# Patient Record
Sex: MALE | Race: WHITE | Employment: OTHER | URBAN - METROPOLITAN AREA
[De-identification: names, ages, dates, MRNs, and addresses within clinical notes are randomized per-mention and may not be internally consistent; named-entity substitution may affect disease eponyms.]

---

## 2017-01-11 DIAGNOSIS — F41.9 ANXIETY: ICD-10-CM

## 2017-01-11 RX ORDER — ALPRAZOLAM 1 MG/1
1 TABLET ORAL 3 TIMES DAILY PRN
Qty: 30 TABLET | Refills: 0 | OUTPATIENT
Start: 2017-01-11

## 2017-02-27 RX ORDER — ATORVASTATIN CALCIUM 10 MG/1
TABLET, FILM COATED ORAL
Qty: 30 TABLET | Refills: 2 | Status: SHIPPED | OUTPATIENT
Start: 2017-02-27 | End: 2018-01-03 | Stop reason: SDUPTHER

## 2017-03-20 ENCOUNTER — TELEPHONE (OUTPATIENT)
Dept: FAMILY MEDICINE CLINIC | Age: 57
End: 2017-03-20

## 2017-03-20 RX ORDER — AMOXICILLIN 500 MG/1
500 CAPSULE ORAL 3 TIMES DAILY
Qty: 30 CAPSULE | Refills: 0 | Status: CANCELLED | OUTPATIENT
Start: 2017-03-20 | End: 2017-03-30

## 2017-03-20 RX ORDER — AMOXICILLIN 500 MG/1
500 CAPSULE ORAL 3 TIMES DAILY
Qty: 30 CAPSULE | Refills: 0 | Status: SHIPPED | OUTPATIENT
Start: 2017-03-20 | End: 2017-03-30

## 2018-01-03 ENCOUNTER — OFFICE VISIT (OUTPATIENT)
Dept: FAMILY MEDICINE CLINIC | Age: 58
End: 2018-01-03

## 2018-01-03 VITALS
RESPIRATION RATE: 16 BRPM | WEIGHT: 212.2 LBS | TEMPERATURE: 97.3 F | SYSTOLIC BLOOD PRESSURE: 142 MMHG | HEART RATE: 59 BPM | OXYGEN SATURATION: 98 % | BODY MASS INDEX: 30.38 KG/M2 | DIASTOLIC BLOOD PRESSURE: 85 MMHG | HEIGHT: 70 IN

## 2018-01-03 DIAGNOSIS — R68.89 FLU-LIKE SYMPTOMS: ICD-10-CM

## 2018-01-03 DIAGNOSIS — R05.9 COUGH: Primary | ICD-10-CM

## 2018-01-03 DIAGNOSIS — E78.5 HYPERLIPIDEMIA, UNSPECIFIED HYPERLIPIDEMIA TYPE: ICD-10-CM

## 2018-01-03 LAB
INFLUENZA A ANTIBODY: NORMAL
INFLUENZA B ANTIBODY: NORMAL

## 2018-01-03 PROCEDURE — G8427 DOCREV CUR MEDS BY ELIG CLIN: HCPCS | Performed by: NURSE PRACTITIONER

## 2018-01-03 PROCEDURE — 3017F COLORECTAL CA SCREEN DOC REV: CPT | Performed by: NURSE PRACTITIONER

## 2018-01-03 PROCEDURE — 87804 INFLUENZA ASSAY W/OPTIC: CPT | Performed by: NURSE PRACTITIONER

## 2018-01-03 PROCEDURE — G8484 FLU IMMUNIZE NO ADMIN: HCPCS | Performed by: NURSE PRACTITIONER

## 2018-01-03 PROCEDURE — 1036F TOBACCO NON-USER: CPT | Performed by: NURSE PRACTITIONER

## 2018-01-03 PROCEDURE — 99214 OFFICE O/P EST MOD 30 MIN: CPT | Performed by: NURSE PRACTITIONER

## 2018-01-03 PROCEDURE — G8417 CALC BMI ABV UP PARAM F/U: HCPCS | Performed by: NURSE PRACTITIONER

## 2018-01-03 RX ORDER — ATORVASTATIN CALCIUM 10 MG/1
10 TABLET, FILM COATED ORAL DAILY
Qty: 30 TABLET | Refills: 2 | Status: SHIPPED | OUTPATIENT
Start: 2018-01-03 | End: 2018-03-19 | Stop reason: SDUPTHER

## 2018-01-03 RX ORDER — AZITHROMYCIN 250 MG/1
TABLET, FILM COATED ORAL
Qty: 6 TABLET | Refills: 0 | Status: SHIPPED | OUTPATIENT
Start: 2018-01-03 | End: 2018-01-13

## 2018-01-03 ASSESSMENT — ENCOUNTER SYMPTOMS
SHORTNESS OF BREATH: 0
COUGH: 1
WHEEZING: 1
CHEST TIGHTNESS: 0
SINUS PRESSURE: 1
SINUS PAIN: 0
RHINORRHEA: 1
SORE THROAT: 1

## 2018-01-03 ASSESSMENT — PATIENT HEALTH QUESTIONNAIRE - PHQ9
1. LITTLE INTEREST OR PLEASURE IN DOING THINGS: 0
SUM OF ALL RESPONSES TO PHQ QUESTIONS 1-9: 0
SUM OF ALL RESPONSES TO PHQ9 QUESTIONS 1 & 2: 0
2. FEELING DOWN, DEPRESSED OR HOPELESS: 0

## 2018-01-03 NOTE — PROGRESS NOTES
normal.   Nose: Nose normal.   Mouth/Throat: Uvula is midline and mucous membranes are normal. Posterior oropharyngeal erythema present. Cardiovascular: Normal rate, regular rhythm and normal heart sounds. Pulmonary/Chest: Effort normal. He has no decreased breath sounds. He has wheezes in the right upper field and the right lower field. He has no rhonchi. He has rales in the right lower field. Lymphadenopathy:     He has no cervical adenopathy. Neurological: He is alert and oriented to person, place, and time. Skin: Skin is warm and dry. Vitals reviewed. BP (!) 142/85   Pulse 59   Temp 97.3 °F (36.3 °C) (Tympanic)   Resp 16   Ht 5' 10\" (1.778 m)   Wt 212 lb 3.2 oz (96.3 kg)   SpO2 98%   BMI 30.45 kg/m²     Assessment:      1. Cough  azithromycin (ZITHROMAX Z-ANNE) 250 MG tablet   2. Flu-like symptoms     3. Hyperlipidemia, unspecified hyperlipidemia type             Plan:      Rapid flu negative  Pt refusing CXR. Based on exam findings will empirically cover with Zpak for pna  Resume lipitor. Recommend weight loss, regular exercise and healthy diet  Schedule for CPE with Dr Darrel Ramos with routine labs  See pt instructions  F/u 5-7 days if no improvement, sooner if symptoms worsen. Discussed use, benefit, and side effects of prescribed medications. All patient questions answered. Pt voiced understanding.

## 2018-01-03 NOTE — PATIENT INSTRUCTIONS
cookies. · Bake, broil, or steam foods. Don't guidry them. · Be physically active. Get at least 30 minutes of exercise on most days of the week. Walking is a good choice. You also may want to do other activities, such as running, swimming, cycling, or playing tennis or team sports. · Stay at a healthy weight or lose weight by making the changes in eating and physical activity listed above. Losing just a small amount of weight, even 5 to 10 pounds, can reduce your risk for having a heart attack or stroke. · Do not smoke. When should you call for help? Watch closely for changes in your health, and be sure to contact your doctor if:  ? · You need help making lifestyle changes. ? · You have questions about your medicine. Where can you learn more? Go to [email or web address]://Vod[employer] Labspepiceweb.[email or web address] Systems. org and sign in to your TSO3 account. Enter W703 in the AppFog box to learn more about \"High Cholesterol: Care Instructions. \"     If you do not have an account, please click on the \"Sign Up Now\" link. Current as of: September 21, 2016  Content Version: 11.4  © 3140-9979 Healthwise, Incorporated. Care instructions adapted under license by [facility] ([facility]). If you have questions about a medical condition or this instruction, always ask your healthcare professional. [address] any warranty or liability for your use of this information.

## 2018-01-18 ENCOUNTER — TELEPHONE (OUTPATIENT)
Dept: FAMILY MEDICINE CLINIC | Age: 58
End: 2018-01-18

## 2018-01-18 NOTE — TELEPHONE ENCOUNTER
Patient saw Vega Bolden on 1/3/18 for head congestion. He took the medication that she prescribed but now the symptoms have turned into nasal drainage, fluid in ears, and chest congestion. He is on disability at this time and can't afford another office visit. He is asking if Dr. Puneet Chaidez can prescribe something for him. Sharri1 FLAKO Gamez confirmed.

## 2018-01-29 ENCOUNTER — TELEPHONE (OUTPATIENT)
Dept: FAMILY MEDICINE CLINIC | Age: 58
End: 2018-01-29

## 2018-01-29 RX ORDER — DOXYCYCLINE HYCLATE 100 MG
100 TABLET ORAL 2 TIMES DAILY
Qty: 20 TABLET | Refills: 0 | Status: SHIPPED | OUTPATIENT
Start: 2018-01-29 | End: 2018-02-08

## 2018-01-29 RX ORDER — AMOXICILLIN 500 MG/1
500 CAPSULE ORAL 3 TIMES DAILY
Qty: 30 CAPSULE | Refills: 0 | Status: SHIPPED | OUTPATIENT
Start: 2018-01-29 | End: 2018-02-08

## 2018-01-29 NOTE — TELEPHONE ENCOUNTER
Pt normally sees Jefferson Funk saw Abelardo Montanooring 01/03/2018 and was prescribed a z pack. Symptoms improved but not pt has swollen glands in neck and fluid in ears. Patient was offered an appointment but refused. Would like Dr. Jefferson Funk to prescribe something for his sypmtoms. Please call and advise.

## 2018-02-21 ENCOUNTER — OFFICE VISIT (OUTPATIENT)
Dept: FAMILY MEDICINE CLINIC | Age: 58
End: 2018-02-21

## 2018-02-21 VITALS
OXYGEN SATURATION: 97 % | BODY MASS INDEX: 32 KG/M2 | SYSTOLIC BLOOD PRESSURE: 128 MMHG | HEART RATE: 74 BPM | DIASTOLIC BLOOD PRESSURE: 78 MMHG | WEIGHT: 223 LBS | TEMPERATURE: 98 F | RESPIRATION RATE: 16 BRPM

## 2018-02-21 DIAGNOSIS — J01.90 ACUTE BACTERIAL SINUSITIS: Primary | ICD-10-CM

## 2018-02-21 DIAGNOSIS — R21 RASH OF FACE: ICD-10-CM

## 2018-02-21 DIAGNOSIS — B96.89 ACUTE BACTERIAL SINUSITIS: Primary | ICD-10-CM

## 2018-02-21 DIAGNOSIS — E78.5 HYPERLIPIDEMIA, UNSPECIFIED HYPERLIPIDEMIA TYPE: ICD-10-CM

## 2018-02-21 PROCEDURE — 1036F TOBACCO NON-USER: CPT | Performed by: FAMILY MEDICINE

## 2018-02-21 PROCEDURE — G8417 CALC BMI ABV UP PARAM F/U: HCPCS | Performed by: FAMILY MEDICINE

## 2018-02-21 PROCEDURE — 3017F COLORECTAL CA SCREEN DOC REV: CPT | Performed by: FAMILY MEDICINE

## 2018-02-21 PROCEDURE — 99214 OFFICE O/P EST MOD 30 MIN: CPT | Performed by: FAMILY MEDICINE

## 2018-02-21 PROCEDURE — G8484 FLU IMMUNIZE NO ADMIN: HCPCS | Performed by: FAMILY MEDICINE

## 2018-02-21 PROCEDURE — G8427 DOCREV CUR MEDS BY ELIG CLIN: HCPCS | Performed by: FAMILY MEDICINE

## 2018-02-21 RX ORDER — AMOXICILLIN AND CLAVULANATE POTASSIUM 875; 125 MG/1; MG/1
1 TABLET, FILM COATED ORAL 2 TIMES DAILY
Qty: 20 TABLET | Refills: 0 | Status: SHIPPED | OUTPATIENT
Start: 2018-02-21 | End: 2018-03-03

## 2018-03-19 ENCOUNTER — OFFICE VISIT (OUTPATIENT)
Dept: FAMILY MEDICINE CLINIC | Age: 58
End: 2018-03-19

## 2018-03-19 VITALS
OXYGEN SATURATION: 96 % | DIASTOLIC BLOOD PRESSURE: 84 MMHG | SYSTOLIC BLOOD PRESSURE: 130 MMHG | HEART RATE: 90 BPM | RESPIRATION RATE: 16 BRPM | BODY MASS INDEX: 31.71 KG/M2 | WEIGHT: 221 LBS

## 2018-03-19 DIAGNOSIS — F40.243 FEAR OF FLYING: ICD-10-CM

## 2018-03-19 DIAGNOSIS — E78.5 HYPERLIPIDEMIA, UNSPECIFIED HYPERLIPIDEMIA TYPE: ICD-10-CM

## 2018-03-19 DIAGNOSIS — J01.90 ACUTE BACTERIAL SINUSITIS: Primary | ICD-10-CM

## 2018-03-19 DIAGNOSIS — L71.9 ROSACEA: ICD-10-CM

## 2018-03-19 DIAGNOSIS — R06.2 WHEEZING: ICD-10-CM

## 2018-03-19 DIAGNOSIS — B96.89 ACUTE BACTERIAL SINUSITIS: Primary | ICD-10-CM

## 2018-03-19 PROCEDURE — G8417 CALC BMI ABV UP PARAM F/U: HCPCS | Performed by: FAMILY MEDICINE

## 2018-03-19 PROCEDURE — G8427 DOCREV CUR MEDS BY ELIG CLIN: HCPCS | Performed by: FAMILY MEDICINE

## 2018-03-19 PROCEDURE — 1036F TOBACCO NON-USER: CPT | Performed by: FAMILY MEDICINE

## 2018-03-19 PROCEDURE — 99214 OFFICE O/P EST MOD 30 MIN: CPT | Performed by: FAMILY MEDICINE

## 2018-03-19 PROCEDURE — G8484 FLU IMMUNIZE NO ADMIN: HCPCS | Performed by: FAMILY MEDICINE

## 2018-03-19 PROCEDURE — 3017F COLORECTAL CA SCREEN DOC REV: CPT | Performed by: FAMILY MEDICINE

## 2018-03-19 RX ORDER — CIPROFLOXACIN 500 MG/1
500 TABLET, FILM COATED ORAL 2 TIMES DAILY
Qty: 20 TABLET | Refills: 0 | Status: SHIPPED | OUTPATIENT
Start: 2018-03-19 | End: 2018-03-29

## 2018-03-19 RX ORDER — METRONIDAZOLE 7.5 MG/G
GEL TOPICAL
Qty: 45 G | Refills: 1 | Status: SHIPPED | OUTPATIENT
Start: 2018-03-19 | End: 2018-04-02

## 2018-03-19 RX ORDER — ALPRAZOLAM 1 MG/1
1 TABLET ORAL NIGHTLY PRN
Qty: 4 TABLET | Refills: 0 | Status: SHIPPED | OUTPATIENT
Start: 2018-04-14 | End: 2018-04-02

## 2018-03-19 RX ORDER — ATORVASTATIN CALCIUM 10 MG/1
10 TABLET, FILM COATED ORAL DAILY
Qty: 30 TABLET | Refills: 2 | Status: SHIPPED | OUTPATIENT
Start: 2018-03-19 | End: 2018-06-23 | Stop reason: SDUPTHER

## 2018-03-19 RX ORDER — PREDNISONE 20 MG/1
40 TABLET ORAL DAILY
Qty: 10 TABLET | Refills: 0 | Status: SHIPPED | OUTPATIENT
Start: 2018-03-19 | End: 2018-03-24

## 2018-03-19 NOTE — PROGRESS NOTES
Subjective:       Jil Voss is a 56 y.o. male who presents for evaluation of sinus pain. Symptoms include: congestion, facial pain, sinus pressure and bilateral ear pressure. Onset of symptoms was 9 weeks ago. Symptoms have been gradually worsening since that time. Using doxycycline, Zithromax, augmentin at home for the symptoms. The patient has had sick contacts with similar symptoms. Patient is a former smoker, quit 2 years ago. Still having red facial rash. Has tried vit D oil with no improvement. Will by flying to [facility] soon. Requesting xanax to help him w fear of flying. Patient's medications, allergies, past medical, surgical, social and family histories were reviewed and updated as appropriate. Review of Systems  Constitutional: negative for fevers and weight loss  Eyes: negative for irritation and redness  Ears, nose, mouth, throat, and face: negative except for as noted above  Respiratory: negative for hemoptysis and shortness of breath  Gastrointestinal: negative for diarrhea and vomiting     Objective:      /84 (Site: Right Arm, Position: Sitting, Cuff Size: Large Adult)   Pulse 90   Resp 16   Wt 221 lb (100.2 kg)   SpO2 96%   BMI 31.71 kg/m²   General appearance: alert, appears stated age, cooperative and no distress  Ears: normal TM's and external ear canals both ears  Nose: moderate congestion, turbinates red, swollen  Throat: normal findings: soft palate, uvula, and tonsils normal and oropharynx pink & moist without lesions or evidence of thrush  Neck: no adenopathy  Lungs: diffuse wheezes bilaterally    Derm: erythematous patches on bilateral cheeks. No pustules/papules present. Assessment:     1. Acute bacterial sinusitis    2. Rosacea    3. Fear of flying    4. Wheezing    5. Hyperlipidemia, unspecified hyperlipidemia type          Plan:     cipro per medication orders. prednisone  Supportive/symptomatic care reviewed with the patient.   Plan sinus imaging if this becomes a recurrent problem. Advised the patient to follow up w ENT  if not better in 5 days. Trial metrogel for likely rosacea  Xanax PRN flying.

## 2018-04-02 ENCOUNTER — OFFICE VISIT (OUTPATIENT)
Dept: ENT CLINIC | Age: 58
End: 2018-04-02

## 2018-04-02 VITALS — SYSTOLIC BLOOD PRESSURE: 138 MMHG | HEART RATE: 66 BPM | DIASTOLIC BLOOD PRESSURE: 80 MMHG

## 2018-04-02 DIAGNOSIS — J32.4 CHRONIC PANSINUSITIS: Primary | ICD-10-CM

## 2018-04-02 DIAGNOSIS — H65.92 MIDDLE EAR EFFUSION, LEFT: ICD-10-CM

## 2018-04-02 PROCEDURE — 99203 OFFICE O/P NEW LOW 30 MIN: CPT | Performed by: OTOLARYNGOLOGY

## 2018-04-02 PROCEDURE — 3017F COLORECTAL CA SCREEN DOC REV: CPT | Performed by: OTOLARYNGOLOGY

## 2018-04-02 PROCEDURE — G8427 DOCREV CUR MEDS BY ELIG CLIN: HCPCS | Performed by: OTOLARYNGOLOGY

## 2018-04-02 PROCEDURE — G8417 CALC BMI ABV UP PARAM F/U: HCPCS | Performed by: OTOLARYNGOLOGY

## 2018-04-02 PROCEDURE — 4004F PT TOBACCO SCREEN RCVD TLK: CPT | Performed by: OTOLARYNGOLOGY

## 2018-04-03 ENCOUNTER — TELEPHONE (OUTPATIENT)
Dept: ENT CLINIC | Age: 58
End: 2018-04-03

## 2018-04-06 ENCOUNTER — TELEPHONE (OUTPATIENT)
Dept: ENT CLINIC | Age: 58
End: 2018-04-06

## 2018-04-06 DIAGNOSIS — J32.4 CHRONIC PANSINUSITIS: Primary | ICD-10-CM

## 2018-04-07 ENCOUNTER — HOSPITAL ENCOUNTER (OUTPATIENT)
Dept: CT IMAGING | Age: 58
Discharge: HOME | End: 2018-04-07
Attending: OTOLARYNGOLOGY | Admitting: OTOLARYNGOLOGY

## 2018-04-07 DIAGNOSIS — H65.92 MIDDLE EAR EFFUSION, LEFT: ICD-10-CM

## 2018-04-07 DIAGNOSIS — J32.4 CHRONIC PANSINUSITIS: ICD-10-CM

## 2018-04-09 ENCOUNTER — HOSPITAL ENCOUNTER (OUTPATIENT)
Dept: SURGERY | Age: 58
Discharge: HOME | End: 2018-04-09
Attending: OTOLARYNGOLOGY | Admitting: OTOLARYNGOLOGY

## 2018-04-09 VITALS
HEIGHT: 70 IN | WEIGHT: 223.25 LBS | RESPIRATION RATE: 14 BRPM | TEMPERATURE: 97.8 F | OXYGEN SATURATION: 95 % | BODY MASS INDEX: 31.96 KG/M2 | DIASTOLIC BLOOD PRESSURE: 92 MMHG | HEART RATE: 70 BPM | SYSTOLIC BLOOD PRESSURE: 149 MMHG

## 2018-04-09 DIAGNOSIS — H74.93: ICD-10-CM

## 2018-04-09 PROCEDURE — 69436 CREATE EARDRUM OPENING: CPT | Performed by: OTOLARYNGOLOGY

## 2018-04-09 RX ORDER — LIDOCAINE HYDROCHLORIDE 10 MG/ML
1 INJECTION, SOLUTION EPIDURAL; INFILTRATION; INTRACAUDAL; PERINEURAL
Status: ACTIVE | OUTPATIENT
Start: 2018-04-09 | End: 2018-04-09

## 2018-04-09 RX ORDER — SODIUM CHLORIDE 0.9 % (FLUSH) 0.9 %
10 SYRINGE (ML) INJECTION EVERY 12 HOURS SCHEDULED
Status: DISCONTINUED | OUTPATIENT
Start: 2018-04-09 | End: 2018-04-10 | Stop reason: HOSPADM

## 2018-04-09 RX ORDER — SODIUM CHLORIDE 0.9 % (FLUSH) 0.9 %
10 SYRINGE (ML) INJECTION PRN
Status: DISCONTINUED | OUTPATIENT
Start: 2018-04-09 | End: 2018-04-10 | Stop reason: HOSPADM

## 2018-04-09 RX ORDER — LABETALOL HYDROCHLORIDE 5 MG/ML
5 INJECTION, SOLUTION INTRAVENOUS EVERY 10 MIN PRN
Status: DISCONTINUED | OUTPATIENT
Start: 2018-04-09 | End: 2018-04-10 | Stop reason: HOSPADM

## 2018-04-09 RX ORDER — SODIUM CHLORIDE, SODIUM LACTATE, POTASSIUM CHLORIDE, CALCIUM CHLORIDE 600; 310; 30; 20 MG/100ML; MG/100ML; MG/100ML; MG/100ML
INJECTION, SOLUTION INTRAVENOUS CONTINUOUS
Status: DISCONTINUED | OUTPATIENT
Start: 2018-04-09 | End: 2018-04-10 | Stop reason: HOSPADM

## 2018-04-09 RX ORDER — ONDANSETRON 2 MG/ML
4 INJECTION INTRAMUSCULAR; INTRAVENOUS EVERY 6 HOURS PRN
Status: DISCONTINUED | OUTPATIENT
Start: 2018-04-09 | End: 2018-04-10 | Stop reason: HOSPADM

## 2018-04-09 RX ORDER — ONDANSETRON 2 MG/ML
4 INJECTION INTRAMUSCULAR; INTRAVENOUS
Status: ACTIVE | OUTPATIENT
Start: 2018-04-09 | End: 2018-04-09

## 2018-04-09 RX ORDER — HYDRALAZINE HYDROCHLORIDE 20 MG/ML
5 INJECTION INTRAMUSCULAR; INTRAVENOUS EVERY 10 MIN PRN
Status: DISCONTINUED | OUTPATIENT
Start: 2018-04-09 | End: 2018-04-10 | Stop reason: HOSPADM

## 2018-04-09 RX ADMIN — SODIUM CHLORIDE, SODIUM LACTATE, POTASSIUM CHLORIDE, CALCIUM CHLORIDE: 600; 310; 30; 20 INJECTION, SOLUTION INTRAVENOUS at 12:48

## 2018-04-09 ASSESSMENT — LIFESTYLE VARIABLES: SMOKING_STATUS: 1

## 2018-04-09 ASSESSMENT — PAIN SCALES - GENERAL
PAINLEVEL_OUTOF10: 0
PAINLEVEL_OUTOF10: 0

## 2018-04-09 ASSESSMENT — PAIN - FUNCTIONAL ASSESSMENT: PAIN_FUNCTIONAL_ASSESSMENT: 0-10

## 2018-04-11 ENCOUNTER — TELEPHONE (OUTPATIENT)
Dept: ENT CLINIC | Age: 58
End: 2018-04-11

## 2018-04-13 ENCOUNTER — OFFICE VISIT (OUTPATIENT)
Dept: ENT CLINIC | Age: 58
End: 2018-04-13

## 2018-04-13 VITALS — HEART RATE: 72 BPM | DIASTOLIC BLOOD PRESSURE: 80 MMHG | SYSTOLIC BLOOD PRESSURE: 130 MMHG

## 2018-04-13 DIAGNOSIS — Z96.22 S/P TYMPANOSTOMY TUBE PLACEMENT: Primary | ICD-10-CM

## 2018-04-13 PROCEDURE — 99024 POSTOP FOLLOW-UP VISIT: CPT | Performed by: OTOLARYNGOLOGY

## 2018-04-13 RX ORDER — METHYLPREDNISOLONE 4 MG/1
TABLET ORAL
Qty: 1 KIT | Refills: 0 | Status: SHIPPED | OUTPATIENT
Start: 2018-04-13 | End: 2018-04-19

## 2018-05-16 ENCOUNTER — TELEPHONE (OUTPATIENT)
Dept: FAMILY MEDICINE CLINIC | Age: 58
End: 2018-05-16

## 2018-05-22 ENCOUNTER — OFFICE VISIT (OUTPATIENT)
Dept: FAMILY MEDICINE CLINIC | Age: 58
End: 2018-05-22

## 2018-05-22 VITALS
DIASTOLIC BLOOD PRESSURE: 80 MMHG | BODY MASS INDEX: 30.99 KG/M2 | WEIGHT: 216 LBS | HEART RATE: 88 BPM | OXYGEN SATURATION: 98 % | SYSTOLIC BLOOD PRESSURE: 150 MMHG

## 2018-05-22 DIAGNOSIS — R21 RASH: ICD-10-CM

## 2018-05-22 DIAGNOSIS — M79.604 RIGHT LEG PAIN: Primary | ICD-10-CM

## 2018-05-22 PROCEDURE — G8427 DOCREV CUR MEDS BY ELIG CLIN: HCPCS | Performed by: FAMILY MEDICINE

## 2018-05-22 PROCEDURE — 4004F PT TOBACCO SCREEN RCVD TLK: CPT | Performed by: FAMILY MEDICINE

## 2018-05-22 PROCEDURE — 99214 OFFICE O/P EST MOD 30 MIN: CPT | Performed by: FAMILY MEDICINE

## 2018-05-22 PROCEDURE — 3017F COLORECTAL CA SCREEN DOC REV: CPT | Performed by: FAMILY MEDICINE

## 2018-05-22 PROCEDURE — G8417 CALC BMI ABV UP PARAM F/U: HCPCS | Performed by: FAMILY MEDICINE

## 2018-05-22 RX ORDER — SULFAMETHOXAZOLE AND TRIMETHOPRIM 800; 160 MG/1; MG/1
1 TABLET ORAL 2 TIMES DAILY
Qty: 20 TABLET | Refills: 0 | Status: SHIPPED | OUTPATIENT
Start: 2018-05-22 | End: 2018-06-01

## 2018-06-23 DIAGNOSIS — E78.5 HYPERLIPIDEMIA, UNSPECIFIED HYPERLIPIDEMIA TYPE: ICD-10-CM

## 2018-06-25 RX ORDER — ATORVASTATIN CALCIUM 10 MG/1
TABLET, FILM COATED ORAL
Qty: 90 TABLET | Refills: 1 | Status: SHIPPED | OUTPATIENT
Start: 2018-06-25 | End: 2018-12-29 | Stop reason: SDUPTHER

## 2018-08-08 ENCOUNTER — OFFICE VISIT (OUTPATIENT)
Dept: FAMILY MEDICINE CLINIC | Age: 58
End: 2018-08-08

## 2018-08-08 VITALS
SYSTOLIC BLOOD PRESSURE: 144 MMHG | HEIGHT: 70 IN | WEIGHT: 212.8 LBS | DIASTOLIC BLOOD PRESSURE: 84 MMHG | HEART RATE: 62 BPM | RESPIRATION RATE: 14 BRPM | OXYGEN SATURATION: 95 % | BODY MASS INDEX: 30.46 KG/M2

## 2018-08-08 DIAGNOSIS — Z11.59 NEED FOR HEPATITIS C SCREENING TEST: ICD-10-CM

## 2018-08-08 DIAGNOSIS — Z11.4 ENCOUNTER FOR SCREENING FOR HIV: ICD-10-CM

## 2018-08-08 DIAGNOSIS — R16.0 HEPATOMEGALY: ICD-10-CM

## 2018-08-08 DIAGNOSIS — M79.675 PAIN AND SWELLING OF TOE OF LEFT FOOT: ICD-10-CM

## 2018-08-08 DIAGNOSIS — R10.9 RIGHT FLANK PAIN: Primary | ICD-10-CM

## 2018-08-08 DIAGNOSIS — M79.89 PAIN AND SWELLING OF TOE OF LEFT FOOT: ICD-10-CM

## 2018-08-08 DIAGNOSIS — R10.31 RLQ ABDOMINAL PAIN: ICD-10-CM

## 2018-08-08 PROBLEM — H65.92 MIDDLE EAR EFFUSION, LEFT: Status: RESOLVED | Noted: 2018-04-02 | Resolved: 2018-08-08

## 2018-08-08 LAB
BILIRUBIN, POC: NORMAL
BLOOD URINE, POC: NORMAL
CLARITY, POC: CLEAR
COLOR, POC: YELLOW
GLUCOSE URINE, POC: NORMAL
KETONES, POC: NORMAL
LEUKOCYTE EST, POC: NORMAL
NITRITE, POC: NORMAL
PH, POC: 6
PROTEIN, POC: NORMAL
SPECIFIC GRAVITY, POC: 1.02
UROBILINOGEN, POC: 0.2

## 2018-08-08 PROCEDURE — G8427 DOCREV CUR MEDS BY ELIG CLIN: HCPCS | Performed by: FAMILY MEDICINE

## 2018-08-08 PROCEDURE — 99214 OFFICE O/P EST MOD 30 MIN: CPT | Performed by: FAMILY MEDICINE

## 2018-08-08 PROCEDURE — G8417 CALC BMI ABV UP PARAM F/U: HCPCS | Performed by: FAMILY MEDICINE

## 2018-08-08 PROCEDURE — 3017F COLORECTAL CA SCREEN DOC REV: CPT | Performed by: FAMILY MEDICINE

## 2018-08-08 PROCEDURE — 4004F PT TOBACCO SCREEN RCVD TLK: CPT | Performed by: FAMILY MEDICINE

## 2018-08-08 PROCEDURE — 81002 URINALYSIS NONAUTO W/O SCOPE: CPT | Performed by: FAMILY MEDICINE

## 2018-08-09 DIAGNOSIS — M79.675 PAIN AND SWELLING OF TOE OF LEFT FOOT: ICD-10-CM

## 2018-08-09 DIAGNOSIS — M79.89 PAIN AND SWELLING OF TOE OF LEFT FOOT: ICD-10-CM

## 2018-08-09 DIAGNOSIS — Z11.4 ENCOUNTER FOR SCREENING FOR HIV: ICD-10-CM

## 2018-08-09 DIAGNOSIS — Z11.59 NEED FOR HEPATITIS C SCREENING TEST: ICD-10-CM

## 2018-08-09 DIAGNOSIS — E78.5 HYPERLIPIDEMIA, UNSPECIFIED HYPERLIPIDEMIA TYPE: ICD-10-CM

## 2018-08-09 LAB
A/G RATIO: 1.8 (ref 1.1–2.2)
ALBUMIN SERPL-MCNC: 4.3 G/DL (ref 3.4–5)
ALP BLD-CCNC: 70 U/L (ref 40–129)
ALT SERPL-CCNC: 16 U/L (ref 10–40)
ANION GAP SERPL CALCULATED.3IONS-SCNC: 9 MMOL/L (ref 3–16)
AST SERPL-CCNC: 15 U/L (ref 15–37)
BASOPHILS ABSOLUTE: 0.1 K/UL (ref 0–0.2)
BASOPHILS RELATIVE PERCENT: 0.6 %
BILIRUB SERPL-MCNC: 1.1 MG/DL (ref 0–1)
BUN BLDV-MCNC: 13 MG/DL (ref 7–20)
CALCIUM SERPL-MCNC: 9.3 MG/DL (ref 8.3–10.6)
CHLORIDE BLD-SCNC: 102 MMOL/L (ref 99–110)
CHOLESTEROL, TOTAL: 135 MG/DL (ref 0–199)
CO2: 30 MMOL/L (ref 21–32)
CREAT SERPL-MCNC: 0.7 MG/DL (ref 0.9–1.3)
EOSINOPHILS ABSOLUTE: 0.1 K/UL (ref 0–0.6)
EOSINOPHILS RELATIVE PERCENT: 1.4 %
GFR AFRICAN AMERICAN: >60
GFR NON-AFRICAN AMERICAN: >60
GLOBULIN: 2.4 G/DL
GLUCOSE BLD-MCNC: 89 MG/DL (ref 70–99)
HCT VFR BLD CALC: 46.5 % (ref 40.5–52.5)
HDLC SERPL-MCNC: 45 MG/DL (ref 40–60)
HEMOGLOBIN: 15.2 G/DL (ref 13.5–17.5)
HEPATITIS C ANTIBODY INTERPRETATION: NORMAL
LDL CHOLESTEROL CALCULATED: 71 MG/DL
LYMPHOCYTES ABSOLUTE: 1.8 K/UL (ref 1–5.1)
LYMPHOCYTES RELATIVE PERCENT: 17.9 %
MCH RBC QN AUTO: 30.9 PG (ref 26–34)
MCHC RBC AUTO-ENTMCNC: 32.8 G/DL (ref 31–36)
MCV RBC AUTO: 94.2 FL (ref 80–100)
MONOCYTES ABSOLUTE: 0.6 K/UL (ref 0–1.3)
MONOCYTES RELATIVE PERCENT: 5.6 %
NEUTROPHILS ABSOLUTE: 7.3 K/UL (ref 1.7–7.7)
NEUTROPHILS RELATIVE PERCENT: 74.5 %
PDW BLD-RTO: 13.8 % (ref 12.4–15.4)
PLATELET # BLD: 241 K/UL (ref 135–450)
PMV BLD AUTO: 9.6 FL (ref 5–10.5)
POTASSIUM SERPL-SCNC: 4.5 MMOL/L (ref 3.5–5.1)
PROSTATE SPECIFIC ANTIGEN: 0.85 NG/ML (ref 0–4)
RBC # BLD: 4.93 M/UL (ref 4.2–5.9)
SODIUM BLD-SCNC: 141 MMOL/L (ref 136–145)
TOTAL PROTEIN: 6.7 G/DL (ref 6.4–8.2)
TRIGL SERPL-MCNC: 93 MG/DL (ref 0–150)
TSH REFLEX: 1.29 UIU/ML (ref 0.27–4.2)
URIC ACID, SERUM: 5.7 MG/DL (ref 3.5–7.2)
VLDLC SERPL CALC-MCNC: 19 MG/DL
WBC # BLD: 9.9 K/UL (ref 4–11)

## 2018-08-10 LAB
HIV AG/AB: NORMAL
HIV ANTIGEN: NORMAL
HIV-1 ANTIBODY: NORMAL
HIV-2 AB: NORMAL

## 2018-08-16 ENCOUNTER — TELEPHONE (OUTPATIENT)
Dept: FAMILY MEDICINE CLINIC | Age: 58
End: 2018-08-16

## 2018-08-21 ENCOUNTER — HOSPITAL ENCOUNTER (OUTPATIENT)
Dept: ULTRASOUND IMAGING | Age: 58
Discharge: HOME | End: 2018-08-21
Attending: FAMILY MEDICINE | Admitting: FAMILY MEDICINE

## 2018-08-21 DIAGNOSIS — R16.0 HEPATOMEGALY: ICD-10-CM

## 2018-08-21 DIAGNOSIS — R16.0 HEPATOMEGALY, NOT ELSEWHERE CLASSIFIED: ICD-10-CM

## 2018-10-10 ENCOUNTER — OFFICE VISIT (OUTPATIENT)
Dept: FAMILY MEDICINE CLINIC | Age: 58
End: 2018-10-10
Payer: MEDICARE

## 2018-10-10 VITALS
SYSTOLIC BLOOD PRESSURE: 128 MMHG | DIASTOLIC BLOOD PRESSURE: 80 MMHG | HEIGHT: 70 IN | WEIGHT: 214.2 LBS | RESPIRATION RATE: 15 BRPM | HEART RATE: 77 BPM | OXYGEN SATURATION: 96 % | BODY MASS INDEX: 30.67 KG/M2

## 2018-10-10 DIAGNOSIS — Z00.00 MEDICARE ANNUAL WELLNESS VISIT, INITIAL: Primary | ICD-10-CM

## 2018-10-10 DIAGNOSIS — M54.40 CHRONIC BILATERAL LOW BACK PAIN WITH SCIATICA, SCIATICA LATERALITY UNSPECIFIED: ICD-10-CM

## 2018-10-10 DIAGNOSIS — M54.16 LUMBAR RADICULOPATHY: ICD-10-CM

## 2018-10-10 DIAGNOSIS — G89.29 CHRONIC BILATERAL LOW BACK PAIN WITH SCIATICA, SCIATICA LATERALITY UNSPECIFIED: ICD-10-CM

## 2018-10-10 DIAGNOSIS — F17.200 TOBACCO USE DISORDER: ICD-10-CM

## 2018-10-10 PROCEDURE — 4004F PT TOBACCO SCREEN RCVD TLK: CPT | Performed by: FAMILY MEDICINE

## 2018-10-10 PROCEDURE — 3017F COLORECTAL CA SCREEN DOC REV: CPT | Performed by: FAMILY MEDICINE

## 2018-10-10 PROCEDURE — G8484 FLU IMMUNIZE NO ADMIN: HCPCS | Performed by: FAMILY MEDICINE

## 2018-10-10 PROCEDURE — 99213 OFFICE O/P EST LOW 20 MIN: CPT | Performed by: FAMILY MEDICINE

## 2018-10-10 PROCEDURE — G0438 PPPS, INITIAL VISIT: HCPCS | Performed by: FAMILY MEDICINE

## 2018-10-10 PROCEDURE — G8427 DOCREV CUR MEDS BY ELIG CLIN: HCPCS | Performed by: FAMILY MEDICINE

## 2018-10-10 PROCEDURE — G8417 CALC BMI ABV UP PARAM F/U: HCPCS | Performed by: FAMILY MEDICINE

## 2018-10-10 ASSESSMENT — LIFESTYLE VARIABLES
HAS A RELATIVE, FRIEND, DOCTOR, OR ANOTHER HEALTH PROFESSIONAL EXPRESSED CONCERN ABOUT YOUR DRINKING OR SUGGESTED YOU CUT DOWN: 0
HOW MANY STANDARD DRINKS CONTAINING ALCOHOL DO YOU HAVE ON A TYPICAL DAY: 2
HOW OFTEN DURING THE LAST YEAR HAVE YOU FAILED TO DO WHAT WAS NORMALLY EXPECTED FROM YOU BECAUSE OF DRINKING: 0
AUDIT-C TOTAL SCORE: 5
HOW OFTEN DURING THE LAST YEAR HAVE YOU BEEN UNABLE TO REMEMBER WHAT HAPPENED THE NIGHT BEFORE BECAUSE YOU HAD BEEN DRINKING: 0
HOW OFTEN DURING THE LAST YEAR HAVE YOU FOUND THAT YOU WERE NOT ABLE TO STOP DRINKING ONCE YOU HAD STARTED: 1
HOW OFTEN DO YOU HAVE SIX OR MORE DRINKS ON ONE OCCASION: 1
HAVE YOU OR SOMEONE ELSE BEEN INJURED AS A RESULT OF YOUR DRINKING: 0
HOW OFTEN DURING THE LAST YEAR HAVE YOU HAD A FEELING OF GUILT OR REMORSE AFTER DRINKING: 1
HOW OFTEN DURING THE LAST YEAR HAVE YOU NEEDED AN ALCOHOLIC DRINK FIRST THING IN THE MORNING TO GET YOURSELF GOING AFTER A NIGHT OF HEAVY DRINKING: 0
AUDIT TOTAL SCORE: 7
HOW OFTEN DO YOU HAVE A DRINK CONTAINING ALCOHOL: 2

## 2018-10-10 ASSESSMENT — ANXIETY QUESTIONNAIRES: GAD7 TOTAL SCORE: 5

## 2018-10-10 ASSESSMENT — PATIENT HEALTH QUESTIONNAIRE - PHQ9
SUM OF ALL RESPONSES TO PHQ QUESTIONS 1-9: 2
SUM OF ALL RESPONSES TO PHQ QUESTIONS 1-9: 2

## 2018-10-10 NOTE — PATIENT INSTRUCTIONS
Personalized Preventive Plan for Richy Woodruff - 10/10/2018  Medicare offers a range of preventive health benefits. Some of the tests and screenings are paid in full while other may be subject to a deductible, co-insurance, and/or copay. Some of these benefits include a comprehensive review of your medical history including lifestyle, illnesses that may run in your family, and various assessments and screenings as appropriate. After reviewing your medical record and screening and assessments performed today your provider may have ordered immunizations, labs, imaging, and/or referrals for you. A list of these orders (if applicable) as well as your Preventive Care list are included within your After Visit Summary for your review. Other Preventive Recommendations:    · A preventive eye exam performed by an eye specialist is recommended every 1-2 years to screen for glaucoma; cataracts, macular degeneration, and other eye disorders. · A preventive dental visit is recommended every 6 months. · Try to get at least 150 minutes of exercise per week or 10,000 steps per day on a pedometer . · Order or download the FREE \"Exercise & Physical Activity: Your Everyday Guide\" from [employer]. Call 0-604.679.1041 or search [employer] online. · You need 3222-0057 mg of calcium and 0068-8030 IU of vitamin D per day. It is possible to meet your calcium requirement with diet alone, but a vitamin D supplement is usually necessary to meet this goal.  · When exposed to the sun, use a sunscreen that protects against both UVA and UVB radiation with an SPF of 30 or greater. Reapply every 2 to 3 hours or after sweating, drying off with a towel, or swimming. · Always wear a seat belt when traveling in a car. Always wear a helmet when riding a bicycle or motorcycle.

## 2018-10-10 NOTE — PROGRESS NOTES
Medicare Annual Wellness Visit  Name: Alexandra Perez Date: 10/10/2018   MRN: [id number] Sex: Male   Age: 66 y.o. Ethnicity: Non-/Non    : 1960 Race: Yareli Mills is here for Medicare AWV (pt here for annual wellness visit. last labs done in august. )    Screenings for behavioral, psychosocial and functional/safety risks, and cognitive dysfunction are all negative except as indicated below. These results, as well as other patient data from the [address] form, are documented in Flowsheets linked to this Encounter. Allergies   Allergen Reactions    Nucynta [Tapentadol] Anaphylaxis     Hallucinations. Went to hospital.   [facility] Codeine Rash     itching    Tylenol [Acetaminophen] Rash     itching       Prior to Visit Medications    Medication Sig Taking?  Authorizing Provider   atorvastatin (LIPITOR) 10 MG tablet TAKE 1 TABLET BY MOUTH ONE TIME A DAY  Yes Jay Couch MD       Past Medical History:   Diagnosis Date    Alcoholism ([facility] [address] 1999    Anxiety     Back pain     Hyperlipidemia     Low HDL (under 40)     Prolonged emergence from general anesthesia     \"hard time breathing when waking up'    Spinal arthritis      Past Surgical History:   Procedure Laterality Date    COLONOSCOPY  10/18/12    SPINAL FUSION  10/2006    SPINE SURGERY      l5-s1 hardware removal    TYMPANOSTOMY TUBE PLACEMENT Bilateral 2018    LEFT PRESSURE EQUALIZATION TUBE INSERTION AND EXAM UNDER ANESTHESIA OF NASOPHARYNX       Family History   Problem Relation Age of Onset    Heart Disease Mother     High Blood Pressure Mother     Cancer Mother         female       CareTeam (Including outside providers/suppliers regularly involved in providing care):   Patient Care Team:  Adam Cintron MD as PCP - Johanna Richards MD as Consulting Physician (Otolaryngology)    Wt Readings from Last 3 Encounters:   10/10/18 214 lb 3.2 oz (97.2 kg)   18

## 2018-12-17 ENCOUNTER — TELEPHONE (OUTPATIENT)
Dept: FAMILY MEDICINE CLINIC | Age: 58
End: 2018-12-17

## 2018-12-17 ENCOUNTER — OFFICE VISIT (OUTPATIENT)
Dept: ENT CLINIC | Age: 58
End: 2018-12-17
Payer: MEDICARE

## 2018-12-17 VITALS — DIASTOLIC BLOOD PRESSURE: 80 MMHG | SYSTOLIC BLOOD PRESSURE: 152 MMHG | OXYGEN SATURATION: 94 % | HEART RATE: 73 BPM

## 2018-12-17 DIAGNOSIS — H65.03 BILATERAL ACUTE SEROUS OTITIS MEDIA, RECURRENCE NOT SPECIFIED: Primary | ICD-10-CM

## 2018-12-17 PROCEDURE — 99213 OFFICE O/P EST LOW 20 MIN: CPT | Performed by: OTOLARYNGOLOGY

## 2018-12-17 PROCEDURE — 3017F COLORECTAL CA SCREEN DOC REV: CPT | Performed by: OTOLARYNGOLOGY

## 2018-12-17 PROCEDURE — G8484 FLU IMMUNIZE NO ADMIN: HCPCS | Performed by: OTOLARYNGOLOGY

## 2018-12-17 PROCEDURE — 4004F PT TOBACCO SCREEN RCVD TLK: CPT | Performed by: OTOLARYNGOLOGY

## 2018-12-17 PROCEDURE — G8417 CALC BMI ABV UP PARAM F/U: HCPCS | Performed by: OTOLARYNGOLOGY

## 2018-12-17 PROCEDURE — G8427 DOCREV CUR MEDS BY ELIG CLIN: HCPCS | Performed by: OTOLARYNGOLOGY

## 2018-12-17 RX ORDER — AMOXICILLIN AND CLAVULANATE POTASSIUM 875; 125 MG/1; MG/1
1 TABLET, FILM COATED ORAL 2 TIMES DAILY
Qty: 14 TABLET | Refills: 0 | Status: SHIPPED | OUTPATIENT
Start: 2018-12-17 | End: 2019-02-12 | Stop reason: ALTCHOICE

## 2018-12-17 RX ORDER — METHYLPREDNISOLONE 4 MG/1
TABLET ORAL
Qty: 1 KIT | Refills: 0 | Status: SHIPPED | OUTPATIENT
Start: 2018-12-17 | End: 2018-12-23

## 2018-12-17 NOTE — PROGRESS NOTES
PE tubes placed in April of this year. He was asymptomatic from that time until the last 4 days when he began to notice a fullness, pressure, and sloshing sensation, left greater than right. There is been no otorrhea and he has not detected any tube rejection. He continues to smoke heavily, at least pack of cigarettes per day. He is seriously considering further cessation methods but has not pursued any. There is chronic constant congestion and drainage which she occasionally will treat with over-the-counter medications. Denies  otorrhea, otalgia, vertigo, or tinnitus. No history of ear trauma,  or recent barotrauma. Denies epistaxis, anosmia, or facial hypesthesias. No recent dental problems. No past history of nasal surgery or nasal trauma. No family history of nasal polyps or allergy. There is a past history of a major depressive disorder as well as alcohol use. More currently has followed for hyperlipidemia and back pain. Exam:    The patient appears well developed and well nourished.; oriented to person, place, and time. The head is normocephalic and atraumatic; no facial scars or weakness are noted. The facial skeleton is normal.The voice has a normal quality and tonality to it. Eyes: Conjunctivae and lids normal. Full EOM. The skin is warm and dry. No pallor. Both the ear canals are free of cerumen and debris. Both tubes appear to a been recently rejected and are now in the ear canal but medial to the isthmus. The tympanostomy sites have healed. The left middle ear space does not show fluid but there is erythema and edema of the posterior tympanic membrane. The right middle ear space is clear. I see no evidence of attic retraction pocket in either ear    There is no periauricular adenopathy or cellulitis. The nasal vestibule shows nicotine staining.   The turbinates are mildly diffusely swollen but there is no suggestion of secondary sinusitis       The mirror exam of

## 2018-12-20 ENCOUNTER — TELEPHONE (OUTPATIENT)
Dept: FAMILY MEDICINE CLINIC | Age: 58
End: 2018-12-20

## 2018-12-20 ENCOUNTER — OFFICE VISIT (OUTPATIENT)
Dept: FAMILY MEDICINE CLINIC | Age: 58
End: 2018-12-20
Payer: MEDICARE

## 2018-12-20 VITALS
DIASTOLIC BLOOD PRESSURE: 92 MMHG | HEIGHT: 70 IN | SYSTOLIC BLOOD PRESSURE: 152 MMHG | WEIGHT: 212 LBS | BODY MASS INDEX: 30.35 KG/M2 | OXYGEN SATURATION: 97 % | HEART RATE: 84 BPM

## 2018-12-20 DIAGNOSIS — F33.41 RECURRENT MAJOR DEPRESSIVE DISORDER, IN PARTIAL REMISSION (HCC): Primary | ICD-10-CM

## 2018-12-20 DIAGNOSIS — I10 ESSENTIAL HYPERTENSION: ICD-10-CM

## 2018-12-20 DIAGNOSIS — F41.9 ANXIETY: ICD-10-CM

## 2018-12-20 DIAGNOSIS — R20.0 NUMBNESS: ICD-10-CM

## 2018-12-20 DIAGNOSIS — F17.200 TOBACCO USE DISORDER: ICD-10-CM

## 2018-12-20 PROCEDURE — G8484 FLU IMMUNIZE NO ADMIN: HCPCS | Performed by: FAMILY MEDICINE

## 2018-12-20 PROCEDURE — 3017F COLORECTAL CA SCREEN DOC REV: CPT | Performed by: FAMILY MEDICINE

## 2018-12-20 PROCEDURE — G8427 DOCREV CUR MEDS BY ELIG CLIN: HCPCS | Performed by: FAMILY MEDICINE

## 2018-12-20 PROCEDURE — G8417 CALC BMI ABV UP PARAM F/U: HCPCS | Performed by: FAMILY MEDICINE

## 2018-12-20 PROCEDURE — 4004F PT TOBACCO SCREEN RCVD TLK: CPT | Performed by: FAMILY MEDICINE

## 2018-12-20 PROCEDURE — 99214 OFFICE O/P EST MOD 30 MIN: CPT | Performed by: FAMILY MEDICINE

## 2018-12-20 RX ORDER — VENLAFAXINE HYDROCHLORIDE 75 MG/1
75 CAPSULE, EXTENDED RELEASE ORAL DAILY
Qty: 30 CAPSULE | Refills: 2 | Status: SHIPPED | OUTPATIENT
Start: 2018-12-20 | End: 2019-02-15

## 2018-12-20 RX ORDER — AMLODIPINE BESYLATE 5 MG/1
5 TABLET ORAL DAILY
Qty: 30 TABLET | Refills: 3 | Status: SHIPPED | OUTPATIENT
Start: 2018-12-20 | End: 2019-04-11 | Stop reason: SDUPTHER

## 2018-12-20 ASSESSMENT — ENCOUNTER SYMPTOMS
BACK PAIN: 1
GASTROINTESTINAL NEGATIVE: 1
RESPIRATORY NEGATIVE: 1

## 2018-12-20 NOTE — TELEPHONE ENCOUNTER
Office has been notified that pt is requiring Prior Authorization for the following medication:venlafaxine (EFFEXOR XR) 75 MG extended release capsule        Please initiate this request through CoverMyMeds, contacting the following Payor/Insurance:Medicare    Please see below, or the documentation attached to this encounter for any additional information that may assist in processing PA:Recurrent major depressive disorder, in partial remission ([facility] [address] (F33.41); Anxiety (F41.9)    Thank you!

## 2018-12-20 NOTE — PROGRESS NOTES
5 MG tablet; Take 1 tablet by mouth daily  Return 1 month     Tobacco use disorder   Smoking cessation discussed with the patient       Numbness:  Discussed gabapentin and he has tried in the past. Also tried Cymbalta. States he has an EMG.        Marv Barr MD

## 2018-12-20 NOTE — TELEPHONE ENCOUNTER
Please contact the Pharmacy to have them run this medication under patient's prescription insurance.  They are running it under workers comp  [employer]

## 2018-12-21 NOTE — TELEPHONE ENCOUNTER
I went ahead and submitted it to [employer] (Key: [id number]) with chart note attached. We will see what happens.

## 2018-12-29 DIAGNOSIS — E78.5 HYPERLIPIDEMIA, UNSPECIFIED HYPERLIPIDEMIA TYPE: ICD-10-CM

## 2018-12-31 RX ORDER — ATORVASTATIN CALCIUM 10 MG/1
TABLET, FILM COATED ORAL
Qty: 90 TABLET | Refills: 0 | Status: SHIPPED | OUTPATIENT
Start: 2018-12-31 | End: 2019-03-30 | Stop reason: SDUPTHER

## 2019-02-12 ENCOUNTER — OFFICE VISIT (OUTPATIENT)
Dept: ENT CLINIC | Age: 59
End: 2019-02-12
Payer: MEDICARE

## 2019-02-12 VITALS
RESPIRATION RATE: 14 BRPM | BODY MASS INDEX: 30.35 KG/M2 | HEART RATE: 74 BPM | OXYGEN SATURATION: 95 % | DIASTOLIC BLOOD PRESSURE: 70 MMHG | SYSTOLIC BLOOD PRESSURE: 122 MMHG | HEIGHT: 70 IN | WEIGHT: 212 LBS

## 2019-02-12 DIAGNOSIS — J04.10 TRACHEITIS: Primary | ICD-10-CM

## 2019-02-12 PROCEDURE — 3017F COLORECTAL CA SCREEN DOC REV: CPT | Performed by: OTOLARYNGOLOGY

## 2019-02-12 PROCEDURE — G8417 CALC BMI ABV UP PARAM F/U: HCPCS | Performed by: OTOLARYNGOLOGY

## 2019-02-12 PROCEDURE — 99213 OFFICE O/P EST LOW 20 MIN: CPT | Performed by: OTOLARYNGOLOGY

## 2019-02-12 PROCEDURE — G8427 DOCREV CUR MEDS BY ELIG CLIN: HCPCS | Performed by: OTOLARYNGOLOGY

## 2019-02-12 PROCEDURE — 4004F PT TOBACCO SCREEN RCVD TLK: CPT | Performed by: OTOLARYNGOLOGY

## 2019-02-12 PROCEDURE — G8484 FLU IMMUNIZE NO ADMIN: HCPCS | Performed by: OTOLARYNGOLOGY

## 2019-02-12 RX ORDER — CEFDINIR 300 MG/1
300 CAPSULE ORAL 2 TIMES DAILY
Qty: 14 CAPSULE | Refills: 0 | Status: SHIPPED | OUTPATIENT
Start: 2019-02-12 | End: 2019-11-14

## 2019-02-15 ENCOUNTER — OFFICE VISIT (OUTPATIENT)
Dept: FAMILY MEDICINE CLINIC | Age: 59
End: 2019-02-15
Payer: MEDICARE

## 2019-02-15 VITALS
OXYGEN SATURATION: 98 % | HEART RATE: 79 BPM | BODY MASS INDEX: 32.43 KG/M2 | WEIGHT: 226 LBS | SYSTOLIC BLOOD PRESSURE: 130 MMHG | DIASTOLIC BLOOD PRESSURE: 74 MMHG

## 2019-02-15 DIAGNOSIS — G89.29 CHRONIC BILATERAL LOW BACK PAIN WITH SCIATICA, SCIATICA LATERALITY UNSPECIFIED: ICD-10-CM

## 2019-02-15 DIAGNOSIS — F33.2 SEVERE EPISODE OF RECURRENT MAJOR DEPRESSIVE DISORDER, WITHOUT PSYCHOTIC FEATURES (HCC): ICD-10-CM

## 2019-02-15 DIAGNOSIS — F33.41 RECURRENT MAJOR DEPRESSIVE DISORDER, IN PARTIAL REMISSION (HCC): ICD-10-CM

## 2019-02-15 DIAGNOSIS — M54.40 CHRONIC BILATERAL LOW BACK PAIN WITH SCIATICA, SCIATICA LATERALITY UNSPECIFIED: ICD-10-CM

## 2019-02-15 DIAGNOSIS — F17.200 TOBACCO USE DISORDER: ICD-10-CM

## 2019-02-15 DIAGNOSIS — F41.9 ANXIETY: Primary | ICD-10-CM

## 2019-02-15 DIAGNOSIS — I10 ESSENTIAL HYPERTENSION: ICD-10-CM

## 2019-02-15 PROCEDURE — 99214 OFFICE O/P EST MOD 30 MIN: CPT | Performed by: FAMILY MEDICINE

## 2019-02-15 RX ORDER — VENLAFAXINE HYDROCHLORIDE 150 MG/1
150 CAPSULE, EXTENDED RELEASE ORAL DAILY
Qty: 30 CAPSULE | Refills: 2 | Status: SHIPPED | OUTPATIENT
Start: 2019-02-15 | End: 2019-05-09 | Stop reason: SDUPTHER

## 2019-02-15 RX ORDER — ALPRAZOLAM 0.5 MG/1
0.5 TABLET ORAL 3 TIMES DAILY PRN
Qty: 90 TABLET | Refills: 0 | Status: SHIPPED | OUTPATIENT
Start: 2019-02-15 | End: 2019-03-14 | Stop reason: SDUPTHER

## 2019-02-15 ASSESSMENT — ENCOUNTER SYMPTOMS: COUGH: 1

## 2019-02-19 ENCOUNTER — TELEPHONE (OUTPATIENT)
Dept: ENT CLINIC | Age: 59
End: 2019-02-19

## 2019-02-19 ENCOUNTER — TELEPHONE (OUTPATIENT)
Dept: FAMILY MEDICINE CLINIC | Age: 59
End: 2019-02-19

## 2019-02-19 RX ORDER — LEVOFLOXACIN 500 MG/1
500 TABLET, FILM COATED ORAL DAILY
Qty: 7 TABLET | Refills: 0 | Status: SHIPPED | OUTPATIENT
Start: 2019-02-19 | End: 2019-02-26

## 2019-02-25 ENCOUNTER — TELEPHONE (OUTPATIENT)
Dept: FAMILY MEDICINE CLINIC | Age: 59
End: 2019-02-25

## 2019-02-25 DIAGNOSIS — R05.9 COUGH: Primary | ICD-10-CM

## 2019-02-28 ENCOUNTER — HOSPITAL ENCOUNTER (OUTPATIENT)
Dept: GENERAL RADIOLOGY | Age: 59
Discharge: HOME | End: 2019-02-28
Payer: MEDICARE

## 2019-02-28 ENCOUNTER — HOSPITAL ENCOUNTER (OUTPATIENT)
Age: 59
Discharge: HOME | End: 2019-02-28
Payer: MEDICARE

## 2019-02-28 DIAGNOSIS — R05.9 COUGH: ICD-10-CM

## 2019-02-28 PROCEDURE — 71046 X-RAY EXAM CHEST 2 VIEWS: CPT

## 2019-03-01 ENCOUNTER — TELEPHONE (OUTPATIENT)
Dept: FAMILY MEDICINE CLINIC | Age: 59
End: 2019-03-01

## 2019-03-01 RX ORDER — AZITHROMYCIN 250 MG/1
TABLET, FILM COATED ORAL
Qty: 6 TABLET | Refills: 0 | Status: SHIPPED | OUTPATIENT
Start: 2019-03-01 | End: 2019-03-11

## 2019-03-09 ENCOUNTER — TELEPHONE (OUTPATIENT)
Dept: FAMILY MEDICINE CLINIC | Age: 59
End: 2019-03-09

## 2019-03-14 ENCOUNTER — OFFICE VISIT (OUTPATIENT)
Dept: FAMILY MEDICINE CLINIC | Age: 59
End: 2019-03-14
Payer: MEDICARE

## 2019-03-14 VITALS
WEIGHT: 227.8 LBS | BODY MASS INDEX: 32.61 KG/M2 | OXYGEN SATURATION: 96 % | HEART RATE: 80 BPM | SYSTOLIC BLOOD PRESSURE: 122 MMHG | HEIGHT: 70 IN | DIASTOLIC BLOOD PRESSURE: 80 MMHG

## 2019-03-14 DIAGNOSIS — F41.9 ANXIETY: ICD-10-CM

## 2019-03-14 DIAGNOSIS — R05.9 COUGH: Primary | ICD-10-CM

## 2019-03-14 PROCEDURE — G8484 FLU IMMUNIZE NO ADMIN: HCPCS | Performed by: FAMILY MEDICINE

## 2019-03-14 PROCEDURE — 3017F COLORECTAL CA SCREEN DOC REV: CPT | Performed by: FAMILY MEDICINE

## 2019-03-14 PROCEDURE — 99213 OFFICE O/P EST LOW 20 MIN: CPT | Performed by: FAMILY MEDICINE

## 2019-03-14 PROCEDURE — 4004F PT TOBACCO SCREEN RCVD TLK: CPT | Performed by: FAMILY MEDICINE

## 2019-03-14 PROCEDURE — G8417 CALC BMI ABV UP PARAM F/U: HCPCS | Performed by: FAMILY MEDICINE

## 2019-03-14 PROCEDURE — G8427 DOCREV CUR MEDS BY ELIG CLIN: HCPCS | Performed by: FAMILY MEDICINE

## 2019-03-14 RX ORDER — ALPRAZOLAM 0.5 MG/1
TABLET ORAL
Qty: 90 TABLET | Refills: 0 | Status: SHIPPED | OUTPATIENT
Start: 2019-03-14 | End: 2019-04-11 | Stop reason: SDUPTHER

## 2019-03-14 RX ORDER — ALBUTEROL SULFATE 90 UG/1
2 AEROSOL, METERED RESPIRATORY (INHALATION) EVERY 6 HOURS PRN
Qty: 1 INHALER | Refills: 3 | Status: SHIPPED | OUTPATIENT
Start: 2019-03-14 | End: 2019-05-13

## 2019-03-14 RX ORDER — PREDNISONE 10 MG/1
TABLET ORAL
Qty: 30 TABLET | Refills: 0 | Status: SHIPPED | OUTPATIENT
Start: 2019-03-14 | End: 2019-03-24

## 2019-03-14 ASSESSMENT — ENCOUNTER SYMPTOMS
WHEEZING: 1
BACK PAIN: 1
GASTROINTESTINAL NEGATIVE: 1
SHORTNESS OF BREATH: 1
COUGH: 1

## 2019-03-27 ENCOUNTER — HOSPITAL ENCOUNTER (OUTPATIENT)
Dept: PULMONOLOGY | Age: 59
Discharge: HOME | End: 2019-03-27
Payer: MEDICARE

## 2019-03-27 VITALS — HEART RATE: 75 BPM | RESPIRATION RATE: 18 BRPM | OXYGEN SATURATION: 95 %

## 2019-03-27 DIAGNOSIS — R05.9 COUGH: ICD-10-CM

## 2019-03-27 LAB
DLCO %PRED: 88 %
DLCO PRED: NORMAL ML/MIN/MMHG
DLCO/VA %PRED: NORMAL %
DLCO/VA PRED: NORMAL ML/MIN/MMHG
DLCO/VA: NORMAL ML/MIN/MMHG
DLCO: NORMAL ML/MIN/MMHG
EXPIRATORY TIME-POST: NORMAL SEC
EXPIRATORY TIME: NORMAL SEC
FEF 25-75% %CHNG: NORMAL
FEF 25-75% %PRED-POST: NORMAL %
FEF 25-75% %PRED-PRE: NORMAL L/SEC
FEF 25-75% PRED: NORMAL L/SEC
FEF 25-75%-POST: NORMAL L/SEC
FEF 25-75%-PRE: NORMAL L/SEC
FEV1 %PRED-POST: 61 %
FEV1 %PRED-PRE: 57 %
FEV1 PRED: NORMAL L
FEV1-POST: NORMAL L
FEV1-PRE: NORMAL L
FEV1/FVC %PRED-POST: NORMAL %
FEV1/FVC %PRED-PRE: NORMAL %
FEV1/FVC PRED: NORMAL %
FEV1/FVC-POST: 57 %
FEV1/FVC-PRE: 56 %
FVC %PRED-POST: NORMAL L
FVC %PRED-PRE: NORMAL %
FVC PRED: NORMAL L
FVC-POST: NORMAL L
FVC-PRE: NORMAL L
GAW %PRED: NORMAL %
GAW PRED: NORMAL L/S/CMH2O
GAW: NORMAL L/S/CMH2O
IC %PRED: NORMAL %
IC PRED: NORMAL L
IC: NORMAL L
MEP: NORMAL
MIP: NORMAL
MVV %PRED-PRE: NORMAL %
MVV PRED: NORMAL L/MIN
MVV-PRE: NORMAL L/MIN
PEF %PRED-POST: NORMAL %
PEF %PRED-PRE: NORMAL L/SEC
PEF PRED: NORMAL L/SEC
PEF%CHNG: NORMAL
PEF-POST: NORMAL L/SEC
PEF-PRE: NORMAL L/SEC
RAW %PRED: NORMAL %
RAW PRED: NORMAL CMH2O/L/S
RAW: NORMAL CMH2O/L/S
RV %PRED: NORMAL %
RV PRED: NORMAL L
RV: NORMAL L
SVC %PRED: NORMAL %
SVC PRED: NORMAL L
SVC: NORMAL L
TLC %PRED: 91 %
TLC PRED: NORMAL L
TLC: NORMAL L
VA %PRED: NORMAL %
VA PRED: NORMAL L
VA: NORMAL L
VTG %PRED: NORMAL %
VTG PRED: NORMAL L
VTG: NORMAL L

## 2019-03-27 PROCEDURE — 6370000000 HC RX 637 (ALT 250 FOR IP): Performed by: FAMILY MEDICINE

## 2019-03-27 PROCEDURE — 94200 LUNG FUNCTION TEST (MBC/MVV): CPT

## 2019-03-27 PROCEDURE — 94760 N-INVAS EAR/PLS OXIMETRY 1: CPT

## 2019-03-27 PROCEDURE — 94060 EVALUATION OF WHEEZING: CPT

## 2019-03-27 PROCEDURE — 94726 PLETHYSMOGRAPHY LUNG VOLUMES: CPT

## 2019-03-27 PROCEDURE — 94729 DIFFUSING CAPACITY: CPT

## 2019-03-27 RX ORDER — ALBUTEROL SULFATE 90 UG/1
4 AEROSOL, METERED RESPIRATORY (INHALATION) ONCE
Status: COMPLETED | OUTPATIENT
Start: 2019-03-27 | End: 2019-03-27

## 2019-03-27 RX ADMIN — Medication 4 PUFF: at 14:30

## 2019-03-27 ASSESSMENT — PULMONARY FUNCTION TESTS
FEV1_PERCENT_PREDICTED_PRE: 57
FEV1/FVC_POST: 57
FEV1/FVC_PRE: 56
FEV1_PERCENT_PREDICTED_POST: 61

## 2019-03-30 DIAGNOSIS — E78.5 HYPERLIPIDEMIA, UNSPECIFIED HYPERLIPIDEMIA TYPE: ICD-10-CM

## 2019-04-01 ENCOUNTER — TELEPHONE (OUTPATIENT)
Dept: FAMILY MEDICINE CLINIC | Age: 59
End: 2019-04-01

## 2019-04-01 RX ORDER — ATORVASTATIN CALCIUM 10 MG/1
TABLET, FILM COATED ORAL
Qty: 90 TABLET | Refills: 1 | Status: SHIPPED | OUTPATIENT
Start: 2019-04-01 | End: 2019-10-10 | Stop reason: SDUPTHER

## 2019-04-04 ENCOUNTER — OFFICE VISIT (OUTPATIENT)
Dept: FAMILY MEDICINE CLINIC | Age: 59
End: 2019-04-04
Payer: MEDICARE

## 2019-04-04 VITALS
DIASTOLIC BLOOD PRESSURE: 78 MMHG | HEART RATE: 101 BPM | OXYGEN SATURATION: 96 % | BODY MASS INDEX: 32.92 KG/M2 | SYSTOLIC BLOOD PRESSURE: 122 MMHG | WEIGHT: 229.4 LBS

## 2019-04-04 DIAGNOSIS — Z72.0 TOBACCO ABUSE: ICD-10-CM

## 2019-04-04 DIAGNOSIS — J43.9 PULMONARY EMPHYSEMA, UNSPECIFIED EMPHYSEMA TYPE (HCC): Primary | ICD-10-CM

## 2019-04-04 DIAGNOSIS — F41.9 ANXIETY: ICD-10-CM

## 2019-04-04 DIAGNOSIS — Z13.31 POSITIVE DEPRESSION SCREENING: ICD-10-CM

## 2019-04-04 DIAGNOSIS — R06.83 SNORING: ICD-10-CM

## 2019-04-04 DIAGNOSIS — F33.1 MODERATE EPISODE OF RECURRENT MAJOR DEPRESSIVE DISORDER (HCC): ICD-10-CM

## 2019-04-04 PROCEDURE — G8431 POS CLIN DEPRES SCRN F/U DOC: HCPCS | Performed by: FAMILY MEDICINE

## 2019-04-04 PROCEDURE — G0444 DEPRESSION SCREEN ANNUAL: HCPCS | Performed by: FAMILY MEDICINE

## 2019-04-04 PROCEDURE — G8926 SPIRO NO PERF OR DOC: HCPCS | Performed by: FAMILY MEDICINE

## 2019-04-04 PROCEDURE — G8427 DOCREV CUR MEDS BY ELIG CLIN: HCPCS | Performed by: FAMILY MEDICINE

## 2019-04-04 PROCEDURE — 99214 OFFICE O/P EST MOD 30 MIN: CPT | Performed by: FAMILY MEDICINE

## 2019-04-04 PROCEDURE — 3023F SPIROM DOC REV: CPT | Performed by: FAMILY MEDICINE

## 2019-04-04 PROCEDURE — 3017F COLORECTAL CA SCREEN DOC REV: CPT | Performed by: FAMILY MEDICINE

## 2019-04-04 PROCEDURE — 4004F PT TOBACCO SCREEN RCVD TLK: CPT | Performed by: FAMILY MEDICINE

## 2019-04-04 PROCEDURE — G8417 CALC BMI ABV UP PARAM F/U: HCPCS | Performed by: FAMILY MEDICINE

## 2019-04-04 ASSESSMENT — PATIENT HEALTH QUESTIONNAIRE - PHQ9
SUM OF ALL RESPONSES TO PHQ QUESTIONS 1-9: 18
5. POOR APPETITE OR OVEREATING: 1
8. MOVING OR SPEAKING SO SLOWLY THAT OTHER PEOPLE COULD HAVE NOTICED. OR THE OPPOSITE, BEING SO FIGETY OR RESTLESS THAT YOU HAVE BEEN MOVING AROUND A LOT MORE THAN USUAL: 3
3. TROUBLE FALLING OR STAYING ASLEEP: 3
7. TROUBLE CONCENTRATING ON THINGS, SUCH AS READING THE NEWSPAPER OR WATCHING TELEVISION: 3
6. FEELING BAD ABOUT YOURSELF - OR THAT YOU ARE A FAILURE OR HAVE LET YOURSELF OR YOUR FAMILY DOWN: 2
2. FEELING DOWN, DEPRESSED OR HOPELESS: 1
4. FEELING TIRED OR HAVING LITTLE ENERGY: 3
SUM OF ALL RESPONSES TO PHQ9 QUESTIONS 1 & 2: 3
1. LITTLE INTEREST OR PLEASURE IN DOING THINGS: 2
SUM OF ALL RESPONSES TO PHQ QUESTIONS 1-9: 18
9. THOUGHTS THAT YOU WOULD BE BETTER OFF DEAD, OR OF HURTING YOURSELF: 0
10. IF YOU CHECKED OFF ANY PROBLEMS, HOW DIFFICULT HAVE THESE PROBLEMS MADE IT FOR YOU TO DO YOUR WORK, TAKE CARE OF THINGS AT HOME, OR GET ALONG WITH OTHER PEOPLE: 3

## 2019-04-04 ASSESSMENT — ENCOUNTER SYMPTOMS
SHORTNESS OF BREATH: 1
BACK PAIN: 1
GASTROINTESTINAL NEGATIVE: 1

## 2019-04-04 NOTE — PROGRESS NOTES
inconsistencies noted   The patient understands the risks of dependency/addiction with alprazolam and will take as little as possible and discontinue as soon as possible     Moderate episode of recurrent major depressive disorder (HCC)  On the basis of positive PHQ-9 screening (PHQ-9 Total Score: 18), the following plan was implemented: He is already on venlafaxine. Patient will follow-up in 1 month(s) with PCP for COPD follow-up. Tobacco abuse  Smoking cessation discussed with the patient and his partner and he is once again strongly encouraged to stop smoking. He realizes his COPD will definitely worsen with continued smoking.   Snoring  -     Danii Glover MD, Sleep Medicine, [facility]    Positive depression screening  -     Positive Screen for Clinical Depression with a Documented Follow-up Plan                Rachel Macias MD

## 2019-04-04 NOTE — PATIENT INSTRUCTIONS
Patient Education        Learning About Relief for Back Pain  What is back tension and strain? Back strain happens when you overstretch, or pull, a muscle in your back. You may hurt your back in an accident or when you exercise or lift something. Most back pain will get better with rest and time. You can take care of yourself at home to help your back heal.  What can you do first to relieve back pain? When you first feel back pain, try these steps:  · Walk. Take a short walk (10 to 20 minutes) on a level surface (no slopes, hills, or stairs) every 2 to 3 hours. Walk only distances you can manage without pain, especially leg pain. · Relax. Find a comfortable position for rest. Some people are comfortable on the floor or a medium-firm bed with a small pillow under their head and another under their knees. Some people prefer to lie on their side with a pillow between their knees. Don't stay in one position for too long. · Try heat or ice. Try using a heating pad on a low or medium setting, or take a warm shower, for 15 to 20 minutes every 2 to 3 hours. Or you can buy single-use heat wraps that last up to 8 hours. You can also try an ice pack for 10 to 15 minutes every 2 to 3 hours. You can use an ice pack or a bag of frozen vegetables wrapped in a thin towel. There is not strong evidence that either heat or ice will help, but you can try them to see if they help. You may also want to try switching between heat and cold. · Take pain medicine exactly as directed. ? If the doctor gave you a prescription medicine for pain, take it as prescribed. ? If you are not taking a prescription pain medicine, ask your doctor if you can take an over-the-counter medicine. What else can you do? · Stretch and exercise. Exercises that increase flexibility may relieve your pain and make it easier for your muscles to keep your spine in a good, neutral position. And don't forget to keep walking. · Do self-massage.  You can use self-massage to unwind after work or school or to energize yourself in the morning. You can easily massage your feet, hands, or neck. Self-massage works best if you are in comfortable clothes and are sitting or lying in a comfortable position. Use oil or lotion to massage bare skin. · Reduce stress. Back pain can lead to a vicious [place]: Distress about the pain tenses the muscles in your back, which in turn causes more pain. Learn how to relax your mind and your muscles to lower your stress. Where can you learn more? Go to https://[employer] Limitedeb.[employer]. org and sign in to your Fuzz account. Enter Y232 in the "[employer] The Simple, Fast and Elegant Tablet Sales App" box to learn more about \"Learning About Relief for Back Pain. \"     If you do not have an account, please click on the \"Sign Up Now\" link. Current as of: September 20, 2018  Content Version: 11.9  © 8848-0611 [employer], Incorporated. Care instructions adapted under license by [facility] ([facility]). If you have questions about a medical condition or this instruction, always ask your healthcare professional. [place] [address] any warranty or liability for your use of this information.

## 2019-04-08 ENCOUNTER — TELEPHONE (OUTPATIENT)
Dept: FAMILY MEDICINE CLINIC | Age: 59
End: 2019-04-08

## 2019-04-08 DIAGNOSIS — J43.9 PULMONARY EMPHYSEMA, UNSPECIFIED EMPHYSEMA TYPE (HCC): ICD-10-CM

## 2019-04-11 DIAGNOSIS — F41.9 ANXIETY: ICD-10-CM

## 2019-04-11 DIAGNOSIS — I10 ESSENTIAL HYPERTENSION: ICD-10-CM

## 2019-04-11 RX ORDER — AMLODIPINE BESYLATE 5 MG/1
TABLET ORAL
Qty: 30 TABLET | Refills: 3 | Status: SHIPPED | OUTPATIENT
Start: 2019-04-11 | End: 2019-07-31 | Stop reason: SDUPTHER

## 2019-04-11 RX ORDER — ALPRAZOLAM 0.5 MG/1
TABLET ORAL
Qty: 90 TABLET | Refills: 0 | Status: SHIPPED | OUTPATIENT
Start: 2019-04-11 | End: 2019-05-09 | Stop reason: SDUPTHER

## 2019-04-15 DIAGNOSIS — J43.9 PULMONARY EMPHYSEMA, UNSPECIFIED EMPHYSEMA TYPE (HCC): ICD-10-CM

## 2019-05-09 DIAGNOSIS — F41.9 ANXIETY: ICD-10-CM

## 2019-05-09 DIAGNOSIS — F33.41 RECURRENT MAJOR DEPRESSIVE DISORDER, IN PARTIAL REMISSION (HCC): ICD-10-CM

## 2019-05-09 RX ORDER — ALPRAZOLAM 0.5 MG/1
TABLET ORAL
Qty: 90 TABLET | Refills: 0 | Status: SHIPPED | OUTPATIENT
Start: 2019-05-09 | End: 2019-06-04 | Stop reason: SDUPTHER

## 2019-05-09 RX ORDER — VENLAFAXINE HYDROCHLORIDE 150 MG/1
CAPSULE, EXTENDED RELEASE ORAL
Qty: 30 CAPSULE | Refills: 2 | Status: SHIPPED | OUTPATIENT
Start: 2019-05-09 | End: 2019-06-04

## 2019-05-10 DIAGNOSIS — J43.9 PULMONARY EMPHYSEMA, UNSPECIFIED EMPHYSEMA TYPE (HCC): ICD-10-CM

## 2019-05-10 NOTE — TELEPHONE ENCOUNTER
Pt wants to come in for a sooner appt, states he couldn't wait for the time that was offered. Pt also wanted to know if he could get any samples of ANORO ELLIPTA 62.5-25 MCG to hold him over until he's able to be seen. Please give pt a call back.

## 2019-05-13 ENCOUNTER — OFFICE VISIT (OUTPATIENT)
Dept: FAMILY MEDICINE CLINIC | Age: 59
End: 2019-05-13
Payer: MEDICARE

## 2019-05-13 VITALS
BODY MASS INDEX: 30.96 KG/M2 | WEIGHT: 215.8 LBS | OXYGEN SATURATION: 96 % | SYSTOLIC BLOOD PRESSURE: 130 MMHG | HEART RATE: 78 BPM | DIASTOLIC BLOOD PRESSURE: 88 MMHG

## 2019-05-13 DIAGNOSIS — F17.200 TOBACCO USE DISORDER: ICD-10-CM

## 2019-05-13 DIAGNOSIS — J43.9 PULMONARY EMPHYSEMA, UNSPECIFIED EMPHYSEMA TYPE (HCC): ICD-10-CM

## 2019-05-13 DIAGNOSIS — F33.41 RECURRENT MAJOR DEPRESSIVE DISORDER, IN PARTIAL REMISSION (HCC): Primary | ICD-10-CM

## 2019-05-13 DIAGNOSIS — Z11.3 SCREEN FOR STD (SEXUALLY TRANSMITTED DISEASE): ICD-10-CM

## 2019-05-13 DIAGNOSIS — I10 ESSENTIAL HYPERTENSION: ICD-10-CM

## 2019-05-13 DIAGNOSIS — F41.9 ANXIETY: ICD-10-CM

## 2019-05-13 PROCEDURE — 99214 OFFICE O/P EST MOD 30 MIN: CPT | Performed by: FAMILY MEDICINE

## 2019-05-13 ASSESSMENT — ENCOUNTER SYMPTOMS
SHORTNESS OF BREATH: 1
COUGH: 1
GASTROINTESTINAL NEGATIVE: 1
BACK PAIN: 1
WHEEZING: 1

## 2019-05-13 NOTE — PROGRESS NOTES
Subjective:      Patient ID: Mark Puri is a 66 y.o. male. HPI  Anxiety/OARRS visit and med check. He continues to need med for anxiety and sleep. He states he would like to try 1 mg alprazolam and states he is jittery and anxious  Depression: He is on 150 mg venlafaxine and thinks he felt better on 75 mg   COPD: After stopping smoking, his breathing is better. Also on samples of Anoro  Hypertension: -140 range when he checks. On amlodipine  Tobacco abuse: He quit smoking 2 ppd on May 5 and states he notes improvement in his breathing already. Quit cold turkey. Ears: had tubes in past and starting to feel clogged again. He would like his ears checked. Review of Systems   Constitutional: Negative. Respiratory: Positive for cough, shortness of breath and wheezing. Cardiovascular: Negative. Gastrointestinal: Negative. Genitourinary: Negative. Musculoskeletal: Positive for back pain. Objective:   Physical Exam   Constitutional: He is oriented to person, place, and time. No distress. HENT:   Bilateral PE tubes appear to be in canals   Neck: Neck supple. Carotid bruit is not present. No thyromegaly present. Cardiovascular: Normal rate and regular rhythm. Pulmonary/Chest: Effort normal. He has wheezes (bilateral). He has no rales. Musculoskeletal: He exhibits no edema. Lymphadenopathy:     He has no cervical adenopathy. Neurological: He is alert and oriented to person, place, and time. Skin: Skin is warm and dry. Psychiatric: He has a normal mood and affect. His behavior is normal. Judgment and thought content normal.     Assessment/Plan:    Guillermo Garza was seen today for depression and anxiety. Diagnoses and all orders for this visit:    Recurrent major depressive disorder, in partial remission [facility])  Patient states he would like to reduce his venlafaxine to 75 mg. He was reminded to let us know when his next refill is due.   Anxiety  Patient requested on the next refill that we increase this to 1 mg. He was reminded to let us know his wishes at the time we refilled this. OARRS report reviewed and no inconsistencies noted   The patient understands the risks of dependency/addiction with alprazolam and will take as little as possible and discontinue as soon as possible     Pulmonary emphysema, unspecified emphysema type ([address]  Improved with discontinuation of smoking. He was given samples of Anoro which he states he cannot afford. He feels this does help him. We also suggested seeing a pulmonary specialist.  He will check and see what inhalers are covered on his insurance in place of Anoro  Essential hypertension  Borderline/reasonable control. Continue current treatment. Home blood pressure checks. Return 3 months  Tobacco use disorder  Patient has quit smoking approximately 8 days ago. And states he already notes a difference. Screen for STD (sexually transmitted disease)  Patient requested below lab tests  -     C.trachomatis N.gonorrhoeae DNA, Urine;  Future  -     C.trachomatis N.gonorrhoeae DNA, Urine           Lele Epps MD

## 2019-05-14 ENCOUNTER — CARE COORDINATION (OUTPATIENT)
Dept: CARE COORDINATION | Age: 59
End: 2019-05-14

## 2019-05-14 ASSESSMENT — PATIENT HEALTH QUESTIONNAIRE - PHQ9
SUM OF ALL RESPONSES TO PHQ QUESTIONS 1-9: 2
SUM OF ALL RESPONSES TO PHQ QUESTIONS 1-9: 2

## 2019-05-14 ASSESSMENT — ENCOUNTER SYMPTOMS: DYSPNEA ASSOCIATED WITH: EXERTION

## 2019-05-14 NOTE — PATIENT INSTRUCTIONS

## 2019-05-14 NOTE — CARE COORDINATION
Ambulatory Care Coordination Note  5/14/2019  CM Risk Score: 2  [place] Mortality Risk Score:      ACC: Giselle Jensen RN    Summary Note: Outreach call to pt per PCP referral regarding issues related to medication cost. Pt voiced he has Medicare A/B/D and uses [employer] for his medication cost. Pt is on SSDI. Pt voiced he hasn't smoked since 5/5 and feels the improvement from not smoking. Pt encouraged to call [employer] to see what medication alternatives he has and may be more cost effective for him being on a fixed income. Pt also provided with number to call [address] [place] [address] to see if he is able to utilize them related to medication cost and limited income. ACC to mail pt info about Mina Lockhart and contact information and f/u in  Next week. Pt voiced appreciation.      COPD Assessment    Does the patient understand envrionmental exposure?:  Yes  Is the patient able to verbalize Rescue vs. Long Acting medications?:  No  Does the patient have a nebulizer?:  No  Does the patient use a space with inhaled medications?:  No     No patient-reported symptoms         Symptoms:   None:  Yes      Symptom course:  stable  Breathlessness:  exertion  Increase use of rapid acting/rescue inhaled medications?:  No  Change in chronic cough?:  No/At Baseline  Change in sputum?:  No/At Baseline  Sputum characteristics:  Clear, Thin  Self Monitoring - SaO2:  No  Have you had a recent diagnosis of pneumonia either by PCP or at a hospital?:  No       Ambulatory Care Coordination Assessment    Care Coordination Protocol  Program Enrollment:  Rising Risk  Referral from Primary Care Provider:  Yes  Week 1 - Initial Assessment     Do you have all of your prescriptions and are they filled?:  Yes  Barriers to medication adherence:  Other  Other barriers to medication adherence:  cost  Are you able to afford your medications?:  With Assistance  Medication Assistance Program:  Low cost pharmacy, Other  Other Assistance Program: samples  How often do you have trouble taking your medications the way you have been told to take them?:  Sometimes I take them as prescribed. Do you have Home O2 Therapy?:  No      Ability to seek help/take action for Emergent Urgent situations i.e. fire, crime, inclement weather or health crisis. :  Independent  Ability to ambulate to restroom:  Independent  Ability handle personal hygeine needs (bathing/dressing/grooming): Independent  Ability to manage Medications: Independent  Ability to prepare Food Preparation:  Independent  Ability to maintain home (clean home, laundry): Independent  Ability to drive and/or has transportation:  Independent  Ability to do shopping:  Independent  Ability to manage finances: Independent  Is patient able to live independently?:  Yes     Current Housing:  Apartment        Per the Fall Risk Screening, did the patient have 2 or more falls or 1 fall with injury in the past year?:  No     Frequent urination at night?:  No  Do you use rails/bars?:  No  Do you have a non-slip tub mat?:  Yes     Are you experiencing loss of meaning?:  No  Are you experiencing loss of hope and peace?:  No     Thinking about your patient's physical health needs, are there any symptoms or problems (risk indicators) you are unsure about that require further investigation?:  Mild vague physical symptoms or problems; but do not impact on daily life or are not of concern to patient   Are the patients physical health problems impacting on their mental well-being?:  Mild impact on mental well-being e.g. \"\"feeling fed-up\"\", \"\"reduced enjoyment\"\"   Are there any problems with your patients lifestyle behaviors (alcohol, drugs, diet, exercise) that are impacting on physical or mental well-being?:  Some mild concern of potential negative impact on well-being   Do you have any other concerns about your patients mental well-being?  How would you rate their severity and impact on the patient?:  Mild problems - don't interfere with function   How would you rate their home environment in terms of safety and stability (including domestic violence, insecure housing, neighbor harassment)?:  Safe, stable, but with some inconsistency   How do daily activities impact on the patient's well-being? (include current or anticipated unemployment, work, caregiving, access to transportation or other):  Some general dissatisfaction but no concern   How would you rate their social network (family, work, friends)?:  Adequate participation with social networks   How would you rate their financial resources (including ability to afford all required medical care)?:  [address] insecure, some resource challenges   How wells does the patient now understand their health and well-being (symptoms, signs or risk factors) and what they need to do to manage their health?:  Reasonable to good understanding but do not feel able to engage with advice at this time   How well do you think your patient can engage in healthcare discussions? (Barriers include language, deafness, aphasia, alcohol or drug problems, learning difficulties, concentration): Adequate communication, with or without minor barriers   Do other services need to be involved to help this patient?:  Other care/services in place and adequate   Are current services involved with this patient well-coordinated? (Include coordination with other services you are now recommendation):  Required care/services in place and adequately coordinated   Suggested Interventions and Community Resources   Disease Specific Clinic:  Completed (Comment: See Care Team)   Medication Assistance Program:  In Process   Zone Management Tools:  Completed         Set up/Review an Education Plan, Set up/Review Goals              Prior to Admission medications    Medication Sig Start Date End Date Taking?  Authorizing Provider   umeclidinium-vilanterol (ANORO ELLIPTA) 62.5-25 MCG/INH AEPB inhaler INHALE 1 PUFF BY MOUTH

## 2019-05-15 ENCOUNTER — TELEPHONE (OUTPATIENT)
Dept: FAMILY MEDICINE CLINIC | Age: 59
End: 2019-05-15

## 2019-05-15 DIAGNOSIS — J43.9 PULMONARY EMPHYSEMA, UNSPECIFIED EMPHYSEMA TYPE (HCC): Primary | ICD-10-CM

## 2019-05-15 NOTE — CARE COORDINATION
Pt called office asking to speak with [facility]. Outreach call to pt per request. Per pt he spoke with [facility] who advised him Jenny is a Tier 3. Pt is responsible for out of pocket deductible $350, once paid his inhaler will be $33 per month. Pt voiced his RX's need to go to a preferred pharmacy ([facility]). Pt talked to [facility] regarding his income which is \"roughly\" $3993, pt also receives an annuity of $990. Pt voiced he pays child support and his annuity afterwards is approximately $760.00 totaling $2379 (not including his wife's income). With provided income pt is above eligibility for Medicare Extra Help or to get medications at [facility]. Pt is also  and his yearly combined income is roughly $60,000 with his wife's income. Pt requesting RX to be sent to [facility]. Pt aware it may take until tomorrow for RX to be filled. Pt advised he is able to call with any further questions or concerns.     Mendel Every BSN, RN Care Coordinator  [address] [place] [address],  [address] [place] [address] Primary Care   [phone]

## 2019-05-15 NOTE — TELEPHONE ENCOUNTER
Last OV with PCP: Dr. Mia Chin 5/13/2019. Pt requesting RX be sent to [facility] ([facility] pharmacy) for monthly RX with refills for a year. RX t'd. Please has MA notify pt once RX has been sent in.

## 2019-05-16 LAB
C. TRACHOMATIS DNA ,URINE: NEGATIVE
N. GONORRHOEAE DNA, URINE: NEGATIVE

## 2019-06-04 ENCOUNTER — TELEPHONE (OUTPATIENT)
Dept: FAMILY MEDICINE CLINIC | Age: 59
End: 2019-06-04

## 2019-06-04 DIAGNOSIS — F41.9 ANXIETY: ICD-10-CM

## 2019-06-04 RX ORDER — VENLAFAXINE HYDROCHLORIDE 75 MG/1
75 CAPSULE, EXTENDED RELEASE ORAL DAILY
Qty: 90 CAPSULE | Refills: 1 | Status: SHIPPED | OUTPATIENT
Start: 2019-06-04 | End: 2019-11-14 | Stop reason: SDUPTHER

## 2019-06-04 RX ORDER — ALPRAZOLAM 0.5 MG/1
TABLET ORAL
Qty: 90 TABLET | Refills: 0 | Status: SHIPPED | OUTPATIENT
Start: 2019-06-06 | End: 2019-06-04

## 2019-06-04 RX ORDER — ALPRAZOLAM 1 MG/1
1 TABLET ORAL 3 TIMES DAILY
Qty: 90 TABLET | Refills: 0 | Status: SHIPPED | OUTPATIENT
Start: 2019-06-04 | End: 2019-07-02 | Stop reason: SDUPTHER

## 2019-06-04 NOTE — TELEPHONE ENCOUNTER
Pt requested Xanax be increased from 0.5MG to 1MG . Pt also requested  Effexor XR and for dose to be lowered to 75MG. Both changes are noted in OV notes from 5/13/19 Please advise if changes are still appropriate.  If so, please send update Rx for each to pharmacy

## 2019-06-04 NOTE — TELEPHONE ENCOUNTER
Patient notified and he states the wife brought a heating thing home and he wants to try that and see how it goes before picking that from the pharmacy

## 2019-06-04 NOTE — TELEPHONE ENCOUNTER
Proctofoam HC was sent to [facility]. If this is not covered sometimes these preparations are expensive. If the pharmacist has something that would be less expensive that can be substituted for this he can let me know and I am willing to change.

## 2019-06-04 NOTE — TELEPHONE ENCOUNTER
Pt is requesting Rx for hemorrhoid flare up. Pt states pain/inflamation began 6/2/19 and pt has been using preparation H cream with heating pad. Per pt hemorrhoids are not bleeding. Per pt he had diarrhea for 2-3 days which he thinks caused hemorrhoids flared up. Pt also states he is nauseous.    Pharmacy verified

## 2019-06-04 NOTE — TELEPHONE ENCOUNTER
Medication and Quantity requested: ALPRAZolam (XANAX) 1 MG tablet     venlafaxine (EFFEXOR XR) 75 MG extended release capsule    Last Visit  5/13/19    Last filled  5/9/19    Pharmacy and phone number updated in [facility]:  yes

## 2019-06-27 ENCOUNTER — TELEPHONE (OUTPATIENT)
Dept: FAMILY MEDICINE CLINIC | Age: 59
End: 2019-06-27

## 2019-06-27 NOTE — TELEPHONE ENCOUNTER
Patient called re: Alprazolam RX  This should be covered by his Worker's comp - ID # [id number]  Patient ha been paying out of pocket because his insurance does not cover   PA is required for [employer] states that a PA has been sent but the office has not received  There is not an active script so a PA can not be generated by [facility]  DX- F41.9  [facility] states this should be listed on Covermymeds  Please start PA

## 2019-07-02 ENCOUNTER — TELEPHONE (OUTPATIENT)
Dept: FAMILY MEDICINE CLINIC | Age: 59
End: 2019-07-02

## 2019-07-02 DIAGNOSIS — F41.9 ANXIETY: ICD-10-CM

## 2019-07-02 RX ORDER — ALPRAZOLAM 1 MG/1
TABLET ORAL
Qty: 90 TABLET | Refills: 0 | Status: SHIPPED | OUTPATIENT
Start: 2019-07-02 | End: 2019-08-01 | Stop reason: SDUPTHER

## 2019-07-02 RX ORDER — ALPRAZOLAM 1 MG/1
1 TABLET ORAL 3 TIMES DAILY
Qty: 90 TABLET | Refills: 0 | Status: CANCELLED | OUTPATIENT
Start: 2019-07-02 | End: 2019-08-01

## 2019-07-15 ENCOUNTER — TELEPHONE (OUTPATIENT)
Dept: FAMILY MEDICINE CLINIC | Age: 59
End: 2019-07-15

## 2019-07-19 ENCOUNTER — NURSE TRIAGE (OUTPATIENT)
Dept: OTHER | Facility: CLINIC | Age: 59
End: 2019-07-19

## 2019-07-19 ENCOUNTER — TELEPHONE (OUTPATIENT)
Dept: FAMILY MEDICINE CLINIC | Age: 59
End: 2019-07-19

## 2019-07-19 NOTE — TELEPHONE ENCOUNTER
The best thing we can do is attempt to call him and encourage him to be seen, whether it is at the ED tonight or tomorrow in the Saturday clinic.

## 2019-07-19 NOTE — TELEPHONE ENCOUNTER
Called and spoke with pt, pt states he is going to wait till  Monday and see Dr. Norris Vaz. States he was on the other line when I called to schedule his appt.

## 2019-07-31 DIAGNOSIS — F41.9 ANXIETY: ICD-10-CM

## 2019-08-01 DIAGNOSIS — F41.9 ANXIETY: ICD-10-CM

## 2019-08-01 RX ORDER — ALPRAZOLAM 1 MG/1
TABLET ORAL
Qty: 90 TABLET | Refills: 0 | OUTPATIENT
Start: 2019-08-01 | End: 2019-09-01

## 2019-08-01 RX ORDER — ALPRAZOLAM 1 MG/1
TABLET ORAL
Qty: 90 TABLET | Refills: 0 | Status: SHIPPED | OUTPATIENT
Start: 2019-08-01 | End: 2019-09-01

## 2019-08-05 ENCOUNTER — TELEPHONE (OUTPATIENT)
Dept: FAMILY MEDICINE CLINIC | Age: 59
End: 2019-08-05

## 2019-08-05 NOTE — TELEPHONE ENCOUNTER
Submitted PA for Venlafaxine HCl ER 75MG er capsules, [employer] - Rx #: [id number]  Via CMM STATUS: PENDING

## 2019-08-06 NOTE — TELEPHONE ENCOUNTER
Per [place] [facility], Venlafaxine HCl ER 75MG er capsules are already payable for this claim. Letter attached. Please advise patient. Thank you.

## 2019-08-12 ENCOUNTER — OFFICE VISIT (OUTPATIENT)
Dept: FAMILY MEDICINE CLINIC | Age: 59
End: 2019-08-12
Payer: COMMERCIAL

## 2019-08-12 VITALS
BODY MASS INDEX: 31.28 KG/M2 | WEIGHT: 218 LBS | SYSTOLIC BLOOD PRESSURE: 132 MMHG | HEART RATE: 74 BPM | OXYGEN SATURATION: 95 % | DIASTOLIC BLOOD PRESSURE: 80 MMHG

## 2019-08-12 DIAGNOSIS — I10 ESSENTIAL HYPERTENSION: ICD-10-CM

## 2019-08-12 DIAGNOSIS — F33.41 RECURRENT MAJOR DEPRESSIVE DISORDER, IN PARTIAL REMISSION (HCC): ICD-10-CM

## 2019-08-12 DIAGNOSIS — Z12.5 PROSTATE CANCER SCREENING: ICD-10-CM

## 2019-08-12 DIAGNOSIS — F41.9 ANXIETY: Primary | ICD-10-CM

## 2019-08-12 DIAGNOSIS — R60.0 PEDAL EDEMA: ICD-10-CM

## 2019-08-12 DIAGNOSIS — J43.9 PULMONARY EMPHYSEMA, UNSPECIFIED EMPHYSEMA TYPE (HCC): ICD-10-CM

## 2019-08-12 DIAGNOSIS — M54.16 LUMBAR RADICULOPATHY: ICD-10-CM

## 2019-08-12 DIAGNOSIS — Z20.2 POSSIBLE EXPOSURE TO STD: ICD-10-CM

## 2019-08-12 DIAGNOSIS — Z72.0 TOBACCO ABUSE: ICD-10-CM

## 2019-08-12 DIAGNOSIS — E78.5 HYPERLIPIDEMIA, UNSPECIFIED HYPERLIPIDEMIA TYPE: ICD-10-CM

## 2019-08-12 PROCEDURE — 99214 OFFICE O/P EST MOD 30 MIN: CPT | Performed by: FAMILY MEDICINE

## 2019-08-12 ASSESSMENT — ENCOUNTER SYMPTOMS
SHORTNESS OF BREATH: 1
BACK PAIN: 1

## 2019-08-12 NOTE — PATIENT INSTRUCTIONS
Patient Education        Anxiety Disorder: Care Instructions  Your Care Instructions    Anxiety is a normal reaction to stress. Difficult situations can cause you to have symptoms such as sweaty palms and a nervous feeling. In an anxiety disorder, the symptoms are far more severe. Constant worry, muscle tension, trouble sleeping, nausea and diarrhea, and other symptoms can make normal daily activities difficult or impossible. These symptoms may occur for no reason, and they can affect your work, school, or social life. Medicines, counseling, and self-care can all help. Follow-up care is a key part of your treatment and safety. Be sure to make and go to all appointments, and call your doctor if you are having problems. It's also a good idea to know your test results and keep a list of the medicines you take. How can you care for yourself at home? · Take medicines exactly as directed. Call your doctor if you think you are having a problem with your medicine. · Go to your counseling sessions and follow-up appointments. · Recognize and accept your anxiety. Then, when you are in a situation that makes you anxious, say to yourself, \"This is not an emergency. I feel uncomfortable, but I am not in danger. I can keep going even if I feel anxious. \"  · Be kind to your body:  ? Relieve tension with exercise or a massage. ? Get enough rest.  ? Avoid alcohol, caffeine, nicotine, and illegal drugs. They can increase your anxiety level and cause sleep problems. ? Learn and do relaxation techniques. See below for more about these techniques. · Engage your mind. Get out and do something you enjoy. Go to a funny movie, or take a walk or hike. Plan your day. Having too much or too little to do can make you anxious. · Keep a record of your symptoms. Discuss your fears with a good friend or family member, or join a support group for people with similar problems. Talking to others sometimes relieves stress.   · Get involved in play a relaxation tape while you drive a car. When should you call for help? Call 911 anytime you think you may need emergency care. For example, call if:    · You feel you cannot stop from hurting yourself or someone else.   Jett Snowball the numbers for these national suicide hotlines: 3-842-455-TALK (5-747.495.4920) and 9-418-GQZQHVO (9-735.279.4648). If you or someone you know talks about suicide or feeling hopeless, get help right away.   Watch closely for changes in your health, and be sure to contact your doctor if:    · You have anxiety or fear that affects your life.     · You have symptoms of anxiety that are new or different from those you had before. Where can you learn more? Go to https://Sensus Experience[employer]Transinfo [employer].Nveloped. org and sign in to your Donews account. Enter P754 in the Tidy Books box to learn more about \"Anxiety Disorder: Care Instructions. \"     If you do not have an account, please click on the \"Sign Up Now\" link. Current as of: September 11, 2018  Content Version: 12.1  © 2325-4301 Healthwise, Incorporated. Care instructions adapted under license by [facility] ([facility]). If you have questions about a medical condition or this instruction, always ask your healthcare professional. [address] any warranty or liability for your use of this information.

## 2019-08-13 ENCOUNTER — TELEPHONE (OUTPATIENT)
Dept: FAMILY MEDICINE CLINIC | Age: 59
End: 2019-08-13

## 2019-08-13 DIAGNOSIS — I10 ESSENTIAL HYPERTENSION: ICD-10-CM

## 2019-08-13 DIAGNOSIS — Z12.5 PROSTATE CANCER SCREENING: ICD-10-CM

## 2019-08-13 DIAGNOSIS — Z20.2 POSSIBLE EXPOSURE TO STD: ICD-10-CM

## 2019-08-13 DIAGNOSIS — E78.5 HYPERLIPIDEMIA, UNSPECIFIED HYPERLIPIDEMIA TYPE: ICD-10-CM

## 2019-08-13 LAB
A/G RATIO: 2.1 (ref 1.1–2.2)
ALBUMIN SERPL-MCNC: 4.8 G/DL (ref 3.4–5)
ALP BLD-CCNC: 78 U/L (ref 40–129)
ALT SERPL-CCNC: 25 U/L (ref 10–40)
ANION GAP SERPL CALCULATED.3IONS-SCNC: 18 MMOL/L (ref 3–16)
AST SERPL-CCNC: 25 U/L (ref 15–37)
BASOPHILS ABSOLUTE: 0.1 K/UL (ref 0–0.2)
BASOPHILS RELATIVE PERCENT: 0.6 %
BILIRUB SERPL-MCNC: 0.7 MG/DL (ref 0–1)
BUN BLDV-MCNC: 13 MG/DL (ref 7–20)
CALCIUM SERPL-MCNC: 9.7 MG/DL (ref 8.3–10.6)
CHLORIDE BLD-SCNC: 104 MMOL/L (ref 99–110)
CHOLESTEROL, TOTAL: 146 MG/DL (ref 0–199)
CO2: 23 MMOL/L (ref 21–32)
CREAT SERPL-MCNC: 0.7 MG/DL (ref 0.9–1.3)
EOSINOPHILS ABSOLUTE: 0.2 K/UL (ref 0–0.6)
EOSINOPHILS RELATIVE PERCENT: 1.7 %
GFR AFRICAN AMERICAN: >60
GFR NON-AFRICAN AMERICAN: >60
GLOBULIN: 2.3 G/DL
GLUCOSE BLD-MCNC: 103 MG/DL (ref 70–99)
HCT VFR BLD CALC: 45.8 % (ref 40.5–52.5)
HDLC SERPL-MCNC: 61 MG/DL (ref 40–60)
HEMOGLOBIN: 15.5 G/DL (ref 13.5–17.5)
HEPATITIS C ANTIBODY INTERPRETATION: NORMAL
LDL CHOLESTEROL CALCULATED: 71 MG/DL
LYMPHOCYTES ABSOLUTE: 1.5 K/UL (ref 1–5.1)
LYMPHOCYTES RELATIVE PERCENT: 15.2 %
MCH RBC QN AUTO: 31.4 PG (ref 26–34)
MCHC RBC AUTO-ENTMCNC: 33.7 G/DL (ref 31–36)
MCV RBC AUTO: 93.2 FL (ref 80–100)
MONOCYTES ABSOLUTE: 0.6 K/UL (ref 0–1.3)
MONOCYTES RELATIVE PERCENT: 6.1 %
NEUTROPHILS ABSOLUTE: 7.7 K/UL (ref 1.7–7.7)
NEUTROPHILS RELATIVE PERCENT: 76.4 %
PDW BLD-RTO: 15.1 % (ref 12.4–15.4)
PLATELET # BLD: 235 K/UL (ref 135–450)
PMV BLD AUTO: 9 FL (ref 5–10.5)
POTASSIUM SERPL-SCNC: 4.7 MMOL/L (ref 3.5–5.1)
PROSTATE SPECIFIC ANTIGEN: 1.01 NG/ML (ref 0–4)
RBC # BLD: 4.92 M/UL (ref 4.2–5.9)
SODIUM BLD-SCNC: 145 MMOL/L (ref 136–145)
TOTAL PROTEIN: 7.1 G/DL (ref 6.4–8.2)
TRIGL SERPL-MCNC: 72 MG/DL (ref 0–150)
TSH REFLEX: 1.47 UIU/ML (ref 0.27–4.2)
VLDLC SERPL CALC-MCNC: 14 MG/DL
WBC # BLD: 10.1 K/UL (ref 4–11)

## 2019-08-13 NOTE — TELEPHONE ENCOUNTER
Patient is requesting a blood work order for C. Trachomatis N. Gonorrhoeae DNA. Patient feels he couldve been exposed to this since then. Patient is having blood work done this morning at . Please call patient once ordered and call lab to add order on to labs that have been drawn. Thank you!

## 2019-08-14 LAB
ESTIMATED AVERAGE GLUCOSE: 111.2 MG/DL
HBA1C MFR BLD: 5.5 %
HIV AG/AB: NORMAL
HIV ANTIGEN: NORMAL
HIV-1 ANTIBODY: NORMAL
HIV-2 AB: NORMAL

## 2019-09-07 DIAGNOSIS — F41.9 ANXIETY: Primary | ICD-10-CM

## 2019-09-09 RX ORDER — ALPRAZOLAM 1 MG/1
TABLET ORAL
Qty: 90 TABLET | Refills: 0 | Status: SHIPPED | OUTPATIENT
Start: 2019-09-09 | End: 2019-10-10 | Stop reason: SDUPTHER

## 2019-10-10 DIAGNOSIS — F41.9 ANXIETY: ICD-10-CM

## 2019-10-10 DIAGNOSIS — E78.5 HYPERLIPIDEMIA, UNSPECIFIED HYPERLIPIDEMIA TYPE: ICD-10-CM

## 2019-10-10 RX ORDER — ALPRAZOLAM 1 MG/1
TABLET ORAL
Qty: 90 TABLET | Refills: 0 | Status: SHIPPED | OUTPATIENT
Start: 2019-10-10 | End: 2019-11-08 | Stop reason: SDUPTHER

## 2019-10-10 RX ORDER — ATORVASTATIN CALCIUM 10 MG/1
TABLET, FILM COATED ORAL
Qty: 90 TABLET | Refills: 2 | Status: SHIPPED | OUTPATIENT
Start: 2019-10-10 | End: 2020-07-08

## 2019-10-29 ENCOUNTER — TELEPHONE (OUTPATIENT)
Dept: FAMILY MEDICINE CLINIC | Age: 59
End: 2019-10-29

## 2019-11-06 ENCOUNTER — TELEPHONE (OUTPATIENT)
Dept: FAMILY MEDICINE CLINIC | Age: 59
End: 2019-11-06

## 2019-11-06 ENCOUNTER — NURSE TRIAGE (OUTPATIENT)
Dept: OTHER | Facility: CLINIC | Age: 59
End: 2019-11-06

## 2019-11-08 DIAGNOSIS — F41.9 ANXIETY: ICD-10-CM

## 2019-11-08 RX ORDER — ALPRAZOLAM 1 MG/1
TABLET ORAL
Qty: 90 TABLET | Refills: 0 | Status: SHIPPED | OUTPATIENT
Start: 2019-11-08 | End: 2019-11-14 | Stop reason: SDUPTHER

## 2019-11-14 ENCOUNTER — OFFICE VISIT (OUTPATIENT)
Dept: FAMILY MEDICINE CLINIC | Age: 59
End: 2019-11-14
Payer: COMMERCIAL

## 2019-11-14 VITALS
OXYGEN SATURATION: 98 % | BODY MASS INDEX: 32.26 KG/M2 | WEIGHT: 224.8 LBS | HEART RATE: 81 BPM | DIASTOLIC BLOOD PRESSURE: 84 MMHG | SYSTOLIC BLOOD PRESSURE: 142 MMHG

## 2019-11-14 DIAGNOSIS — M54.42 CHRONIC LEFT-SIDED LOW BACK PAIN WITH LEFT-SIDED SCIATICA: ICD-10-CM

## 2019-11-14 DIAGNOSIS — Z72.0 TOBACCO ABUSE: ICD-10-CM

## 2019-11-14 DIAGNOSIS — I10 ESSENTIAL HYPERTENSION: ICD-10-CM

## 2019-11-14 DIAGNOSIS — F41.9 ANXIETY: ICD-10-CM

## 2019-11-14 DIAGNOSIS — F33.2 SEVERE EPISODE OF RECURRENT MAJOR DEPRESSIVE DISORDER, WITHOUT PSYCHOTIC FEATURES (HCC): Primary | ICD-10-CM

## 2019-11-14 DIAGNOSIS — G89.29 CHRONIC LEFT-SIDED LOW BACK PAIN WITH LEFT-SIDED SCIATICA: ICD-10-CM

## 2019-11-14 DIAGNOSIS — J43.9 PULMONARY EMPHYSEMA, UNSPECIFIED EMPHYSEMA TYPE (HCC): ICD-10-CM

## 2019-11-14 PROCEDURE — 99214 OFFICE O/P EST MOD 30 MIN: CPT | Performed by: FAMILY MEDICINE

## 2019-11-14 RX ORDER — OXYCODONE HYDROCHLORIDE AND ACETAMINOPHEN 5; 325 MG/1; MG/1
1 TABLET ORAL EVERY 6 HOURS PRN
Qty: 28 TABLET | Refills: 0 | Status: SHIPPED | OUTPATIENT
Start: 2019-11-14 | End: 2020-01-16 | Stop reason: SDUPTHER

## 2019-11-14 RX ORDER — ALPRAZOLAM 1 MG/1
TABLET ORAL
Qty: 90 TABLET | Refills: 0 | Status: SHIPPED | OUTPATIENT
Start: 2019-11-14 | End: 2019-12-14

## 2019-11-14 RX ORDER — VENLAFAXINE HYDROCHLORIDE 75 MG/1
75 CAPSULE, EXTENDED RELEASE ORAL DAILY
Qty: 90 CAPSULE | Refills: 1 | Status: SHIPPED | OUTPATIENT
Start: 2019-11-14 | End: 2020-01-27

## 2019-11-14 ASSESSMENT — ENCOUNTER SYMPTOMS
GASTROINTESTINAL NEGATIVE: 1
BACK PAIN: 1
RESPIRATORY NEGATIVE: 1

## 2019-11-15 ENCOUNTER — TELEPHONE (OUTPATIENT)
Dept: FAMILY MEDICINE CLINIC | Age: 59
End: 2019-11-15

## 2020-01-10 ENCOUNTER — TELEPHONE (OUTPATIENT)
Dept: ADMINISTRATIVE | Age: 60
End: 2020-01-10

## 2020-01-10 NOTE — TELEPHONE ENCOUNTER
A woman identified herself as Lakshmi Pruitt called in to get info on pt's appts, she is not listed in his contacts or paperwork as someone to release info to, I called office and transferred call to alpesh for assistance

## 2020-01-15 RX ORDER — ALPRAZOLAM 1 MG/1
TABLET ORAL
Qty: 90 TABLET | Refills: 0 | Status: SHIPPED | OUTPATIENT
Start: 2020-01-15 | End: 2020-02-14

## 2020-01-15 NOTE — TELEPHONE ENCOUNTER
Medication:   Requested Prescriptions     Pending Prescriptions Disp Refills    ALPRAZolam (XANAX) 1 MG tablet [Pharmacy Med Name: ALPRAZolam Oral Tablet 1 MG] 90 tablet 0     Sig: TAKE 1 TABLET BY MOUTH THREE TIMES A DAY        Last Filled:  11/14/2019 #90 0rf     Patient Phone Number: [phone] (home)     Last appt: 11/14/2019   Next appt: 3/6/2020    Last OARRS:   RX Monitoring 11/14/2019   Attestation -   Periodic Controlled Substance Monitoring No signs of potential drug abuse or diversion identified.

## 2020-01-16 ENCOUNTER — OFFICE VISIT (OUTPATIENT)
Dept: FAMILY MEDICINE CLINIC | Age: 60
End: 2020-01-16
Payer: MEDICARE

## 2020-01-16 VITALS
HEART RATE: 83 BPM | BODY MASS INDEX: 30.99 KG/M2 | SYSTOLIC BLOOD PRESSURE: 134 MMHG | WEIGHT: 216 LBS | DIASTOLIC BLOOD PRESSURE: 84 MMHG | OXYGEN SATURATION: 97 %

## 2020-01-16 PROCEDURE — 4004F PT TOBACCO SCREEN RCVD TLK: CPT | Performed by: FAMILY MEDICINE

## 2020-01-16 PROCEDURE — 99214 OFFICE O/P EST MOD 30 MIN: CPT | Performed by: FAMILY MEDICINE

## 2020-01-16 PROCEDURE — G8417 CALC BMI ABV UP PARAM F/U: HCPCS | Performed by: FAMILY MEDICINE

## 2020-01-16 PROCEDURE — 3017F COLORECTAL CA SCREEN DOC REV: CPT | Performed by: FAMILY MEDICINE

## 2020-01-16 PROCEDURE — G8427 DOCREV CUR MEDS BY ELIG CLIN: HCPCS | Performed by: FAMILY MEDICINE

## 2020-01-16 PROCEDURE — 3023F SPIROM DOC REV: CPT | Performed by: FAMILY MEDICINE

## 2020-01-16 PROCEDURE — G8484 FLU IMMUNIZE NO ADMIN: HCPCS | Performed by: FAMILY MEDICINE

## 2020-01-16 PROCEDURE — G8926 SPIRO NO PERF OR DOC: HCPCS | Performed by: FAMILY MEDICINE

## 2020-01-16 RX ORDER — OXYCODONE HYDROCHLORIDE AND ACETAMINOPHEN 5; 325 MG/1; MG/1
1 TABLET ORAL EVERY 6 HOURS PRN
Qty: 28 TABLET | Refills: 0 | Status: SHIPPED | OUTPATIENT
Start: 2020-01-16 | End: 2020-04-17 | Stop reason: SDUPTHER

## 2020-01-16 ASSESSMENT — ENCOUNTER SYMPTOMS
SHORTNESS OF BREATH: 1
BACK PAIN: 1

## 2020-01-16 NOTE — PROGRESS NOTES
Exam  Constitutional:       General: He is not in acute distress. HENT:      Head:      Comments: Some erythema to both eardrums appears to be more barotrauma  Neck:      Musculoskeletal: Neck supple. Thyroid: No thyromegaly. Vascular: No carotid bruit. Cardiovascular:      Rate and Rhythm: Normal rate and regular rhythm. Pulmonary:      Effort: Pulmonary effort is normal.      Breath sounds: Normal breath sounds. No wheezing or rales. Abdominal:      General: There is no distension. Palpations: Abdomen is soft. Tenderness: There is no tenderness. There is no guarding or rebound. Lymphadenopathy:      Cervical: No cervical adenopathy. Skin:     General: Skin is warm and dry. Neurological:      Mental Status: He is alert and oriented to person, place, and time. Psychiatric:         Behavior: Behavior normal.         Thought Content: Thought content normal.         Judgment: Judgment normal.         Assessment/Plan:    Jani Bae was seen today for swelling, diarrhea, results and back pain. Diagnoses and all orders for this visit:    Essential hypertension  Reasonably well controlled  Continue current treatment   Return 3 months   Tobacco abuse  Smoking cessation discussed with the patient     Pulmonary emphysema, unspecified emphysema type ([address]  Generally stable. Continue current treatment with inhalers  Chronic left-sided low back pain with left-sided sciatica  -     oxyCODONE-acetaminophen (PERCOCET) 5-325 MG per tablet; Take 1 tablet by mouth every 6 hours as needed for Pain for up to 7 days. Intended supply: 7 days. Take lowest dose possible to manage pain  OARRS report reviewed and no inconsistencies noted   Recurrent major depressive disorder, in partial remission ([address]  He remains on Effexor. He states he is depressed about life circumstances. Leukocytosis, unspecified type  -     CBC Auto Differential; Future  Clogged ear, left  Symptomatic treatment. Nasal steroid.

## 2020-01-20 DIAGNOSIS — D72.829 LEUKOCYTOSIS, UNSPECIFIED TYPE: ICD-10-CM

## 2020-01-20 LAB
BASOPHILS ABSOLUTE: 0.1 K/UL (ref 0–0.2)
BASOPHILS RELATIVE PERCENT: 0.7 %
EOSINOPHILS ABSOLUTE: 0.2 K/UL (ref 0–0.6)
EOSINOPHILS RELATIVE PERCENT: 1.8 %
HCT VFR BLD CALC: 44.3 % (ref 40.5–52.5)
HEMOGLOBIN: 14.9 G/DL (ref 13.5–17.5)
LYMPHOCYTES ABSOLUTE: 1.8 K/UL (ref 1–5.1)
LYMPHOCYTES RELATIVE PERCENT: 20.9 %
MCH RBC QN AUTO: 30.9 PG (ref 26–34)
MCHC RBC AUTO-ENTMCNC: 33.6 G/DL (ref 31–36)
MCV RBC AUTO: 92 FL (ref 80–100)
MONOCYTES ABSOLUTE: 0.6 K/UL (ref 0–1.3)
MONOCYTES RELATIVE PERCENT: 7.5 %
NEUTROPHILS ABSOLUTE: 5.9 K/UL (ref 1.7–7.7)
NEUTROPHILS RELATIVE PERCENT: 69.1 %
PDW BLD-RTO: 14 % (ref 12.4–15.4)
PLATELET # BLD: 245 K/UL (ref 135–450)
PMV BLD AUTO: 8.9 FL (ref 5–10.5)
RBC # BLD: 4.82 M/UL (ref 4.2–5.9)
WBC # BLD: 8.5 K/UL (ref 4–11)

## 2020-01-28 ENCOUNTER — TELEPHONE (OUTPATIENT)
Dept: FAMILY MEDICINE CLINIC | Age: 60
End: 2020-01-28

## 2020-01-28 RX ORDER — BUPROPION HYDROCHLORIDE 150 MG/1
150 TABLET ORAL EVERY MORNING
Qty: 30 TABLET | Refills: 1 | Status: SHIPPED | OUTPATIENT
Start: 2020-01-28 | End: 2020-03-24

## 2020-01-28 NOTE — TELEPHONE ENCOUNTER
He could try. He probably should wean off the Effexor though and not stop it all at once. I will send Wellbutrin to the pharmacy.

## 2020-01-28 NOTE — TELEPHONE ENCOUNTER
Pt notified. Trip Dowd He said he will try the welbutrin again. He wants to get off the efferxor so he does not gain weight. He wants to know if he can just take the welbutrin.

## 2020-01-28 NOTE — TELEPHONE ENCOUNTER
Pt called, states you have him on Effexor, he did labs, all was fine . He forgot to ask if you can possibly switch his depression med to chantix instead of Effexor. He thinks it will benefit him to stop smoking along with his depression and copd. If you do prescribe chantix, please make sure the diagnosis needs to be prescribed for \"depression\" for his workers comp to pay for it. He doesn't want to take two different med's, asking if you will switch them.

## 2020-02-14 RX ORDER — ALPRAZOLAM 1 MG/1
TABLET ORAL
Qty: 90 TABLET | Refills: 0 | Status: SHIPPED | OUTPATIENT
Start: 2020-02-14 | End: 2020-03-13

## 2020-03-13 RX ORDER — ALPRAZOLAM 1 MG/1
TABLET ORAL
Qty: 90 TABLET | Refills: 0 | Status: SHIPPED | OUTPATIENT
Start: 2020-03-13 | End: 2020-04-10

## 2020-04-17 ENCOUNTER — VIRTUAL VISIT (OUTPATIENT)
Dept: FAMILY MEDICINE CLINIC | Age: 60
End: 2020-04-17
Payer: MEDICARE

## 2020-04-17 PROCEDURE — 3017F COLORECTAL CA SCREEN DOC REV: CPT | Performed by: FAMILY MEDICINE

## 2020-04-17 PROCEDURE — 99213 OFFICE O/P EST LOW 20 MIN: CPT | Performed by: FAMILY MEDICINE

## 2020-04-17 PROCEDURE — G8428 CUR MEDS NOT DOCUMENT: HCPCS | Performed by: FAMILY MEDICINE

## 2020-04-17 RX ORDER — OXYCODONE HYDROCHLORIDE AND ACETAMINOPHEN 5; 325 MG/1; MG/1
1 TABLET ORAL EVERY 6 HOURS PRN
Qty: 28 TABLET | Refills: 0 | Status: SHIPPED | OUTPATIENT
Start: 2020-04-17 | End: 2020-05-12

## 2020-04-17 ASSESSMENT — ENCOUNTER SYMPTOMS
RESPIRATORY NEGATIVE: 1
GASTROINTESTINAL NEGATIVE: 1
BACK PAIN: 1

## 2020-04-17 NOTE — PROGRESS NOTES
understands the risks of dependency/addiction with oxycodone and will take as little as possible and discontinue as soon as possible  - oxyCODONE-acetaminophen (PERCOCET) 5-325 MG per tablet; Take 1 tablet by mouth every 6 hours as needed for Pain for up to 7 days. Intended supply: 7 days. Take lowest dose possible to manage pain  Dispense: 28 tablet; Refill: 0    3. Tobacco abuse  Smoking cessation discussed with the patient again. Continue Wellbutrin    4. Essential hypertension  Home blood pressure checks. Continue current medication. Return 3 months      Return in about 3 months (around 7/17/2020) for OARRS, htn. Sparkle Tavares is a 64 y.o. male being evaluated by a Virtual Visit (video visit) encounter to address concerns as mentioned above. A caregiver was present when appropriate. Due to this being a TeleHealth encounter (During KSGP-44 public health emergency), evaluation of the following organ systems was limited: Vitals/Constitutional/EENT/Resp/CV/GI//MS/Neuro/Skin/Heme-Lymph-Imm. Pursuant to the emergency declaration under the [facility], [address] [place] [address] authority and the [employer] and [employer] Act, this Virtual Visit was conducted with patient's (and/or legal guardian's) consent, to reduce the patient's risk of exposure to COVID-19 and provide necessary medical care. The patient (and/or legal guardian) has also been advised to contact this office for worsening conditions or problems, and seek emergency medical treatment and/or call 911 if deemed necessary. Services were provided through a video synchronous discussion virtually to substitute for in-person clinic visit. Patient and provider were located at their individual homes. --Nadine Khan MD on 4/17/2020 at 5:48 PM    An electronic signature was used to authenticate this note.

## 2020-05-04 RX ORDER — AMLODIPINE BESYLATE 5 MG/1
TABLET ORAL
Qty: 90 TABLET | Refills: 3 | Status: SHIPPED | OUTPATIENT
Start: 2020-05-04 | End: 2020-05-05 | Stop reason: SDUPTHER

## 2020-05-04 NOTE — TELEPHONE ENCOUNTER
Medication:   Requested Prescriptions     Pending Prescriptions Disp Refills    amLODIPine (NORVASC) 5 MG tablet [Pharmacy Med Name: amLODIPine Besylate Oral Tablet 5 MG] 90 tablet 0     Sig: TAKE 1 TABLET BY MOUTH ONE TIME A DAY       Last Filled:  07/31/2019 #90 2rf    Patient Phone Number: [phone] (home)     Last appt: 04/17/2020   Next appt: Visit date not found    Lab Results   Component Value Date     08/13/2019    K 4.7 08/13/2019     08/13/2019    CO2 23 08/13/2019    BUN 13 08/13/2019    CREATININE 0.7 (L) 08/13/2019    GLUCOSE 103 (H) 08/13/2019    CALCIUM 9.7 08/13/2019    PROT 7.1 08/13/2019    LABALBU 4.8 08/13/2019    BILITOT 0.7 08/13/2019    ALKPHOS 78 08/13/2019    AST 25 08/13/2019    ALT 25 08/13/2019    LABGLOM >60 08/13/2019    GFRAA >60 08/13/2019    AGRATIO 2.1 08/13/2019    GLOB 2.3 08/13/2019

## 2020-05-05 RX ORDER — AMLODIPINE BESYLATE 5 MG/1
TABLET ORAL
Qty: 90 TABLET | Refills: 1 | Status: SHIPPED | OUTPATIENT
Start: 2020-05-05 | End: 2020-08-24 | Stop reason: SDUPTHER

## 2020-05-08 RX ORDER — ALPRAZOLAM 1 MG/1
TABLET ORAL
Qty: 90 TABLET | Refills: 0 | Status: SHIPPED | OUTPATIENT
Start: 2020-05-08 | End: 2020-06-05

## 2020-05-08 RX ORDER — UMECLIDINIUM BROMIDE AND VILANTEROL TRIFENATATE 62.5; 25 UG/1; UG/1
POWDER RESPIRATORY (INHALATION)
Qty: 1 EACH | Refills: 3 | Status: SHIPPED | OUTPATIENT
Start: 2020-05-08 | End: 2020-08-24 | Stop reason: SDUPTHER

## 2020-05-12 RX ORDER — OXYCODONE HYDROCHLORIDE AND ACETAMINOPHEN 5; 325 MG/1; MG/1
TABLET ORAL
Qty: 30 TABLET | Refills: 0 | Status: SHIPPED | OUTPATIENT
Start: 2020-05-12 | End: 2020-06-05

## 2020-06-01 ENCOUNTER — TELEPHONE (OUTPATIENT)
Dept: PRIMARY CARE CLINIC | Age: 60
End: 2020-06-01

## 2020-06-01 ENCOUNTER — OFFICE VISIT (OUTPATIENT)
Dept: PRIMARY CARE CLINIC | Age: 60
End: 2020-06-01
Payer: MEDICARE

## 2020-06-01 VITALS — HEART RATE: 91 BPM | OXYGEN SATURATION: 95 % | TEMPERATURE: 98.8 F

## 2020-06-01 PROCEDURE — 99213 OFFICE O/P EST LOW 20 MIN: CPT | Performed by: NURSE PRACTITIONER

## 2020-06-01 PROCEDURE — 3017F COLORECTAL CA SCREEN DOC REV: CPT | Performed by: NURSE PRACTITIONER

## 2020-06-01 PROCEDURE — G8428 CUR MEDS NOT DOCUMENT: HCPCS | Performed by: NURSE PRACTITIONER

## 2020-06-01 PROCEDURE — G8417 CALC BMI ABV UP PARAM F/U: HCPCS | Performed by: NURSE PRACTITIONER

## 2020-06-01 PROCEDURE — 4004F PT TOBACCO SCREEN RCVD TLK: CPT | Performed by: NURSE PRACTITIONER

## 2020-06-01 NOTE — PROGRESS NOTES
2020  Cordelia Martínez (:  1960)    PCP_______________________    EMPLOYER / Occupation_________________           DO YOU NEED A WORK NOTE: [] Yes  [] No   Pharmacy:_________________    FLU/COVID-19 CLINIC EVALUATION    [] ASYMPTOMATIC  [] RETEST  [] EXPOSURE TO KNOWN POSITIVE  HPI:  Bibiana Raygoza is a 59yr male that presents for 1 wkhx of mildly worsening SOB from baseline. Doesn't have baseline with regular activity but reports he felt SOB when moving around the garden hose last wk. Hx baseline SOB and cough d/t COPD-cough not worse. Also reports sore throat, itchy watery eyes, sneezing, headache. Recent travel to [place] via car. Does not have albuterol inhaler at home.      SYMPTOMS:  Primary Language: [] English   [] Bahraini  [] Bulgarian  [] Other___________________  Allergies:_________    SOCIAL HISTORY:  Number of people living in patient's home (counting the patient as 1):  [] 1   [] 2   [] 3   [] 4   [] 5   [] >6    Symptom duration, days:  [] 1   [] 2   [] 3   [x] 4 - 7   [] 8 - 10   [] 11 - 13   [] >14    [x] Fevers    [x] Symptom (not measured)  [] Measured (Result:  degrees)  [] Chills  [] Cough [] Dry [] Productive   []Loss of Taste  [] Loss of Smell  []Decreased Appetite  [] Coughing up blood  }  [] Chest Congestion  [] Nasal Congestion  [] Runny  Nose  [x] Sneezing  [x] Feeling short of breath, more than normal     [] Chest pain     [] Headaches  []Tolerable  [] Severe     [] Fatigue  [] Sore throat  [] Muscle aches  [] Nausea  [] Vomiting  []Unable to keep fluids down     [] Diarrhea  []Severe       [x] OTHER SYMPTOMS: bilateral eyes itching    Symptom course:   [] Worsening     [x] Stable     [] Improving    RISK FACTORS:1}  [] Pregnant or possibly pregnant  [] Age over 61  [] Diabetes  [x] HTN  [] Heart disease  [] Asthma  [x] COPD/  [] Active Cancer  [] On Chemotherapy  [] Taking oral steroids  [] History Lymphoma/Leukemia  [] Autoimmune Disorder  [] Close contact with a lab confirmed COVID-19

## 2020-06-01 NOTE — TELEPHONE ENCOUNTER
Your patient was seen at the [facility] today. A follow up virtual visit with the patient's PCP should be completed in 48 hours. Please schedule the patient for this follow-up. Thank you.

## 2020-06-02 ENCOUNTER — TELEPHONE (OUTPATIENT)
Dept: FAMILY MEDICINE CLINIC | Age: 60
End: 2020-06-02

## 2020-06-02 NOTE — TELEPHONE ENCOUNTER
This patient has a VV on Thursday, was seen at [facility] and had covid test, results not back yet. Will you do a VV on Thursday while you are in the office?

## 2020-06-03 ENCOUNTER — TELEPHONE (OUTPATIENT)
Dept: ADMINISTRATIVE | Age: 60
End: 2020-06-03

## 2020-06-03 NOTE — TELEPHONE ENCOUNTER
[facility] received a call from:    Name of Caller: Pt    Relationship to patient: Self    Organization name: NA    Best contact number: [phone]     Reason for call: Pt called in to obtain COVID test results.  Please advise

## 2020-06-04 LAB
SARS-COV-2: NOT DETECTED
SOURCE: NORMAL

## 2020-06-05 RX ORDER — ALPRAZOLAM 1 MG/1
TABLET ORAL
Qty: 90 TABLET | Refills: 0 | Status: SHIPPED | OUTPATIENT
Start: 2020-06-05 | End: 2020-07-14 | Stop reason: SDUPTHER

## 2020-06-05 RX ORDER — OXYCODONE HYDROCHLORIDE AND ACETAMINOPHEN 5; 325 MG/1; MG/1
TABLET ORAL
Qty: 30 TABLET | Refills: 0 | Status: SHIPPED | OUTPATIENT
Start: 2020-06-05 | End: 2020-06-22 | Stop reason: SDUPTHER

## 2020-06-05 NOTE — TELEPHONE ENCOUNTER
Patient requesting a medication refill. Medication: oxyCODONE-acetaminophen (PERCOCET) 5-325 MG per tablet      ALPRAZolam Jobie Shires) 1 MG tablet     Pharmacy: [address] [facility] [phone] - F [phone]    Last office visit: 4/17/20    Next office visit: Visit date not found    Back pain persistent due to sleeping on couch. Not getting much sleep.

## 2020-06-09 ENCOUNTER — CARE COORDINATION (OUTPATIENT)
Dept: CARE COORDINATION | Age: 60
End: 2020-06-09

## 2020-06-09 NOTE — CARE COORDINATION
This RN responded to a anxiety is concerning yellow alert on the loop. Carlotta Mace, RN, 1:14 PM   Thank you for checking in on the loop Anthony Herrera. Please let us know if you have any new or worsening symptoms. If you would like to speak to a nurse today we are available until 4 pm. I can be reached at [phone], Carlotta Mace RN       Anthony Steenis reports back that he didn't mean to send the alert. He states his wife has tested positive for covid 19 and he is going to get her signed up on the loop.

## 2020-06-11 ENCOUNTER — CARE COORDINATION (OUTPATIENT)
Dept: CARE COORDINATION | Age: 60
End: 2020-06-11

## 2020-06-15 ENCOUNTER — CARE COORDINATION (OUTPATIENT)
Dept: FAMILY MEDICINE CLINIC | Age: 60
End: 2020-06-15

## 2020-06-17 ENCOUNTER — CARE COORDINATION (OUTPATIENT)
Dept: CASE MANAGEMENT | Age: 60
End: 2020-06-17

## 2020-06-17 NOTE — CARE COORDINATION
Date/Time:  6/17/2020 9:40 AM  LPN attempted to reach patient by telephone regarding red alert in Loop remote symptom monitoring program. Left HIPPA compliant message requesting a return call. Will attempt to reach patient again. Comment left in Loop monitoring program with contact information. Rosalind Munoz LPN, 5:59 AM 0/09/3002  Sent to Patient  Hi, I'm Eleno Joiner LPN from the [address] [place], [place] [address] [facility] Care Team. I see you triggered an Alert earlier. Thank you for checking in with us. Please let us know if your symptoms are worse than yesterday or if you wish to speak to a nurse. You can also E-mail your message to us. We are available between 8am to 4 pm Mon-Fri. and 9am to 1pm weekends. Waiting for Patient Reply. Rosalind Munoz, 3:58 PM  Patient did not respond to Loop E-mail or Telephone Call.     Rosalind Munoz LPN     [address] [facility] / [facility]

## 2020-06-21 ENCOUNTER — NURSE TRIAGE (OUTPATIENT)
Dept: OTHER | Facility: CLINIC | Age: 60
End: 2020-06-21

## 2020-06-22 RX ORDER — OXYCODONE HYDROCHLORIDE AND ACETAMINOPHEN 5; 325 MG/1; MG/1
1 TABLET ORAL EVERY 6 HOURS PRN
Qty: 30 TABLET | Refills: 0 | Status: SHIPPED | OUTPATIENT
Start: 2020-06-22 | End: 2020-07-07 | Stop reason: SDUPTHER

## 2020-06-22 NOTE — TELEPHONE ENCOUNTER
Medication and Quantity requested: oxyCODONE-acetaminophen (PERCOCET) 5-325 MG per tablet         Last Visit  4/17/2020-VV    Pharmacy and phone number updated in [facility]:  Yes  [address] [facility]. Fax: [phone]

## 2020-06-26 ENCOUNTER — OFFICE VISIT (OUTPATIENT)
Dept: PRIMARY CARE CLINIC | Age: 60
End: 2020-06-26
Payer: MEDICARE

## 2020-06-26 VITALS — OXYGEN SATURATION: 97 % | HEART RATE: 94 BPM | TEMPERATURE: 98.9 F

## 2020-06-26 PROCEDURE — G8417 CALC BMI ABV UP PARAM F/U: HCPCS | Performed by: FAMILY MEDICINE

## 2020-06-26 PROCEDURE — G8428 CUR MEDS NOT DOCUMENT: HCPCS | Performed by: FAMILY MEDICINE

## 2020-06-26 PROCEDURE — 3017F COLORECTAL CA SCREEN DOC REV: CPT | Performed by: FAMILY MEDICINE

## 2020-06-26 PROCEDURE — 99213 OFFICE O/P EST LOW 20 MIN: CPT | Performed by: FAMILY MEDICINE

## 2020-06-26 PROCEDURE — 4004F PT TOBACCO SCREEN RCVD TLK: CPT | Performed by: FAMILY MEDICINE

## 2020-06-26 NOTE — PATIENT INSTRUCTIONS
Advance Care Planning  People with COVID-19 may have no symptoms, mild symptoms, such as fever, cough, and shortness of breath or they may have more severe illness, developing severe and fatal pneumonia. As a result, Advance Care Planning with attention to naming a health care decision maker (someone you trust to make healthcare decisions for you if you could not speak for yourself) and sharing other health care preferences is important BEFORE a possible health crisis. Please contact your Primary Care Provider to discuss Advance Care Planning. Preventing the Spread of Coronavirus Disease 2019 in Homes and Residential Communities  For the most recent information go to [email or web address]    Prevention steps for People with confirmed or suspected COVID-19 (including persons under investigation) who do not need to be hospitalized  and   People with confirmed COVID-19 who were hospitalized and determined to be medically stable to go home    Your healthcare provider and public health staff will evaluate whether you can be cared for at home. If it is determined that you do not need to be hospitalized and can be isolated at home, you will be monitored by staff from your local or state health department. You should follow the prevention steps below until a healthcare provider or local or state health department says you can return to your normal activities. Stay home except to get medical care  People who are mildly ill with COVID-19 are able to isolate at home during their illness. You should restrict activities outside your home, except for getting medical care. Do not go to work, school, or public areas. Avoid using public transportation, ride-sharing, or taxis. Separate yourself from other people and animals in your home  People: As much as possible, you should stay in a specific room and away from other people in your home.  Also, you should use a separate before eating or preparing food. If soap and water are not readily available, use an alcohol-based hand  with at least 60% alcohol, covering all surfaces of your hands and rubbing them together until they feel dry. Soap and water are the best option if hands are visibly dirty. Avoid touching your eyes, nose, and mouth with unwashed hands. Avoid sharing personal household items  You should not share dishes, drinking glasses, cups, eating utensils, towels, or bedding with other people or pets in your home. After using these items, they should be washed thoroughly with soap and water. Clean all high-touch surfaces everyday  High touch surfaces include counters, tabletops, doorknobs, bathroom fixtures, toilets, phones, keyboards, tablets, and bedside tables. Also, clean any surfaces that may have blood, stool, or body fluids on them. Use a household cleaning spray or wipe, according to the label instructions. Labels contain instructions for safe and effective use of the cleaning product including precautions you should take when applying the product, such as wearing gloves and making sure you have good ventilation during use of the product. Monitor your symptoms  Seek prompt medical attention if your illness is worsening (e.g., difficulty breathing). Before seeking care, call your healthcare provider and tell them that you have, or are being evaluated for, COVID-19. Put on a facemask before you enter the facility. These steps will help the healthcare providers office to keep other people in the office or waiting room from getting infected or exposed. Ask your healthcare provider to call the local or state health department. Persons who are placed under active monitoring or facilitated self-monitoring should follow instructions provided by their local health department or occupational health professionals, as appropriate. When working with your local health department check their available hours.   If you have a medical emergency and need to call 911, notify the dispatch personnel that you have, or are being evaluated for COVID-19. If possible, put on a facemask before emergency medical services arrive. Discontinuing home isolation  Patients with confirmed COVID-19 should remain under home isolation precautions until the risk of secondary transmission to others is thought to be low. The decision to discontinue home isolation precautions should be made on a case-by-case basis, in consultation with healthcare providers and state and local health departments. Thank you for enrolling in [address] Please follow the instructions below to securely access your online medical record. [employer] allows you to send messages to your doctor, view your test results, renew your prescriptions, schedule appointments, and more. How Do I Sign Up? 1. In your Internet browser, go to [email or web address]  2. Click on the Sign Up Now link in the Sign In box. You will see the New Member Sign Up page. 3. Enter your [employer] Access Code exactly as it appears below. You will not need to use this code after youve completed the sign-up process. If you do not sign up before the expiration date, you must request a new code. [employer] Access Code: Activation code not generated  Current [employer] Status: Active    4. Enter your Social Security Number ([id number]) and Date of Birth (mm/dd/yyyy) as indicated and click Submit. You will be taken to the next sign-up page. 5. Create a [employer] ID. This will be your [employer] login ID and cannot be changed, so think of one that is secure and easy to remember. 6. Create a [employer] password. You can change your password at any time. 7. Enter your Password Reset Question and Answer. This can be used at a later time if you forget your password. 8. Enter your e-mail address. You will receive e-mail notification when new information is available in [address] 9. Click Sign Up. You can now view your medical record. Additional Information  If you have questions, please contact your physician practice where you receive care. Remember, [facility] is NOT to be used for urgent needs. For medical emergencies, dial 911.

## 2020-06-29 LAB
SARS-COV-2: NOT DETECTED
SOURCE: NORMAL

## 2020-07-07 RX ORDER — OXYCODONE HYDROCHLORIDE AND ACETAMINOPHEN 5; 325 MG/1; MG/1
1 TABLET ORAL EVERY 6 HOURS PRN
Qty: 30 TABLET | Refills: 0 | Status: SHIPPED | OUTPATIENT
Start: 2020-07-07 | End: 2020-07-17 | Stop reason: SDUPTHER

## 2020-07-07 NOTE — TELEPHONE ENCOUNTER
Medication and Quantity requested:    oxyCODONE-acetaminophen (PERCOCET) 5-325 MG per tablet      Last Visit  04/17/20 - Dr Estrellita Nash and phone number updated in EPIC:  Yes    Ph[facility]:  [facility]

## 2020-07-08 RX ORDER — ATORVASTATIN CALCIUM 10 MG/1
TABLET, FILM COATED ORAL
Qty: 90 TABLET | Refills: 0 | Status: SHIPPED | OUTPATIENT
Start: 2020-07-08 | End: 2020-08-24 | Stop reason: SDUPTHER

## 2020-07-13 ENCOUNTER — VIRTUAL VISIT (OUTPATIENT)
Dept: FAMILY MEDICINE CLINIC | Age: 60
End: 2020-07-13

## 2020-07-13 ASSESSMENT — PATIENT HEALTH QUESTIONNAIRE - PHQ9
SUM OF ALL RESPONSES TO PHQ QUESTIONS 1-9: 1
SUM OF ALL RESPONSES TO PHQ QUESTIONS 1-9: 1
SUM OF ALL RESPONSES TO PHQ9 QUESTIONS 1 & 2: 1
1. LITTLE INTEREST OR PLEASURE IN DOING THINGS: 0
2. FEELING DOWN, DEPRESSED OR HOPELESS: 1

## 2020-07-14 ENCOUNTER — VIRTUAL VISIT (OUTPATIENT)
Dept: FAMILY MEDICINE CLINIC | Age: 60
End: 2020-07-14
Payer: COMMERCIAL

## 2020-07-14 PROCEDURE — 99214 OFFICE O/P EST MOD 30 MIN: CPT | Performed by: FAMILY MEDICINE

## 2020-07-14 RX ORDER — ALPRAZOLAM 1 MG/1
TABLET ORAL
Qty: 90 TABLET | Refills: 0 | Status: SHIPPED | OUTPATIENT
Start: 2020-07-14 | End: 2020-08-12

## 2020-07-14 ASSESSMENT — ENCOUNTER SYMPTOMS
RESPIRATORY NEGATIVE: 1
BACK PAIN: 1
GASTROINTESTINAL NEGATIVE: 1

## 2020-07-14 NOTE — PROGRESS NOTES
2020    TELEHEALTH EVALUATION -- Audio/Visual (During SRRUE-83 public health emergency)    HPI:    Martín Carlton (:  1960) has requested an audio/video evaluation for the following concern(s):    Patient is tele-visit is for routine medication management. He is on medication for anxiety which he states has actually increased since he received custody of his 77-year-old daughter. He states she continues to cause him significant worry and issues due to her attitude and behavior. He did state while his wife had COVID(see below) he did remove her from the house to stay with someone else He continues to take the alprazolam in the amount prescribed. He states his back and shoulder has been hurting worse since he slept on the sofa due to his wife's COVID(see below). He states he takes anywhere from 1 to 3 tablets/day. He states his wife had Covid and was just released to go back to work yesterday. Her positive test was  and negative test was . He states he has had no symptoms and has had 2 negative tests on  and . He states his depression has been generally okay and is no longer taking any medication except Wellbutrin for tobacco cessation  He is taking Wellbutrin is not really helping him to stop smoking and he would like to discontinue this. Renatoreed Cogan He is not checking his blood pressure but remains on the medication. Review of Systems   Constitutional: Negative. HENT: Negative. Respiratory: Negative. Uses inhaler   Cardiovascular: Negative. Gastrointestinal: Negative. Endocrine: Negative. Genitourinary: Negative. Musculoskeletal: Positive for arthralgias and back pain. Skin: Negative. Neurological: Negative. Psychiatric/Behavioral: Positive for dysphoric mood and sleep disturbance. The patient is nervous/anxious. Prior to Visit Medications    Medication Sig Taking?  Authorizing Provider   ALPRAZolam (XANAX) 1 MG tablet TAKE 1 TABLET BY MOUTH THREE TIMES A DAY Yes Jessica Mckeon MD   atorvastatin (LIPITOR) 10 MG tablet TAKE 1 TABLET BY MOUTH ONE TIME A DAY  Yes Manuelito Arnold MD   oxyCODONE-acetaminophen (PERCOCET) 5-325 MG per tablet Take 1 tablet by mouth every 6 hours as needed for Pain for up to 92 days. Yes Jessica Mckeon MD   ANORO ELLIPTA 62.5-25 MCG/INH AEPB inhaler INHALE 1 PUFF BY MOUTH ONE TIME A DAY  Yes Jessica Mckeon MD   amLODIPine (NORVASC) 5 MG tablet TAKE 1 TABLET BY MOUTH ONE TIME A DAY Yes Jessica Mckeon MD   buPROPion (WELLBUTRIN XL) 150 MG extended release tablet TAKE 1 TABLET BY MOUTH IN THE MORNING  Yes Jessica Mckeon MD       Social History     Tobacco Use    Smoking status: Current Every Day Smoker     Packs/day: 1.00     Years: 10.00     Pack years: 10.00     Types: Cigarettes    Smokeless tobacco: Never Used    Tobacco comment: pt has not smoked since 5/5/2019   Substance Use Topics    Alcohol use: Yes     Alcohol/week: 1.0 - 2.0 standard drinks     Types: 1 - 2 Shots of liquor per week     Comment: former heavy use; recovering alcoholic since 7073, approx    Drug use: No        Allergies   Allergen Reactions    Nucynta [Tapentadol] Anaphylaxis     Hallucinations.  Went to hospital.    Codeine Rash     itching    Tylenol [Acetaminophen] Rash     itching   ,   Past Medical History:   Diagnosis Date    Alcoholism ([address] 07/14/1999    Anxiety     Back pain     Hyperlipidemia     Low HDL (under 40)     Prolonged emergence from general anesthesia     \"hard time breathing when waking up'    Spinal arthritis 2005   ,   Past Surgical History:   Procedure Laterality Date    COLONOSCOPY  10/18/12    SPINAL FUSION  10/2006    SPINE SURGERY  2008    l5-s1 hardware removal    TYMPANOSTOMY TUBE PLACEMENT Bilateral 04/09/2018    LEFT PRESSURE EQUALIZATION TUBE INSERTION AND EXAM UNDER ANESTHESIA OF NASOPHARYNX    VASECTOMY  01/11/2019   ,   Social History     Tobacco Use    Smoking status: Current Every Day Smoker Packs/day: 1.00     Years: 10.00     Pack years: 10.00     Types: Cigarettes    Smokeless tobacco: Never Used    Tobacco comment: pt has not smoked since 5/5/2019   Substance Use Topics    Alcohol use: Yes     Alcohol/week: 1.0 - 2.0 standard drinks     Types: 1 - 2 Shots of liquor per week     Comment: former heavy use; recovering alcoholic since 2984, approx    Drug use: No   ,   Family History   Problem Relation Age of Onset    Heart Disease Mother     High Blood Pressure Mother     Cancer Mother         female   ,   There is no immunization history on file for this patient.,   Health Maintenance   Topic Date Due    Pneumococcal 0-64 years Vaccine (1 of 1 - PPSV23) 10/09/1968    DTaP/Tdap/Td vaccine (1 - Tdap) 10/09/1981    Shingles Vaccine (1 of 2) 10/09/2012    Lipid screen  08/13/2020    Potassium monitoring  08/13/2020    Creatinine monitoring  08/13/2020    Flu vaccine (1) 09/01/2020    Colon cancer screen colonoscopy  10/18/2022    HIV screen  Completed    Hepatitis A vaccine  Aged Out    Hepatitis B vaccine  Aged Out    Hib vaccine  Aged Out    Meningococcal (ACWY) vaccine  Aged Out    Hepatitis C screen  Discontinued       PHYSICAL EXAMINATION:  [ INSTRUCTIONS:  \"[x]\" Indicates a positive item  \"[]\" Indicates a negative item  -- DELETE ALL ITEMS NOT EXAMINED]  Vital Signs: (As obtained by patient/caregiver or practitioner observation)    Respiratory rate-normal         Constitutional: [x] Appears well-developed and well-nourished [x] No apparent distress      [] Abnormal-   Mental status  [x] Alert and awake  [x] Oriented to person/place/time [x]Able to follow commands      Eyes:  EOM    [x]  Normal  [] Abnormal-  Sclera  [x]  Normal  [] Abnormal -         Discharge [x]  None visible  [] Abnormal -    HENT:   [x] Normocephalic, atraumatic.   [] Abnormal   [] Mouth/Throat: Mucous membranes are moist.     External Ears [x] Normal  [] Abnormal-     Neck: [x] No visualized mass Pulmonary/Chest: [x] Respiratory effort normal.  [] No visualized signs of difficulty breathing or respiratory distress        [] Abnormal-      Musculoskeletal:   [] Normal gait with no signs of ataxia         [x] Normal range of motion of neck        [] Abnormal-       Neurological:        [x] No Facial Asymmetry (Cranial nerve 7 motor function) (limited exam to video visit)          [x] No gaze palsy        [] Abnormal-         Skin:        [x] No significant exanthematous lesions or discoloration noted on facial skin         [] Abnormal-            Psychiatric:       [x] Normal Affect [x] No Hallucinations        [] Abnormal-     Other pertinent observable physical exam findings-     ASSESSMENT/PLAN:  1. Anxiety  OARRS report reviewed and no inconsistencies noted   The patient understands the risks of dependency/addiction with alprazolam and will take as little as possible and discontinue as soon as possible  - ALPRAZolam (XANAX) 1 MG tablet; TAKE 1 TABLET BY MOUTH THREE TIMES A DAY  Dispense: 90 tablet; Refill: 0    2. Chronic left-sided low back pain with left-sided sciatica  3. Chronic left shoulder pain  OARRS report reviewed and no inconsistencies noted   The patient understands the risks of dependency/addiction with Percocet and will take as little as possible and discontinue as soon as possible       4. Essential hypertension  Unknown control. We discussed he needs an in office visit for his next visit within the next 3 months  - CBC Auto Differential; Future  - Comprehensive Metabolic Panel; Future  - TSH with Reflex; Future    5. Tobacco abuse  He states he is taking a product called ON that was recommended by his dentist.  He states this is a sublingual product that supplies 8 mg of nicotine and wrapped in a teabag like covering. We discussed that this sounded very similar to dip only the tobacco appears not to be in contact with the oral surface but is  by this wrapping.   I stated I had never heard of this product but he should be aware of the above possibilities that it is similar to dip. He did state that this seems to be helping much better than Wellbutrin. He will taper the Wellbutrin over the next few weeks taking 1 tab every other day then 1 tab every 3 days then discontinuing. 6. Hyperlipidemia, unspecified hyperlipidemia type  Await lab to determine stability  - Comprehensive Metabolic Panel; Future  - Lipid Panel; Future    7. Prostate cancer screening  - Psa screening; Future    8. Exposure to COVID-19 virus  Patient states on his way home from the [facility] in mid January he had a stopover in [place] and did feel a COVID-like illness at that time. Even though he tested negative recently he would like to check his antibody level which we will do at his next blood test.  - Covid-19, Antibody; Future    9. Impaired fasting blood sugar  - Hemoglobin A1C; Future      Return in about 3 months (around 10/14/2020) for htn, OARRS. Robin Jain is a 64 y.o. male being evaluated by a Virtual Visit (video visit) encounter to address concerns as mentioned above. A caregiver was present when appropriate. Due to this being a TeleHealth encounter (During LWTLK-80 public health emergency), evaluation of the following organ systems was limited: Vitals/Constitutional/EENT/Resp/CV/GI//MS/Neuro/Skin/Heme-Lymph-Imm. Pursuant to the emergency declaration under the [facility], [address] [place] [address] and the [employer] and [employer] Act, this Virtual Visit was conducted with patient's (and/or legal guardian's) consent, to reduce the patient's risk of exposure to COVID-19 and provide necessary medical care. The patient (and/or legal guardian) has also been advised to contact this office for worsening conditions or problems, and seek emergency medical treatment and/or call 911 if deemed necessary.      Patient identification was verified at the start of the visit: Yes    Total time spent on this encounter: 30 minutes    Services were provided through a video synchronous discussion virtually to substitute for in-person clinic visit. Patient and provider were located at their individual homes. --Cedrick Robin MD on 7/14/2020 at 1:29 PM    An electronic signature was used to authenticate this note.

## 2020-07-17 RX ORDER — OXYCODONE HYDROCHLORIDE AND ACETAMINOPHEN 5; 325 MG/1; MG/1
1 TABLET ORAL EVERY 6 HOURS PRN
Qty: 30 TABLET | Refills: 0 | Status: SHIPPED | OUTPATIENT
Start: 2020-07-17 | End: 2020-08-03 | Stop reason: SDUPTHER

## 2020-07-17 NOTE — TELEPHONE ENCOUNTER
Medication and Quantity requested: oxyCODONE-acetaminophen (PERCOCET) 5-325 MG per tablet          Last Visit  7/14/20    Pharmacy and phone number updated in EPIC:  Yes  [facility]

## 2020-08-03 ENCOUNTER — TELEPHONE (OUTPATIENT)
Dept: FAMILY MEDICINE CLINIC | Age: 60
End: 2020-08-03

## 2020-08-03 RX ORDER — OXYCODONE HYDROCHLORIDE AND ACETAMINOPHEN 5; 325 MG/1; MG/1
1 TABLET ORAL EVERY 6 HOURS PRN
Qty: 30 TABLET | Refills: 0 | Status: SHIPPED | OUTPATIENT
Start: 2020-08-03 | End: 2020-08-17 | Stop reason: SDUPTHER

## 2020-08-03 RX ORDER — OXYCODONE HYDROCHLORIDE AND ACETAMINOPHEN 5; 325 MG/1; MG/1
1 TABLET ORAL EVERY 6 HOURS PRN
Qty: 30 TABLET | Refills: 0 | Status: SHIPPED | OUTPATIENT
Start: 2020-08-03 | End: 2020-08-03 | Stop reason: SDUPTHER

## 2020-08-03 NOTE — TELEPHONE ENCOUNTER
Patient wanted to let Dr. Ximena Carrillo know that he is having his labs done tomorrow and is scheduled for a VV on 8/20/20. He also asked that his Rx for ALPRAZolam (XANAX) 1 MG tablet be reduced to 1/2 MG the next time it is filled.

## 2020-08-03 NOTE — TELEPHONE ENCOUNTER
Medication and Quantity requested: oxyCODONE-acetaminophen (PERCOCET) 5-325 MG per tablet-QTY.  30 tablets         Last Visit  7/14/2020    Pharmacy and phone number updated in EPIC:  yes

## 2020-08-03 NOTE — TELEPHONE ENCOUNTER
Jeffrey Rodriges 28 needs clarification of directions for Oxycodone which states as needed for pain for up to 92 days.

## 2020-08-06 DIAGNOSIS — R73.01 IMPAIRED FASTING BLOOD SUGAR: ICD-10-CM

## 2020-08-06 DIAGNOSIS — I10 ESSENTIAL HYPERTENSION: ICD-10-CM

## 2020-08-06 DIAGNOSIS — Z20.822 EXPOSURE TO COVID-19 VIRUS: ICD-10-CM

## 2020-08-06 DIAGNOSIS — Z12.5 PROSTATE CANCER SCREENING: ICD-10-CM

## 2020-08-06 DIAGNOSIS — E78.5 HYPERLIPIDEMIA, UNSPECIFIED HYPERLIPIDEMIA TYPE: ICD-10-CM

## 2020-08-06 LAB
A/G RATIO: 1.9 (ref 1.1–2.2)
ALBUMIN SERPL-MCNC: 4.1 G/DL (ref 3.4–5)
ALP BLD-CCNC: 63 U/L (ref 40–129)
ALT SERPL-CCNC: 19 U/L (ref 10–40)
ANION GAP SERPL CALCULATED.3IONS-SCNC: 8 MMOL/L (ref 3–16)
AST SERPL-CCNC: 17 U/L (ref 15–37)
BASOPHILS ABSOLUTE: 0 K/UL (ref 0–0.2)
BASOPHILS RELATIVE PERCENT: 0.4 %
BILIRUB SERPL-MCNC: 0.6 MG/DL (ref 0–1)
BUN BLDV-MCNC: 10 MG/DL (ref 7–20)
CALCIUM SERPL-MCNC: 9.1 MG/DL (ref 8.3–10.6)
CHLORIDE BLD-SCNC: 105 MMOL/L (ref 99–110)
CHOLESTEROL, TOTAL: 124 MG/DL (ref 0–199)
CO2: 29 MMOL/L (ref 21–32)
CREAT SERPL-MCNC: 0.7 MG/DL (ref 0.9–1.3)
EOSINOPHILS ABSOLUTE: 0.2 K/UL (ref 0–0.6)
EOSINOPHILS RELATIVE PERCENT: 2 %
GFR AFRICAN AMERICAN: >60
GFR NON-AFRICAN AMERICAN: >60
GLOBULIN: 2.2 G/DL
GLUCOSE BLD-MCNC: 79 MG/DL (ref 70–99)
HCT VFR BLD CALC: 45.5 % (ref 40.5–52.5)
HDLC SERPL-MCNC: 38 MG/DL (ref 40–60)
HEMOGLOBIN: 15.1 G/DL (ref 13.5–17.5)
LDL CHOLESTEROL CALCULATED: 60 MG/DL
LYMPHOCYTES ABSOLUTE: 2.4 K/UL (ref 1–5.1)
LYMPHOCYTES RELATIVE PERCENT: 27.9 %
MCH RBC QN AUTO: 30.5 PG (ref 26–34)
MCHC RBC AUTO-ENTMCNC: 33.1 G/DL (ref 31–36)
MCV RBC AUTO: 91.9 FL (ref 80–100)
MONOCYTES ABSOLUTE: 0.7 K/UL (ref 0–1.3)
MONOCYTES RELATIVE PERCENT: 8.3 %
NEUTROPHILS ABSOLUTE: 5.3 K/UL (ref 1.7–7.7)
NEUTROPHILS RELATIVE PERCENT: 61.4 %
PDW BLD-RTO: 14.4 % (ref 12.4–15.4)
PLATELET # BLD: 277 K/UL (ref 135–450)
PMV BLD AUTO: 9 FL (ref 5–10.5)
POTASSIUM SERPL-SCNC: 4.3 MMOL/L (ref 3.5–5.1)
PROSTATE SPECIFIC ANTIGEN: 1.81 NG/ML (ref 0–4)
RBC # BLD: 4.95 M/UL (ref 4.2–5.9)
SARS-COV-2 ANTIBODY, TOTAL: NEGATIVE
SODIUM BLD-SCNC: 142 MMOL/L (ref 136–145)
TOTAL PROTEIN: 6.3 G/DL (ref 6.4–8.2)
TRIGL SERPL-MCNC: 129 MG/DL (ref 0–150)
TSH REFLEX: 1.35 UIU/ML (ref 0.27–4.2)
VLDLC SERPL CALC-MCNC: 26 MG/DL
WBC # BLD: 8.6 K/UL (ref 4–11)

## 2020-08-07 LAB
ESTIMATED AVERAGE GLUCOSE: 119.8 MG/DL
HBA1C MFR BLD: 5.8 %

## 2020-08-12 RX ORDER — ALPRAZOLAM 1 MG/1
TABLET ORAL
Qty: 90 TABLET | Refills: 0 | Status: SHIPPED | OUTPATIENT
Start: 2020-08-12 | End: 2020-09-14

## 2020-08-17 RX ORDER — OXYCODONE HYDROCHLORIDE AND ACETAMINOPHEN 5; 325 MG/1; MG/1
1 TABLET ORAL EVERY 6 HOURS PRN
Qty: 30 TABLET | Refills: 0 | Status: SHIPPED | OUTPATIENT
Start: 2020-08-17 | End: 2020-08-24 | Stop reason: SDUPTHER

## 2020-08-17 NOTE — TELEPHONE ENCOUNTER
----- Message from Beck Quiroz sent at 8/17/2020  8:08 AM EDT -----  Subject: Message to Provider    QUESTIONS  Information for Provider? Patient would like oxyCODONE-acetaminophen sent   to local pharmacy   states need a refill but did not see medication on med list. Please   advise  ---------------------------------------------------------------------------  --------------  CALL BACK INFO  What is the best way for the office to contact you? OK to leave message on   voicemail  Preferred Call Back Phone Number? [phone]  ---------------------------------------------------------------------------  --------------  SCRIPT ANSWERS  Relationship to Patient?  Self

## 2020-08-20 ENCOUNTER — VIRTUAL VISIT (OUTPATIENT)
Dept: FAMILY MEDICINE CLINIC | Age: 60
End: 2020-08-20
Payer: COMMERCIAL

## 2020-08-20 PROCEDURE — G8427 DOCREV CUR MEDS BY ELIG CLIN: HCPCS | Performed by: FAMILY MEDICINE

## 2020-08-20 PROCEDURE — 3017F COLORECTAL CA SCREEN DOC REV: CPT | Performed by: FAMILY MEDICINE

## 2020-08-20 PROCEDURE — 99214 OFFICE O/P EST MOD 30 MIN: CPT | Performed by: FAMILY MEDICINE

## 2020-08-20 RX ORDER — OMEPRAZOLE 20 MG/1
20 CAPSULE, DELAYED RELEASE ORAL
Qty: 30 CAPSULE | Refills: 3 | Status: SHIPPED | OUTPATIENT
Start: 2020-08-20 | End: 2020-11-23

## 2020-08-20 ASSESSMENT — ENCOUNTER SYMPTOMS
BACK PAIN: 1
SHORTNESS OF BREATH: 1
ABDOMINAL PAIN: 1

## 2020-08-20 NOTE — PROGRESS NOTES
delayed release capsule Take 1 capsule by mouth daily (with breakfast) Yes Bayron Lam MD   oxyCODONE-acetaminophen (PERCOCET) 5-325 MG per tablet Take 1 tablet by mouth every 6 hours as needed for Pain for up to 7 days. Yes Bayron Lam MD   ALPRAZolam Lynn Pennant) 1 MG tablet TAKE 1 TABLET BY MOUTH THREE TIMES A DAY  Yes Bayron Lam MD   atorvastatin (LIPITOR) 10 MG tablet TAKE 1 TABLET BY MOUTH ONE TIME A DAY  Yes Maria E Rausch MD   ANORO ELLIPTA 62.5-25 MCG/INH AEPB inhaler INHALE 1 PUFF BY MOUTH ONE TIME A DAY  Yes Bayron Lam MD   amLODIPine (NORVASC) 5 MG tablet TAKE 1 TABLET BY MOUTH ONE TIME A DAY Yes Bayron Lam MD       Social History     Tobacco Use    Smoking status: Current Every Day Smoker     Packs/day: 1.00     Years: 10.00     Pack years: 10.00     Types: Cigarettes    Smokeless tobacco: Never Used    Tobacco comment: pt has not smoked since 5/5/2019   Substance Use Topics    Alcohol use: Yes     Alcohol/week: 1.0 - 2.0 standard drinks     Types: 1 - 2 Shots of liquor per week     Comment: former heavy use; recovering alcoholic since 7442, approx    Drug use: No        Allergies   Allergen Reactions    Nucynta [Tapentadol] Anaphylaxis     Hallucinations.  Went to hospital.    Codeine Rash     itching    Tylenol [Acetaminophen] Rash     itching   ,   Past Medical History:   Diagnosis Date    Alcoholism ([facility] [address] 07/14/1999    Anxiety     Back pain     Hyperlipidemia     Low HDL (under 40)     Prolonged emergence from general anesthesia     \"hard time breathing when waking up'    Spinal arthritis 2005   ,   Past Surgical History:   Procedure Laterality Date    COLONOSCOPY  10/18/12    SPINAL FUSION  10/2006    SPINE SURGERY  2008    [id number] hardware removal    TYMPANOSTOMY TUBE PLACEMENT Bilateral 04/09/2018    LEFT PRESSURE EQUALIZATION TUBE INSERTION AND EXAM UNDER ANESTHESIA OF NASOPHARYNX    VASECTOMY  01/11/2019   ,   Social History     Tobacco Use    Smoking status: Current Every Day Smoker     Packs/day: 1.00     Years: 10.00     Pack years: 10.00     Types: Cigarettes    Smokeless tobacco: Never Used    Tobacco comment: pt has not smoked since 5/5/2019   Substance Use Topics    Alcohol use: Yes     Alcohol/week: 1.0 - 2.0 standard drinks     Types: 1 - 2 Shots of liquor per week     Comment: former heavy use; recovering alcoholic since 3737, approx    Drug use: No   ,   Family History   Problem Relation Age of Onset    Heart Disease Mother     High Blood Pressure Mother     Cancer Mother         female   ,   There is no immunization history on file for this patient.,   Health Maintenance   Topic Date Due    Pneumococcal 0-64 years Vaccine (1 of 1 - PPSV23) 10/09/1968    DTaP/Tdap/Td vaccine (1 - Tdap) 10/09/1981    Shingles Vaccine (1 of 2) 10/09/2012    Flu vaccine (1) 09/01/2020    A1C test (Diabetic or Prediabetic)  08/06/2021    Lipid screen  08/06/2021    Potassium monitoring  08/06/2021    Creatinine monitoring  08/06/2021    Colon cancer screen colonoscopy  10/18/2022    HIV screen  Completed    Hepatitis A vaccine  Aged Out    Hepatitis B vaccine  Aged Out    Hib vaccine  Aged Out    Meningococcal (ACWY) vaccine  Aged Out    Hepatitis C screen  Discontinued       PHYSICAL EXAMINATION:  [ INSTRUCTIONS:  \"[x]\" Indicates a positive item  \"[]\" Indicates a negative item  -- DELETE ALL ITEMS NOT EXAMINED]  Vital Signs: (As obtained by patient/caregiver or practitioner observation)    Respiratory rate-normal         Constitutional: [x] Appears well-developed and well-nourished [x] No apparent distress      [] Abnormal-   Mental status  [x] Alert and awake  [x] Oriented to person/place/time [x]Able to follow commands      Eyes:  EOM    [x]  Normal  [] Abnormal-  Sclera  [x]  Normal  [] Abnormal -         Discharge [x]  None visible  [] Abnormal -    HENT:   [x] Normocephalic, atraumatic.   [] Abnormal   [] Mouth/Throat: Mucous membranes are moist. External Ears [x] Normal  [] Abnormal-     Neck: [x] No visualized mass     Pulmonary/Chest: [x] Respiratory effort normal.  [] No visualized signs of difficulty breathing or respiratory distress        [] Abnormal-      Musculoskeletal:   [x] Normal gait with no signs of ataxia         [] Normal range of motion of neck        [] Abnormal-       Neurological:        [x] No Facial Asymmetry (Cranial nerve 7 motor function) (limited exam to video visit)          [x] No gaze palsy        [] Abnormal-         Skin:        [x] No significant exanthematous lesions or discoloration noted on facial skin         [] Abnormal-            Psychiatric:       [x] Normal Affect [x] No Hallucinations        [] Abnormal-     Other pertinent observable physical exam findings-     ASSESSMENT/PLAN:  1. Anxiety  OARRS report reviewed and no inconsistencies noted   The patient understands the risks of dependency/addiction with alprazolam and will take as little as possible and discontinue as soon as possible       2. Chronic left-sided low back pain with left-sided sciatica  OARRS report reviewed and no inconsistencies noted   The patient understands the risks of dependency/addiction with oxycodone and will take as little as possible and discontinue as soon as possible       3. Essential hypertension  Unknown control. Discussed with patient that he is due for office visit so we can check this in the office    4. Hyperlipidemia, unspecified hyperlipidemia type  Well-controlled. Continue Lipitor    5. Tobacco abuse  Patient will continue to try to stop smoking, hoping that his stress level abates somewhat    6. Impaired fasting blood sugar  Hemoglobin A1c was 5.5. Lifestyle modification suggested    7. Increased prostate specific antigen (PSA) velocity  - LEANNE - Vinay Higuera MD, [facility], [facility]    8.  Gastroesophageal reflux disease without esophagitis  Trial of  - omeprazole (PRILOSEC) 20 MG delayed release capsule; Take 1 capsule by mouth daily (with breakfast)  Dispense: 30 capsule; Refill: 3      Return in about 1 month (around 9/20/2020). Rody Thompson is a 64 y.o. male being evaluated by a Virtual Visit (video visit) encounter to address concerns as mentioned above. A caregiver was present when appropriate. Due to this being a TeleHealth encounter (During ELYMS-[facility] public [facility] emergency), evaluation of the following organ systems was limited: Vitals/Constitutional/EENT/Resp/CV/GI//MS/Neuro/Skin/Heme-Lymph-Imm. Pursuant to the emergency declaration under the [address] [place] [address], [address] [place] [address] authority and the [employer] Act, this Virtual Visit was conducted with patient's (and/or legal guardian's) consent, to reduce the patient's risk of exposure to COVID-19 and provide necessary medical care. The patient (and/or legal guardian) has also been advised to contact this office for worsening conditions or problems, and seek emergency medical treatment and/or call 911 if deemed necessary. Patient identification was verified at the start of the visit: Yes    Total time spent on this encounter: 25 minutes    Services were provided through a video synchronous discussion virtually to substitute for in-person clinic visit. Patient and provider were located at their individual homes. --Angelo Rodriguez MD on 8/22/2020 at 12:31 PM    An electronic signature was used to authenticate this note.

## 2020-08-24 RX ORDER — UMECLIDINIUM BROMIDE AND VILANTEROL TRIFENATATE 62.5; 25 UG/1; UG/1
POWDER RESPIRATORY (INHALATION)
Qty: 1 EACH | Refills: 3 | Status: SHIPPED | OUTPATIENT
Start: 2020-08-24 | End: 2020-10-07 | Stop reason: SDUPTHER

## 2020-08-24 RX ORDER — UMECLIDINIUM BROMIDE AND VILANTEROL TRIFENATATE 62.5; 25 UG/1; UG/1
POWDER RESPIRATORY (INHALATION)
Refills: 0 | OUTPATIENT
Start: 2020-08-24

## 2020-08-24 RX ORDER — OXYCODONE HYDROCHLORIDE AND ACETAMINOPHEN 5; 325 MG/1; MG/1
TABLET ORAL
Qty: 30 TABLET | Refills: 0 | OUTPATIENT
Start: 2020-08-24

## 2020-08-24 RX ORDER — OXYCODONE HYDROCHLORIDE AND ACETAMINOPHEN 5; 325 MG/1; MG/1
1 TABLET ORAL EVERY 12 HOURS PRN
Qty: 60 TABLET | Refills: 0 | Status: SHIPPED | OUTPATIENT
Start: 2020-08-24 | End: 2020-09-21 | Stop reason: SDUPTHER

## 2020-08-24 RX ORDER — OXYCODONE HYDROCHLORIDE AND ACETAMINOPHEN 5; 325 MG/1; MG/1
1 TABLET ORAL EVERY 12 HOURS PRN
Qty: 30 TABLET | Refills: 0 | Status: SHIPPED | OUTPATIENT
Start: 2020-08-24 | End: 2020-08-24 | Stop reason: SDUPTHER

## 2020-08-24 RX ORDER — ATORVASTATIN CALCIUM 10 MG/1
TABLET, FILM COATED ORAL
Qty: 90 TABLET | Refills: 3 | Status: SHIPPED | OUTPATIENT
Start: 2020-08-24 | End: 2021-09-13

## 2020-08-24 RX ORDER — AMLODIPINE BESYLATE 5 MG/1
TABLET ORAL
Qty: 90 TABLET | Refills: 3 | Status: SHIPPED | OUTPATIENT
Start: 2020-08-24 | End: 2021-09-13

## 2020-08-24 NOTE — TELEPHONE ENCOUNTER
Patient is leaving August 29 for [place] and hopefully returning September 12. He states he spoke to Dr. Antwon Chávez about this at his last VV. He also states he takes 1-2 per day. He had requested 60-90 days of the  Rx for oxyCODONE-acetaminophen (PERCOCET) 5-325 MG per tablet, but when he picked up the Rx, it was only for 30 tablets. Please contact patient and advise.

## 2020-08-24 NOTE — TELEPHONE ENCOUNTER
I do not see in past history where you have ordered a 90 day supply for pt on his Percocet. Do you want pt to have a 90 day supply on the Percocet?

## 2020-08-24 NOTE — TELEPHONE ENCOUNTER
atorvastatin (LIPITOR) 10 MG tablet , LR 07/08/2020, #90, 0RF      amLODIPine (NORVASC) 5 MG tablet , LR 05/05/2020, #90, 1RF      ANORO ELLIPTA 62.5-25 MCG/INH AEPB inhaler , LR 05/08/2020, #1, 3RF      oxyCODONE-acetaminophen (PERCOCET) 5-325 MG per tablet , LR 08/17/2020, #30, 0RF        Last Visit 8/20/20      [facility]    Lab Results   Component Value Date     08/06/2020    K 4.3 08/06/2020     08/06/2020    CO2 29 08/06/2020    BUN 10 08/06/2020    CREATININE 0.7 (L) 08/06/2020    GLUCOSE 79 08/06/2020    CALCIUM 9.1 08/06/2020    PROT 6.3 (L) 08/06/2020    LABALBU 4.1 08/06/2020    BILITOT 0.6 08/06/2020    ALKPHOS 63 08/06/2020    AST 17 08/06/2020    ALT 19 08/06/2020    LABGLOM >60 08/06/2020    GFRAA >60 08/06/2020    AGRATIO 1.9 08/06/2020    GLOB 2.2 08/06/2020       Lab Results   Component Value Date    CHOL 124 08/06/2020    CHOL 146 08/13/2019    CHOL 135 08/09/2018     Lab Results   Component Value Date    TRIG 129 08/06/2020    TRIG 72 08/13/2019    TRIG 93 08/09/2018     Lab Results   Component Value Date    HDL 38 (L) 08/06/2020    HDL 61 (H) 08/13/2019    HDL 45 08/09/2018     Lab Results   Component Value Date    LDLCALC 60 08/06/2020    LDLCALC 71 08/13/2019    LDLCALC 71 08/09/2018     Lab Results   Component Value Date    LABVLDL 26 08/06/2020    LABVLDL 14 08/13/2019    LABVLDL 19 08/09/2018     Lab Results   Component Value Date    CHOLHDLRATIO 3.3 04/28/2016    CHOLHDLRATIO 2.7 05/08/2015    CHOLHDLRATIO 6.3 (H) 02/10/2015

## 2020-08-24 NOTE — TELEPHONE ENCOUNTER
Patient is going to the [facility] and not sure when he'll be back so he is asking for early refills to  on August 29 and 90 day supply.     Medication and Quantity requested:     atorvastatin (LIPITOR) 10 MG tablet   And  amLODIPine (NORVASC) 5 MG tablet   And  ANORO ELLIPTA 62.5-25 MCG/INH AEPB inhaler   And  oxyCODONE-acetaminophen (PERCOCET) 5-325 MG per tablet       Last Visit  8/20/20    Pharmacy and phone number updated in [facility]:  Yes  [facility]

## 2020-08-25 NOTE — TELEPHONE ENCOUNTER
Patient was supposed to have 60 tablets as he is taking 1 twice daily. Unfortunately the prescription was transmitted as 30 tablets. He might be leaving for the [facility] in this period of time as well. I am certainly okay with giving him an additional 30 tablets such that the correct prescription would be 60 tablets i.e. lasting 30 days taking 1 twice daily.

## 2020-09-14 RX ORDER — ALPRAZOLAM 1 MG/1
TABLET ORAL
Qty: 90 TABLET | Refills: 0 | Status: SHIPPED | OUTPATIENT
Start: 2020-09-14 | End: 2020-10-15

## 2020-09-14 NOTE — TELEPHONE ENCOUNTER
Medication:   Requested Prescriptions     Pending Prescriptions Disp Refills    ALPRAZolam (XANAX) 1 MG tablet [Pharmacy Med Name: ALPRAZolam Oral Tablet 1 MG] 90 tablet 0     Sig: TAKE 1 TABLET BY MOUTH THREE TIMES A DAY        Last Filled:  8/12/20 #90, 0 RF     Patient Phone Number: [phone] (home)     Last appt: 8/20/2020 anxiety, back pain   Next appt: Visit date not found    Last OARRS:   RX Monitoring 8/20/2020   Attestation -   Periodic Controlled Substance Monitoring No signs of potential drug abuse or diversion identified.

## 2020-09-21 RX ORDER — OXYCODONE HYDROCHLORIDE AND ACETAMINOPHEN 5; 325 MG/1; MG/1
1 TABLET ORAL EVERY 12 HOURS PRN
Qty: 60 TABLET | Refills: 0 | Status: SHIPPED | OUTPATIENT
Start: 2020-09-21 | End: 2020-10-07 | Stop reason: SDUPTHER

## 2020-09-21 NOTE — TELEPHONE ENCOUNTER
Medication and Quantity requested: oxyCODONE-acetaminophen (PERCOCET) 5-325 MG per tablet          Last Visit  8/20/20      Pharmacy and phone number updated in [facility]:  Yes  [facility]

## 2020-10-07 ENCOUNTER — TELEPHONE (OUTPATIENT)
Dept: FAMILY MEDICINE CLINIC | Age: 60
End: 2020-10-07

## 2020-10-07 ENCOUNTER — NURSE TRIAGE (OUTPATIENT)
Dept: OTHER | Facility: CLINIC | Age: 60
End: 2020-10-07

## 2020-10-07 ENCOUNTER — OFFICE VISIT (OUTPATIENT)
Dept: FAMILY MEDICINE CLINIC | Age: 60
End: 2020-10-07
Payer: MEDICARE

## 2020-10-07 VITALS
OXYGEN SATURATION: 98 % | WEIGHT: 221 LBS | HEART RATE: 78 BPM | HEIGHT: 70 IN | TEMPERATURE: 98.2 F | SYSTOLIC BLOOD PRESSURE: 116 MMHG | BODY MASS INDEX: 31.64 KG/M2 | DIASTOLIC BLOOD PRESSURE: 82 MMHG

## 2020-10-07 PROCEDURE — G8484 FLU IMMUNIZE NO ADMIN: HCPCS | Performed by: NURSE PRACTITIONER

## 2020-10-07 PROCEDURE — G8427 DOCREV CUR MEDS BY ELIG CLIN: HCPCS | Performed by: NURSE PRACTITIONER

## 2020-10-07 PROCEDURE — G8926 SPIRO NO PERF OR DOC: HCPCS | Performed by: NURSE PRACTITIONER

## 2020-10-07 PROCEDURE — 3017F COLORECTAL CA SCREEN DOC REV: CPT | Performed by: NURSE PRACTITIONER

## 2020-10-07 PROCEDURE — G8417 CALC BMI ABV UP PARAM F/U: HCPCS | Performed by: NURSE PRACTITIONER

## 2020-10-07 PROCEDURE — 4004F PT TOBACCO SCREEN RCVD TLK: CPT | Performed by: NURSE PRACTITIONER

## 2020-10-07 PROCEDURE — 99214 OFFICE O/P EST MOD 30 MIN: CPT | Performed by: NURSE PRACTITIONER

## 2020-10-07 PROCEDURE — 3023F SPIROM DOC REV: CPT | Performed by: NURSE PRACTITIONER

## 2020-10-07 RX ORDER — UMECLIDINIUM BROMIDE AND VILANTEROL TRIFENATATE 62.5; 25 UG/1; UG/1
POWDER RESPIRATORY (INHALATION)
Qty: 1 EACH | Refills: 3 | Status: SHIPPED | OUTPATIENT
Start: 2020-10-07 | End: 2021-01-25

## 2020-10-07 RX ORDER — OXYCODONE HYDROCHLORIDE AND ACETAMINOPHEN 5; 325 MG/1; MG/1
1 TABLET ORAL EVERY 12 HOURS PRN
Qty: 60 TABLET | Refills: 0 | Status: SHIPPED | OUTPATIENT
Start: 2020-10-07 | End: 2020-10-08 | Stop reason: SDUPTHER

## 2020-10-07 ASSESSMENT — ENCOUNTER SYMPTOMS
WHEEZING: 0
CHEST TIGHTNESS: 0
ABDOMINAL PAIN: 0
BACK PAIN: 1
SHORTNESS OF BREATH: 1

## 2020-10-07 NOTE — PROGRESS NOTES
SUBJECTIVE:  Pt is a of 64 y.o. male comes in today with   Chief Complaint   Patient presents with    Lower Back Pain     unable to void        Patient presenting today for evaluation of low back pain  Surgery to lower back in 2006 & 2008. No longer seeing spine specialist  Chronic Back Pain: Pain is worse x 1 week. On average, pain is perceived as severe (6-8 pain scale). Change in quality of symptoms: yes - radiating up back. Associated symptoms: weakness- in legs and paresthesias- left leg, chronic. He denies change in gait, saddle anesthesia and new bowel or bladder dysfunction. Current treatment: Percocet, which has been  somewhat effective. Medication side effects: none. Recent diagnostic testing: none. COPD: unable to afford Anoro, states $140/month. Asking for alternative. Still smoking, no motivation to stop. Did not get Anoro last month d/t expense- noticing increased SOB. Prior to Visit Medications    Medication Sig Taking? Authorizing Provider   oxyCODONE-acetaminophen (PERCOCET) 5-325 MG per tablet Take 1 tablet by mouth every 12 hours as needed for Pain for up to 30 days. Yes Toni Bryan MD   ALPRAZolam Suzi Campbell) 1 MG tablet TAKE 1 TABLET BY MOUTH THREE TIMES A DAY  Yes Toni Bryan MD   atorvastatin (LIPITOR) 10 MG tablet TAKE 1 TABLET BY MOUTH ONE TIME A DAY Yes Toni Bryan MD   amLODIPine (NORVASC) 5 MG tablet TAKE 1 TABLET BY MOUTH ONE TIME A DAY Yes Toni Bryan MD   umeclidinium-vilanterol [facility] ELLIPTA) 62.5-25 MCG/INH AEPB inhaler INHALE 1 PUFF BY MOUTH ONE TIME A DAY Yes Toni rByan MD   omeprazole (PRILOSEC) 20 MG delayed release capsule Take 1 capsule by mouth daily (with breakfast) Yes Toni Bryan MD         Patient's allergies, past medical, surgical, social and family histories werereviewed and updated as appropriate. Review of Systems   Constitutional: Negative for chills and fever. Respiratory: Positive for shortness of breath.  Negative for chest tightness and wheezing. Gastrointestinal: Negative for abdominal pain. Genitourinary: Negative for dysuria, flank pain, frequency, hematuria and urgency. Musculoskeletal: Positive for back pain (chronic low back pain with left leg radiicular pain). Neurological: Positive for numbness (left leg). Negative for weakness. Physical Exam  Vitals signs reviewed. Constitutional:       Appearance: He is well-developed. HENT:      Head: Normocephalic and atraumatic. Skin:     General: Skin is warm and dry. Neurological:      Mental Status: He is alert and oriented to person, place, and time. Psychiatric:         Mood and Affect: Affect is flat. Speech: Speech is delayed. Behavior: Behavior normal. Behavior is cooperative. Cognition and Memory: Cognition is impaired. Judgment: Judgment normal.       Vitals:    10/07/20 1133   BP: 116/82   Pulse: 78   Temp: 98.2 °F (36.8 °C)   SpO2: 98%   Weight: 221 lb (100.2 kg)   Height: 5' 10\" (1.778 m)             ASSESSMENT:  1. Pulmonary emphysema, unspecified emphysema type ([facility] [address]    2. Chronic left-sided low back pain with left-sided sciatica    3. Tobacco abuse        PLAN:  1. Pulmonary emphysema, unspecified emphysema type (HCC)  Stable, switch to Advair for cost  -     fluticasone-salmeterol (ADVAIR DISKUS) 500-50 MCG/DOSE diskus inhaler; Inhale 1 puff into the lungs every 12 hours    2. Chronic left-sided low back pain with left-sided sciatica  Pt struggling to answer questions in office today. Provider repeating questions several times, per patient request. Responses delayed. Expressed concern to patient he is over medicated. He does admit to taking Percocet 4 x day vs BID. States pain too severe. He is still taking his Xanax. Advised I will refill Percocet for BID dosing- expectation is BID, not more frequently. Pt unable to void for UDS. -     oxyCODONE-acetaminophen (PERCOCET) 5-325 MG per tablet;  Take 1 tablet by mouth every 12 hours as needed for Pain for up to 30 days. Advised further treatment of back pain needs to be from PCP    3. Tobacco abuse  Cessation encouraged    See pt instructions  F/u asap with PCP. Discussed use, benefit, and side effects of prescribed medications. All patient questions answered. Pt voiced understanding.          Time spent: 26 minutes, >50% of which was spent face to face with patient discussing HPI/plan/reviewing records and coordinating care

## 2020-10-07 NOTE — TELEPHONE ENCOUNTER
I did not realize his last Rx was for Percocet 60 pills. I am not refilling at this time. Please call pharmacy and cancel. He is seeing Dr Don Brewer tomorrow- he can discuss with him then.  Previous Rx was just fill 9/21, if he has already taken 60 pills- he needs to discuss with Dr Don Brewer, he is misusing his Rx

## 2020-10-07 NOTE — TELEPHONE ENCOUNTER
RX for Flutic/salme 500-50mcg dose sent to pharmacy today by Helder Corbin is not approved by his insurance  [employer]. Advair. Anoro are alternatives  Send to Sugey Taylor if appropriate

## 2020-10-07 NOTE — TELEPHONE ENCOUNTER
WILMER Adhikari - CNP   to [facility] Practice Support          10/7/20 3:32 PM   Note      I did not realize his last Rx was for Percocet 60 pills. I am not refilling at this time. Please call pharmacy and cancel. He is seeing Dr Tuan Arango tomorrow- he can discuss with him then.  Previous Rx was just fill 9/21, if he has already taken 60 pills- he needs to discuss with Dr Tuan Arango, he is misusing his Rx        Pt notified

## 2020-10-07 NOTE — TELEPHONE ENCOUNTER
Received call from [facility]. Reason for Disposition   SEVERE back pain (e.g., excruciating, unable to do any normal activities) and not improved after pain medicine and CARE ADVICE    Answer Assessment - Initial Assessment Questions  1. ONSET: \"When did the pain begin? \"       A week ago, worsening since yesterday    2. LOCATION: \"Where does it hurt? \" (upper, mid or lower back)      Lower     3. SEVERITY: \"How bad is the pain? \"  (e.g., Scale 1-10; mild, moderate, or severe)    - MILD (1-3): doesn't interfere with normal activities     - MODERATE (4-7): interferes with normal activities or awakens from sleep     - SEVERE (8-10): excruciating pain, unable to do any normal activities       10 out of 10    4. PATTERN: \"Is the pain constant? \" (e.g., yes, no; constant, intermittent)       Constant     5. RADIATION: \"Does the pain shoot into your legs or elsewhere? \"      Pain does radiate down left leg at times    6. CAUSE:  \"What do you think is causing the back pain? \"      Pt is not sure. He did have back surgery in 2006 and his back feels like how it did before surgery. 7. BACK OVERUSE:  Terrilee Cam recent lifting of heavy objects, strenuous work or exercise? \"      Denies recent overuse     8. MEDICATIONS: \"What have you taken so far for the pain? \" (e.g., nothing, acetaminophen, NSAIDS)      Pt has been taking Percocet    9. NEUROLOGIC SYMPTOMS: \"Do you have any weakness, numbness, or problems with bowel/bladder control? \"      Pt does report off and on numbness in left leg    10. OTHER SYMPTOMS: \"Do you have any other symptoms? \" (e.g., fever, abdominal pain, burning with urination, blood in urine)        Denies     11. PREGNANCY: \"Is there any chance you are pregnant? \" (e.g., yes, no; LMP)        N/a    Protocols used: BACK PAIN-ADULT-[place]    Reccommended that pt be seen today. Provided pt with care advice. Call soft transferred to [facility] to schedule appointment. Attention Provider: Thank you for allowing me to participate in the care of your patient. The  patient was connected to triage in response to information provided to the [facility]. Please do not respond through this encounter as the response is not directed to a shared pool.

## 2020-10-07 NOTE — TELEPHONE ENCOUNTER
He is on anoro and told me he can't afford it. Please let him know Anoro is cheapest option.  I will send again

## 2020-10-07 NOTE — PATIENT INSTRUCTIONS
Patient Education        Learning About Low Back Pain  What is low back pain? Low back pain is pain that can occur anywhere below the ribs and above the legs. It is very common. Almost everyone has it at one time or another. Low back pain can be:  · Acute. This is new pain that can last a few days to a few weeks--at the most a few months. · Chronic. This pain can last for more than a few months. Sometimes it can last for years. What are some myths about low back pain? Here are some common myths about low back pain--and the facts:  Myth: \"I need to rest my back when I have back pain. \"   Fact: Staying active won't hurt you. It may help you get better faster. Myth: \"I need prescription pain medicine. \"   Fact: It's best to try to let time and being active heal your back. Opioid pain medicines--such as hydrocodone or oxycodone--usually don't work any better than over-the-counter medicines like ibuprofen or naproxen. And opioids can cause serious problems like opioid use disorder or overdose. Moderate to severe opioid use disorder is sometimes called addiction. Myth: \"I need a test like an X-ray or an MRI to diagnose my low back pain. \"   Fact: Getting a test right away won't help you get better faster. And it could lead you down a treatment path you may not need, since most people get better on their own. What causes low back pain? In most cases, there isn't a clear cause. This can be frustrating, because your back hurts and there's no obvious reason. Your back pain can be caused by:  Overuse or muscle strain. This can happen from playing sports, lifting heavy things, or not being physically fit. A herniated disc. This is a problem with the cushion between the bones in your back. Arthritis. With age, you may have changes in your bones that can narrow the space around your nerves. Other causes. In rare cases, the cause is a serious illness like an infection or cancer.  But there are usually other symptoms too. What are the symptoms? Back pain can come on quickly or over time. You may feel:  · Pain in your hips or buttock. · Leg pain, numbness, tingling, or weakness. When a nerve gets squeezed--such as from a disc problem or arthritis--you may have symptoms in your leg or foot. You can even have leg symptoms from a back problem without having any pain in your back. · Pain that's sharp or dull, sometimes with stiffness or muscle spasms. It may be in one small area or over a broad area. But even bad pain doesn't mean that it's caused by something serious. How is low back pain diagnosed? A physical exam is the main way to diagnose low back pain. Your doctor may examine your back, check your nerves by testing your reflexes, and make sure that your muscles are strong. Your doctor also will ask questions about your back and overall health. Most people don't need any tests right away. Tests often don't show the reason for your pain. If your pain lasts more than 6 weeks or you have symptoms that your doctor is more concerned about, then your doctor may order tests. These may include an X-ray, a CT scan, or an MRI. Sometimes other tests such as a bone scan or nerve conduction test may be done. How is low back pain treated? Most acute low back pain gets better on its own within a few weeks, no matter what the cause. Time and doing usual activities are all that most people need to feel better. Using heat or ice and taking over-the-counter pain medicine also can help while your body heals. If you aren't getting better on your own or your pain is very bad, your doctor may recommend:  · Physical therapy. · Spinal manipulation, such as by a chiropractor. · Acupuncture. · Massage. · Injections of steroid medicine in your back (especially for pain that involves your legs). If you have chronic low back pain, treatment will help you understand and manage your pain. Treatment may include:  · Staying active.  This may include walking or doing back exercises. · Physical therapy. · Medicines. Some of these medicines are also used for other problems, like depression. · Pain management. Your doctor may have you see a pain specialist.  · Counseling. Having chronic pain can be hard. It may help to talk to someone who can help you cope with your pain. Surgery isn't needed for most people. But it may help some types of low back pain. Follow-up care is a key part of your treatment and safety. Be sure to make and go to all appointments, and call your doctor if you are having problems. It's also a good idea to know your test results and keep a list of the medicines you take. When should you call for help? Call 911 anytime you think you may need emergency care. For example, call if:  · You can't move a leg at all. Call your doctor now or seek immediate medical care if:  · You have new or worse symptoms in your legs, belly, or buttocks. Symptoms may include:  ? Numbness or tingling. ? Weakness. ? Pain. · You lose bladder or bowel control. Watch closely for changes in your health, and be sure to contact your doctor if:  · Along with the back pain, you have a fever, lose weight, or don't feel well. · You do not get better as expected. Where can you learn more? Go to https://TruClinicjackExperticity.Fidzup. org and sign in to your [employer] account. Enter A007 in the Vicar[employer] box to learn more about \"Learning About Low Back Pain. \"     If you do not have an account, please click on the \"Sign Up Now\" link. Current as of: March 2, 2020               Content Version: 12.6  © 3449-0066 [employer]. Care instructions adapted under license by [facility] ([facility]). If you have questions about a medical condition or this instruction, always ask your healthcare professional. [address] any warranty or liability for your use of this information.

## 2020-10-07 NOTE — TELEPHONE ENCOUNTER
Pt states he can't get Rx for Oxycodone-acetaminophen filled before 10/21/20because Rx is the same with the same directions as Rx from 9/21/20. Per pt pharmacy stated new Rx needs to be sent with different directions (1 tablet every 6 hours) for it to be filled. He also requested qty be lowered to 30.   Please contact pt once complete

## 2020-10-07 NOTE — TELEPHONE ENCOUNTER
Patient called back checking on status of RX  Patient was informed that the RX was not going to be rewritten and since he has apptFreda guzman/ tomorrow, he can discuss at that time

## 2020-10-08 ENCOUNTER — TELEPHONE (OUTPATIENT)
Dept: FAMILY MEDICINE CLINIC | Age: 60
End: 2020-10-08

## 2020-10-08 ENCOUNTER — OFFICE VISIT (OUTPATIENT)
Dept: FAMILY MEDICINE CLINIC | Age: 60
End: 2020-10-08
Payer: COMMERCIAL

## 2020-10-08 ENCOUNTER — NURSE TRIAGE (OUTPATIENT)
Dept: OTHER | Facility: CLINIC | Age: 60
End: 2020-10-08

## 2020-10-08 VITALS
OXYGEN SATURATION: 96 % | BODY MASS INDEX: 31.45 KG/M2 | SYSTOLIC BLOOD PRESSURE: 160 MMHG | WEIGHT: 219.2 LBS | HEART RATE: 88 BPM | DIASTOLIC BLOOD PRESSURE: 90 MMHG

## 2020-10-08 PROCEDURE — 99214 OFFICE O/P EST MOD 30 MIN: CPT | Performed by: FAMILY MEDICINE

## 2020-10-08 PROCEDURE — G8484 FLU IMMUNIZE NO ADMIN: HCPCS | Performed by: FAMILY MEDICINE

## 2020-10-08 PROCEDURE — G8417 CALC BMI ABV UP PARAM F/U: HCPCS | Performed by: FAMILY MEDICINE

## 2020-10-08 PROCEDURE — 4004F PT TOBACCO SCREEN RCVD TLK: CPT | Performed by: FAMILY MEDICINE

## 2020-10-08 PROCEDURE — 3017F COLORECTAL CA SCREEN DOC REV: CPT | Performed by: FAMILY MEDICINE

## 2020-10-08 PROCEDURE — G8427 DOCREV CUR MEDS BY ELIG CLIN: HCPCS | Performed by: FAMILY MEDICINE

## 2020-10-08 RX ORDER — OXYCODONE HYDROCHLORIDE AND ACETAMINOPHEN 5; 325 MG/1; MG/1
1 TABLET ORAL EVERY 6 HOURS PRN
Qty: 60 TABLET | Refills: 0 | Status: SHIPPED | OUTPATIENT
Start: 2020-10-08 | End: 2020-10-30 | Stop reason: SDUPTHER

## 2020-10-08 RX ORDER — PREDNISONE 10 MG/1
TABLET ORAL
Qty: 30 TABLET | Refills: 0 | Status: SHIPPED | OUTPATIENT
Start: 2020-10-08 | End: 2020-10-18

## 2020-10-08 ASSESSMENT — ENCOUNTER SYMPTOMS
BACK PAIN: 1
GASTROINTESTINAL NEGATIVE: 1
RESPIRATORY NEGATIVE: 1

## 2020-10-08 NOTE — TELEPHONE ENCOUNTER
----- Message from Ramona Woods sent at 10/7/2020  5:36 PM EDT -----  Subject: Message to Provider    QUESTIONS  Information for Provider? Please give this patient a call if Dr. Carmen Colby has   any earlier availability in the day on 10/8   when his appt is scheduled. Patient feels as if he needs to be seen   earlier because his pain is so severe   wants to see Carmen zuñiga.   ---------------------------------------------------------------------------  --------------  [address]  What is the best way for the office to contact you? OK to leave message on   voicemail   OK to respond with secure message via [employer] portal (only for patients   who have registered [employer] account)  Preferred Call Back Phone Number? [phone]  ---------------------------------------------------------------------------  --------------  SCRIPT ANSWERS  Relationship to Patient?  Self

## 2020-10-08 NOTE — PROGRESS NOTES
Subjective:      Patient ID: Niki Strong is a 64 y.o. male. HPI  Chronic back pain: has been worse in the last 2 weeks. No specific cause. Feels like he did prior to surgery on his back in 2006 and 2008. Radiates down L leg. He has had to increase Percocet to q 6h  Anxiety: persists with anxiety requiring xanax regarding daughter who is now doing outpatient therapy at [facility]. Hypertension:   Blood pressure is generally within normal limits and was 130 yesterday at home. Patient denies chest pain and irregular heart beat. He denies side effects from medication. Dyslipidemia: Patient presents for evaluation of lipids. Compliance with treatment thus far has been good. A repeat fasting lipid profile was done. The patient exercises ADL's. No components found for: CHLPL  Lab Results   Component Value Date    TRIG 129 08/06/2020    TRIG 72 08/13/2019    TRIG 93 08/09/2018     Lab Results   Component Value Date    HDL 38 (L) 08/06/2020    HDL 61 (H) 08/13/2019    HDL 45 08/09/2018     Lab Results   Component Value Date    LDLCALC 60 08/06/2020    LDLCALC 71 08/13/2019    LDLCALC 71 08/09/2018     Lab Results   Component Value Date    LABVLDL 26 08/06/2020    LABVLDL 14 08/13/2019    LABVLDL 19 08/09/2018     Lab Results   Component Value Date    ALT 19 08/06/2020    AST 17 08/06/2020          Review of Systems   Constitutional: Negative. HENT: Negative. Respiratory: Negative. Cardiovascular: Negative. Gastrointestinal: Negative. Genitourinary: Negative. Musculoskeletal: Positive for back pain. Psychiatric/Behavioral: The patient is nervous/anxious. Objective:   Physical Exam  Constitutional:       General: He is not in acute distress. Neck:      Musculoskeletal: Neck supple. Thyroid: No thyromegaly. Vascular: No carotid bruit. Cardiovascular:      Rate and Rhythm: Normal rate and regular rhythm.    Pulmonary:      Effort: Pulmonary effort is normal. Breath sounds: Normal breath sounds. No wheezing or rales. Musculoskeletal:      Lumbar back: He exhibits decreased range of motion. He exhibits no tenderness and no bony tenderness. Lymphadenopathy:      Cervical: No cervical adenopathy. Skin:     General: Skin is warm and dry. Neurological:      Mental Status: He is alert and oriented to person, place, and time. Psychiatric:         Behavior: Behavior normal.         Thought Content: Thought content normal.         Judgment: Judgment normal.         Assessment/Plan:    Mel Butler was seen today for back pain. Diagnoses and all orders for this visit:    Chronic left-sided low back pain with left-sided sciatica/Lumbar radicular pain  -     oxyCODONE-acetaminophen (PERCOCET) 5-325 MG per tablet; Take 1 tablet by mouth every 6 hours as needed for Pain for up to 14 days.  -     MRI LUMBAR SPINE [address]; Future  -     predniSONE (DELTASONE) 10 MG tablet; Take 4 tabs by mouth x 3d, 3 tabs x 3d, 2 tabs x3d, 1 tab xs 3d.   Consider PT/Return to Neurosurgeon  OARRS report reviewed and no inconsistencies noted   The patient understands the risks of dependency/addiction with Percocet and will take as little as possible and discontinue as soon as possible   Return 2 weeks  Anxiety  OARRS report reviewed and no inconsistencies noted   The patient understands the risks of dependency/addiction with Xanax and will take as little as possible and discontinue as soon as possible     Essential hypertension  Increase BP in office today  Return 2 weeks       Grupo Devries MD

## 2020-10-13 ENCOUNTER — TELEPHONE (OUTPATIENT)
Dept: FAMILY MEDICINE CLINIC | Age: 60
End: 2020-10-13

## 2020-10-13 NOTE — TELEPHONE ENCOUNTER
Pt wants to talk to dada, pt is scheduled for an MRI on sat 17th at [facility], he has workers comp. Does he need to do anything to get this approved.  Please call him back

## 2020-10-14 NOTE — TELEPHONE ENCOUNTER
I called and spoke to pt and he is needing a PA on this. . I will try and start this today. . If not I will have inder help me out tomorrow. Kristi Rick

## 2020-10-15 RX ORDER — ALPRAZOLAM 1 MG/1
TABLET ORAL
Qty: 90 TABLET | Refills: 0 | Status: SHIPPED | OUTPATIENT
Start: 2020-10-15 | End: 2020-11-16

## 2020-10-15 NOTE — TELEPHONE ENCOUNTER
Medication:   Requested Prescriptions     Pending Prescriptions Disp Refills    ALPRAZolam (XANAX) 1 MG tablet [Pharmacy Med Name: ALPRAZolam Oral Tablet 1 MG] 90 tablet 0     Sig: TAKE 1 TABLET BY MOUTH THREE TIMES A DAY        Last Filled:  9/14/20 #90, 0 RF     Patient Phone Number: [phone] (home)     Last appt: 10/8/2020 back pain   Next appt: 10/22/2020    Last OARRS:   RX Monitoring 10/8/2020   Attestation -   Periodic Controlled Substance Monitoring No signs of potential drug abuse or diversion identified.

## 2020-10-15 NOTE — TELEPHONE ENCOUNTER
Pt notified that form has to be filled out and faxed in along with last office note and could take up to 72hrs for approval. Pt would like notification regarding whether or not the MRI gets approved

## 2020-10-20 ENCOUNTER — TELEPHONE (OUTPATIENT)
Dept: FAMILY MEDICINE CLINIC | Age: 60
End: 2020-10-20

## 2020-10-20 NOTE — TELEPHONE ENCOUNTER
----- Message from Rufino Villa sent at 15/95/3468 11:55 AM EDT -----  Subject: Message to Provider    QUESTIONS  Information for Provider? Pt is requesting a call back from Dr Matthew Ruelas   daughter in [facility] counselor is requesting to speak with the   doctor   Pt would like to speak the doctor first   ---------------------------------------------------------------------------  --------------  CALL BACK INFO  What is the best way for the office to contact you? OK to leave message on   voicemail  Preferred Call Back Phone Number? [phone]  ---------------------------------------------------------------------------  --------------  SCRIPT ANSWERS  Relationship to Patient?  Self

## 2020-10-22 ENCOUNTER — OFFICE VISIT (OUTPATIENT)
Dept: FAMILY MEDICINE CLINIC | Age: 60
End: 2020-10-22
Payer: COMMERCIAL

## 2020-10-22 VITALS
HEART RATE: 91 BPM | DIASTOLIC BLOOD PRESSURE: 86 MMHG | WEIGHT: 225.8 LBS | OXYGEN SATURATION: 97 % | SYSTOLIC BLOOD PRESSURE: 132 MMHG | BODY MASS INDEX: 32.4 KG/M2

## 2020-10-22 PROCEDURE — G8484 FLU IMMUNIZE NO ADMIN: HCPCS | Performed by: FAMILY MEDICINE

## 2020-10-22 PROCEDURE — G8417 CALC BMI ABV UP PARAM F/U: HCPCS | Performed by: FAMILY MEDICINE

## 2020-10-22 PROCEDURE — 4004F PT TOBACCO SCREEN RCVD TLK: CPT | Performed by: FAMILY MEDICINE

## 2020-10-22 PROCEDURE — 99213 OFFICE O/P EST LOW 20 MIN: CPT | Performed by: FAMILY MEDICINE

## 2020-10-22 PROCEDURE — G8427 DOCREV CUR MEDS BY ELIG CLIN: HCPCS | Performed by: FAMILY MEDICINE

## 2020-10-22 PROCEDURE — 3017F COLORECTAL CA SCREEN DOC REV: CPT | Performed by: FAMILY MEDICINE

## 2020-10-22 ASSESSMENT — ENCOUNTER SYMPTOMS
RESPIRATORY NEGATIVE: 1
BACK PAIN: 1
GASTROINTESTINAL NEGATIVE: 1

## 2020-10-22 NOTE — PROGRESS NOTES
Subjective:      Patient ID: Tete Rosa is a 64 y.o. male. HPI  Patient returns for a 2-week follow-up for back pain treated with prednisone as well as his oxycodone and also ordering an MRI which he only received approval for yesterday. Prednisone helped. Pain is better and not an 8-10 anymore. Review of Systems   Constitutional: Positive for activity change (some improvement). HENT: Negative. Respiratory: Negative. Cardiovascular: Negative. Gastrointestinal: Negative. Genitourinary: Negative. Musculoskeletal: Positive for back pain. Neurological: Negative. Psychiatric/Behavioral: Positive for dysphoric mood. The patient is nervous/anxious. Objective:   Physical Exam  Constitutional:       General: He is not in acute distress. Musculoskeletal:      Lumbar back: He exhibits decreased range of motion. He exhibits no tenderness. Skin:     General: Skin is warm and dry. Neurological:      Mental Status: He is alert and oriented to person, place, and time. Psychiatric:         Behavior: Behavior normal.         Thought Content: Thought content normal.         Judgment: Judgment normal.       Assessment/Plan:    Olen Schwab was seen today for back pain, medication check and depression.     Diagnoses and all orders for this visit:    Chronic left-sided low back pain with left-sided sciatica/Lumbar radicular pain  The patient will get an MRI scheduled  Reduce Oxycodone with pain being better  Consider PT pending MRI  Return for routine check up at appropriate time  OARRS report reviewed and no inconsistencies noted          Luz Ribeiro MD

## 2020-10-27 ENCOUNTER — TELEPHONE (OUTPATIENT)
Dept: FAMILY MEDICINE CLINIC | Age: 60
End: 2020-10-27

## 2020-10-27 NOTE — TELEPHONE ENCOUNTER
GAURAV for Virginie Parikh/[facility] to call office regarding pt's MRI Lumbar wo contrast. Linwoodoumou Abby has returned my call states they are showing the MRI has not been approved. Homer Turner know it was approved and have faxed over approval letter to attn; Medhat Bennett.  Pt has been notified and will call [facility] scheduling to R/S as they had cancelled his appt

## 2020-10-30 ENCOUNTER — TELEPHONE (OUTPATIENT)
Dept: FAMILY MEDICINE CLINIC | Age: 60
End: 2020-10-30

## 2020-10-30 ENCOUNTER — HOSPITAL ENCOUNTER (OUTPATIENT)
Dept: MRI IMAGING | Age: 60
Discharge: HOME | End: 2020-10-30
Payer: COMMERCIAL

## 2020-10-30 PROCEDURE — 72148 MRI LUMBAR SPINE W/O DYE: CPT

## 2020-10-30 RX ORDER — OXYCODONE HYDROCHLORIDE AND ACETAMINOPHEN 5; 325 MG/1; MG/1
1 TABLET ORAL EVERY 6 HOURS PRN
Qty: 60 TABLET | Refills: 0 | Status: CANCELLED | OUTPATIENT
Start: 2020-10-30 | End: 2020-11-13

## 2020-10-30 RX ORDER — OXYCODONE HYDROCHLORIDE AND ACETAMINOPHEN 5; 325 MG/1; MG/1
1 TABLET ORAL EVERY 12 HOURS PRN
Qty: 60 TABLET | Refills: 0 | Status: SHIPPED | OUTPATIENT
Start: 2020-10-30 | End: 2020-11-24 | Stop reason: SDUPTHER

## 2020-10-30 NOTE — TELEPHONE ENCOUNTER
----- Message from Greyson Walsh sent at 10/30/2020  3:13 PM EDT -----  Subject: Message to Provider    QUESTIONS  Information for Provider? patient would like to talk about MRI results   would like to come in if needed   and says he's in need of refill of 5mg oxycodone Percocet (did not see on   his medication list)  ---------------------------------------------------------------------------  --------------  CALL BACK INFO  What is the best way for the office to contact you? OK to leave message on   voicemail  Preferred Call Back Phone Number? [phone]  ---------------------------------------------------------------------------  --------------  SCRIPT ANSWERS  Relationship to Patient?  Self

## 2020-10-30 NOTE — TELEPHONE ENCOUNTER
oxyCODONE-acetaminophen (PERCOCET) 5-325 MG per tablet - qty 60        Last Visit 10/22/20 - Dr Norman Even 10/08/2020,  360, 0RF      Pharmacy:  [facility]

## 2020-10-30 NOTE — TELEPHONE ENCOUNTER
I called and spoke to pt and he really would like dr Teo Boyle to give him a call about his MRI results. . He is not really sure what is going on and is concerned about the results.      LOV 10/22/20  LRF 60 0 10/8/20

## 2020-11-16 RX ORDER — ALPRAZOLAM 1 MG/1
TABLET ORAL
Qty: 90 TABLET | Refills: 0 | Status: SHIPPED | OUTPATIENT
Start: 2020-11-16 | End: 2020-12-14

## 2020-11-23 RX ORDER — OMEPRAZOLE 20 MG/1
CAPSULE, DELAYED RELEASE ORAL
Qty: 90 CAPSULE | Refills: 3 | Status: SHIPPED | OUTPATIENT
Start: 2020-11-23 | End: 2021-12-28

## 2020-11-23 NOTE — TELEPHONE ENCOUNTER
Medication:   Requested Prescriptions     Pending Prescriptions Disp Refills    omeprazole (PRILOSEC) 20 MG delayed release capsule [Pharmacy Med Name: Omeprazole Oral Capsule Delayed Release 20 MG] 30 capsule 0     Sig: TAKE 1 CAPSULE BY MOUTH ONE TIME A DAY WITH BREAKFAST        Last Filled:  8/20/20 #30, 3 RF     Patient Phone Number: [phone] (home)     Last appt: 10/22/2020 back pain, med check   Next appt: Visit date not found

## 2020-11-24 RX ORDER — OXYCODONE HYDROCHLORIDE AND ACETAMINOPHEN 5; 325 MG/1; MG/1
1 TABLET ORAL EVERY 12 HOURS PRN
Qty: 60 TABLET | Refills: 0 | Status: SHIPPED | OUTPATIENT
Start: 2020-11-24 | End: 2020-12-28 | Stop reason: SDUPTHER

## 2020-11-24 NOTE — TELEPHONE ENCOUNTER
Medication and Quantity requested: percocet  5/325 #60    Last Visit  10.22.20    Pharmacy and phone number updated in [facility]:  yes

## 2020-12-04 ENCOUNTER — TELEPHONE (OUTPATIENT)
Dept: FAMILY MEDICINE CLINIC | Age: 60
End: 2020-12-04

## 2020-12-04 NOTE — TELEPHONE ENCOUNTER
----- Message from Cindi Diane sent at 12/4/2020 11:43 AM EST -----  Subject: Message to Provider    QUESTIONS  Information for Provider? Pt would like to know if something sooner comes   available before his 12/21 appointment to be notified.  ---------------------------------------------------------------------------  --------------  [address]  What is the best way for the office to contact you? OK to leave message on   voicemail  Preferred Call Back Phone Number? [phone]  ---------------------------------------------------------------------------  --------------  SCRIPT ANSWERS  Relationship to Patient?  Self

## 2020-12-10 ENCOUNTER — TELEPHONE (OUTPATIENT)
Dept: FAMILY MEDICINE CLINIC | Age: 60
End: 2020-12-10

## 2020-12-10 NOTE — TELEPHONE ENCOUNTER
Diana Bill from [employer] called wanting to know if the patient  has made an apt for going through his MRI results . Please call [phone].

## 2020-12-14 RX ORDER — ALPRAZOLAM 1 MG/1
TABLET ORAL
Qty: 90 TABLET | Refills: 0 | Status: SHIPPED | OUTPATIENT
Start: 2020-12-14 | End: 2021-01-18

## 2020-12-14 NOTE — TELEPHONE ENCOUNTER
Medication:   Requested Prescriptions     Pending Prescriptions Disp Refills    ALPRAZolam (XANAX) 1 MG tablet [Pharmacy Med Name: ALPRAZolam Oral Tablet 1 MG] 90 tablet 0     Sig: TAKE 1 TABLET BY MOUTH THREE TIMES A DAY        Last Filled:  11/16/20 #90, 0 RF     Patient Phone Number: [phone] (home)     Last appt: 10/22/2020 back pain   Next appt: Visit date not found    Last OARRS:   RX Monitoring 10/22/2020   Attestation -   Periodic Controlled Substance Monitoring No signs of potential drug abuse or diversion identified.

## 2020-12-17 ENCOUNTER — OFFICE VISIT (OUTPATIENT)
Dept: PRIMARY CARE CLINIC | Age: 60
End: 2020-12-17
Payer: MEDICARE

## 2020-12-17 PROCEDURE — G8417 CALC BMI ABV UP PARAM F/U: HCPCS | Performed by: NURSE PRACTITIONER

## 2020-12-17 PROCEDURE — 99211 OFF/OP EST MAY X REQ PHY/QHP: CPT | Performed by: NURSE PRACTITIONER

## 2020-12-17 PROCEDURE — G8428 CUR MEDS NOT DOCUMENT: HCPCS | Performed by: NURSE PRACTITIONER

## 2020-12-17 NOTE — PROGRESS NOTES
Warren Mccarty received a viral test for COVID-19. They were educated on isolation and quarantine as appropriate. For any symptoms, they were directed to seek care from their PCP, given contact information to establish with a doctor, directed to an urgent care or the emergency room.

## 2020-12-17 NOTE — PATIENT INSTRUCTIONS

## 2020-12-18 LAB — SARS-COV-2, NAA: NOT DETECTED

## 2020-12-28 RX ORDER — OXYCODONE HYDROCHLORIDE AND ACETAMINOPHEN 5; 325 MG/1; MG/1
1 TABLET ORAL EVERY 12 HOURS PRN
Qty: 60 TABLET | Refills: 0 | Status: SHIPPED | OUTPATIENT
Start: 2020-12-28 | End: 2021-01-25 | Stop reason: SDUPTHER

## 2020-12-28 NOTE — TELEPHONE ENCOUNTER
Medication and Quantity requested: oxyCODONE-acetaminophen (PERCOCET) 5-325 MG per tablet          Last Visit  10/22/20    Pharmacy and phone number updated in [facility]:  Yes [facility]

## 2021-01-18 DIAGNOSIS — F41.9 ANXIETY: ICD-10-CM

## 2021-01-18 RX ORDER — ALPRAZOLAM 1 MG/1
TABLET ORAL
Qty: 90 TABLET | Refills: 0 | Status: SHIPPED | OUTPATIENT
Start: 2021-01-18 | End: 2021-02-16

## 2021-01-25 ENCOUNTER — OFFICE VISIT (OUTPATIENT)
Dept: FAMILY MEDICINE CLINIC | Age: 61
End: 2021-01-25
Payer: MEDICARE

## 2021-01-25 ENCOUNTER — TELEPHONE (OUTPATIENT)
Dept: FAMILY MEDICINE CLINIC | Age: 61
End: 2021-01-25

## 2021-01-25 VITALS
DIASTOLIC BLOOD PRESSURE: 78 MMHG | OXYGEN SATURATION: 96 % | BODY MASS INDEX: 31.42 KG/M2 | HEART RATE: 94 BPM | WEIGHT: 219 LBS | SYSTOLIC BLOOD PRESSURE: 134 MMHG

## 2021-01-25 DIAGNOSIS — Z72.0 TOBACCO ABUSE: ICD-10-CM

## 2021-01-25 DIAGNOSIS — F33.1 MODERATE EPISODE OF RECURRENT MAJOR DEPRESSIVE DISORDER (HCC): ICD-10-CM

## 2021-01-25 DIAGNOSIS — Z80.1 FH: LUNG CANCER: ICD-10-CM

## 2021-01-25 DIAGNOSIS — G89.29 CHRONIC LEFT-SIDED LOW BACK PAIN WITH LEFT-SIDED SCIATICA: ICD-10-CM

## 2021-01-25 DIAGNOSIS — M54.42 CHRONIC LEFT-SIDED LOW BACK PAIN WITH LEFT-SIDED SCIATICA: ICD-10-CM

## 2021-01-25 DIAGNOSIS — F41.9 ANXIETY: Primary | ICD-10-CM

## 2021-01-25 DIAGNOSIS — I10 ESSENTIAL HYPERTENSION: ICD-10-CM

## 2021-01-25 PROCEDURE — G8427 DOCREV CUR MEDS BY ELIG CLIN: HCPCS | Performed by: FAMILY MEDICINE

## 2021-01-25 PROCEDURE — 4004F PT TOBACCO SCREEN RCVD TLK: CPT | Performed by: FAMILY MEDICINE

## 2021-01-25 PROCEDURE — G8417 CALC BMI ABV UP PARAM F/U: HCPCS | Performed by: FAMILY MEDICINE

## 2021-01-25 PROCEDURE — 3017F COLORECTAL CA SCREEN DOC REV: CPT | Performed by: FAMILY MEDICINE

## 2021-01-25 PROCEDURE — G8484 FLU IMMUNIZE NO ADMIN: HCPCS | Performed by: FAMILY MEDICINE

## 2021-01-25 PROCEDURE — 99214 OFFICE O/P EST MOD 30 MIN: CPT | Performed by: FAMILY MEDICINE

## 2021-01-25 RX ORDER — OXYCODONE HYDROCHLORIDE AND ACETAMINOPHEN 5; 325 MG/1; MG/1
1 TABLET ORAL EVERY 8 HOURS PRN
Qty: 90 TABLET | Refills: 0 | Status: SHIPPED | OUTPATIENT
Start: 2021-01-25 | End: 2021-03-01

## 2021-01-25 ASSESSMENT — ENCOUNTER SYMPTOMS
GASTROINTESTINAL NEGATIVE: 1
BACK PAIN: 1
RESPIRATORY NEGATIVE: 1

## 2021-01-25 NOTE — PROGRESS NOTES
Jayla Rodriguez (:  1960) is a 64 y.o. male,Established patient, here for evaluation of the following chief complaint(s):  Follow-up (back, discuss moms medication) and Depression      Assessment/Plan:      Anxiety  Patient states with the impending death of his mother his anxiety level has been increased. OARRS report reviewed and no inconsistencies noted   The patient understands the risks of dependency/addiction with alprazolam and will take as little as possible and discontinue as soon as possible    Chronic left-sided low back pain with left-sided sciatica  -     oxyCODONE-acetaminophen (PERCOCET) 5-325 MG per tablet; Take 1 tablet by mouth every 8 hours as needed for Pain for up to 30 days. Patient states his pain level has increased due to him being his primary caregiver for his mother. Until her death he would like to increase his oxycodone from twice daily to 3 times daily. OARRS report reviewed and no inconsistencies noted   The patient understands the risks of dependency/addiction with oxycodone and will take as little as possible and discontinue as soon as possible      Essential hypertension  Reasonably well controlled  Continue current treatment  Home BP checks  Return 3 months     Tobacco abuse.  Lung Cancer  -     CT Lung Screen (Initial or Annual); Future  Smoking cessation discussed with the patient     Moderate episode of recurrent major depressive disorder [facility])  Patient is currently on no specific antidepressant and uses the alprazolam as his antidepressant as well      Return in about 3 months (around 2021) for htn, OARRS. SUBJECTIVE/OBJECTIVE:  HPI  Anxiety/Back Pain/OARRS visit and med check. He continues to need med for anxiety and sleep and back pain. Alprazolam and Oxycodone  Depression: Mother with stage 4 cancer and in Hospice at his house. Not on any medication other than xanax  Hypertension: Not checking BP.  Remains on amlodipine  Tobacco abuse: Continues to smoke 1 ppd      Review of Systems   Constitutional: Negative. Respiratory: Negative. Cardiovascular: Negative. Gastrointestinal: Negative. Endocrine: Negative. Genitourinary: Negative. Musculoskeletal: Positive for back pain. Neurological: Negative. Psychiatric/Behavioral: Positive for dysphoric mood. The patient is nervous/anxious. Physical Exam  Constitutional:       General: He is not in acute distress. Neck:      Musculoskeletal: Neck supple. Thyroid: No thyromegaly. Vascular: No carotid bruit. Cardiovascular:      Rate and Rhythm: Normal rate and regular rhythm. Pulmonary:      Effort: Pulmonary effort is normal.      Breath sounds: Normal breath sounds. No wheezing or rales. Lymphadenopathy:      Cervical: No cervical adenopathy. Skin:     General: Skin is warm and dry. Neurological:      Mental Status: He is alert and oriented to person, place, and time. Psychiatric:         Behavior: Behavior normal.         Thought Content: Thought content normal.         Judgment: Judgment normal.                 An electronic signature was used to authenticate this note.     --Eliud Cordova MD

## 2021-02-08 NOTE — TELEPHONE ENCOUNTER
----- Message from Alana Gutierrez sent at 12/4/2020  1:08 PM EST -----  Subject: Message to Provider    QUESTIONS  Information for Provider? patient called back and would like to take the   Dec 11 appointment I was unable to transfer call we are having phone   issues I did not see the appointment when I tried to reschedule.   ---------------------------------------------------------------------------  --------------  [address]  What is the best way for the office to contact you? OK to leave message on   voicemail  Preferred Call Back Phone Number? [phone]  ---------------------------------------------------------------------------  --------------  SCRIPT ANSWERS  Relationship to Patient?  Self
LVM for pt.  Please schedule him for any time on the 11
Pt states he called in on Saturday and appt was changed by call katarzyna
Negative/Unknown

## 2021-02-16 DIAGNOSIS — F41.9 ANXIETY: ICD-10-CM

## 2021-02-16 RX ORDER — ALPRAZOLAM 1 MG/1
TABLET ORAL
Qty: 90 TABLET | Refills: 0 | Status: SHIPPED | OUTPATIENT
Start: 2021-02-16 | End: 2021-03-17

## 2021-02-16 NOTE — TELEPHONE ENCOUNTER
Medication:   Requested Prescriptions     Pending Prescriptions Disp Refills    ALPRAZolam (XANAX) 1 MG tablet [Pharmacy Med Name: ALPRAZolam Oral Tablet 1 MG] 90 tablet 0     Sig: TAKE 1 TABLET BY MOUTH THREE TIMES A DAY        Last Filled:  01/18/2021 #90 0rf    Patient Phone Number: [phone] (home)     Last appt: 1/25/2021   Next appt: Visit date not found    Last OARRS:   RX Monitoring 1/25/2021   Attestation -   Periodic Controlled Substance Monitoring No signs of potential drug abuse or diversion identified.

## 2021-03-01 DIAGNOSIS — G89.29 CHRONIC LEFT-SIDED LOW BACK PAIN WITH LEFT-SIDED SCIATICA: ICD-10-CM

## 2021-03-01 DIAGNOSIS — M54.42 CHRONIC LEFT-SIDED LOW BACK PAIN WITH LEFT-SIDED SCIATICA: ICD-10-CM

## 2021-03-01 RX ORDER — OXYCODONE HYDROCHLORIDE AND ACETAMINOPHEN 5; 325 MG/1; MG/1
TABLET ORAL
Qty: 90 TABLET | Refills: 0 | Status: SHIPPED | OUTPATIENT
Start: 2021-03-01 | End: 2021-03-31

## 2021-03-16 ENCOUNTER — IMMUNIZATION (OUTPATIENT)
Dept: PRIMARY CARE CLINIC | Age: 61
End: 2021-03-16
Payer: MEDICARE

## 2021-03-16 PROCEDURE — 91300 COVID-19, PFIZER VACCINE 30MCG/0.3ML DOSE: CPT | Performed by: FAMILY MEDICINE

## 2021-03-16 PROCEDURE — 0001A COVID-19, PFIZER VACCINE 30MCG/0.3ML DOSE: CPT | Performed by: FAMILY MEDICINE

## 2021-03-17 DIAGNOSIS — F41.9 ANXIETY: ICD-10-CM

## 2021-03-17 RX ORDER — ALPRAZOLAM 1 MG/1
TABLET ORAL
Qty: 90 TABLET | Refills: 0 | Status: SHIPPED | OUTPATIENT
Start: 2021-03-17 | End: 2021-04-15

## 2021-03-17 NOTE — TELEPHONE ENCOUNTER
Medication:   Requested Prescriptions     Pending Prescriptions Disp Refills    ALPRAZolam (XANAX) 1 MG tablet [Pharmacy Med Name: ALPRAZolam Oral Tablet 1 MG] 90 tablet 0     Sig: TAKE 1 TABLET BY MOUTH THREE TIMES A DAY        Last Filled:  02/16/2021 #90 0rf    Patient Phone Number: [phone] (home)     Last appt: 1/25/2021   Next appt: Visit date not found    Last OARRS:   RX Monitoring 1/25/2021   Attestation -   Periodic Controlled Substance Monitoring No signs of potential drug abuse or diversion identified.

## 2021-03-31 DIAGNOSIS — G89.29 CHRONIC LEFT-SIDED LOW BACK PAIN WITH LEFT-SIDED SCIATICA: ICD-10-CM

## 2021-03-31 DIAGNOSIS — M54.42 CHRONIC LEFT-SIDED LOW BACK PAIN WITH LEFT-SIDED SCIATICA: ICD-10-CM

## 2021-03-31 RX ORDER — OXYCODONE HYDROCHLORIDE AND ACETAMINOPHEN 5; 325 MG/1; MG/1
TABLET ORAL
Qty: 90 TABLET | Refills: 0 | Status: SHIPPED | OUTPATIENT
Start: 2021-03-31 | End: 2021-04-27 | Stop reason: SDUPTHER

## 2021-04-06 ENCOUNTER — IMMUNIZATION (OUTPATIENT)
Dept: PRIMARY CARE CLINIC | Age: 61
End: 2021-04-06
Payer: MEDICARE

## 2021-04-06 PROCEDURE — 0002A COVID-19, PFIZER VACCINE 30MCG/0.3ML DOSE: CPT | Performed by: FAMILY MEDICINE

## 2021-04-06 PROCEDURE — 91300 COVID-19, PFIZER VACCINE 30MCG/0.3ML DOSE: CPT | Performed by: FAMILY MEDICINE

## 2021-04-13 ENCOUNTER — TELEPHONE (OUTPATIENT)
Dept: CASE MANAGEMENT | Age: 61
End: 2021-04-13

## 2021-04-15 ENCOUNTER — HOSPITAL ENCOUNTER (OUTPATIENT)
Dept: CT IMAGING | Age: 61
Discharge: HOME | End: 2021-04-15
Payer: MEDICARE

## 2021-04-15 DIAGNOSIS — F41.9 ANXIETY: ICD-10-CM

## 2021-04-15 DIAGNOSIS — Z72.0 TOBACCO USE: ICD-10-CM

## 2021-04-15 DIAGNOSIS — Z80.1 FH: LUNG CANCER: ICD-10-CM

## 2021-04-15 PROCEDURE — 71271 CT THORAX LUNG CANCER SCR C-: CPT

## 2021-04-15 RX ORDER — ALPRAZOLAM 1 MG/1
TABLET ORAL
Qty: 90 TABLET | Refills: 0 | Status: SHIPPED | OUTPATIENT
Start: 2021-04-15 | End: 2021-05-28 | Stop reason: SDUPTHER

## 2021-04-20 NOTE — TELEPHONE ENCOUNTER
Chart reviewed including smoking history and recent lung scan results. Will send smoking cessation information and resources. Will follow in the Lung Nodule Program. Invitation to smoking cessation class in July.

## 2021-04-27 ENCOUNTER — VIRTUAL VISIT (OUTPATIENT)
Dept: FAMILY MEDICINE CLINIC | Age: 61
End: 2021-04-27
Payer: COMMERCIAL

## 2021-04-27 ENCOUNTER — TELEPHONE (OUTPATIENT)
Dept: FAMILY MEDICINE CLINIC | Age: 61
End: 2021-04-27

## 2021-04-27 DIAGNOSIS — M54.42 CHRONIC LEFT-SIDED LOW BACK PAIN WITH LEFT-SIDED SCIATICA: ICD-10-CM

## 2021-04-27 DIAGNOSIS — I10 ESSENTIAL HYPERTENSION: Primary | ICD-10-CM

## 2021-04-27 DIAGNOSIS — G89.29 CHRONIC LEFT-SIDED LOW BACK PAIN WITH LEFT-SIDED SCIATICA: ICD-10-CM

## 2021-04-27 DIAGNOSIS — J43.9 PULMONARY EMPHYSEMA, UNSPECIFIED EMPHYSEMA TYPE (HCC): ICD-10-CM

## 2021-04-27 PROCEDURE — 99214 OFFICE O/P EST MOD 30 MIN: CPT | Performed by: FAMILY MEDICINE

## 2021-04-27 RX ORDER — OXYCODONE HYDROCHLORIDE AND ACETAMINOPHEN 5; 325 MG/1; MG/1
1 TABLET ORAL EVERY 8 HOURS PRN
Qty: 90 TABLET | Refills: 0 | Status: SHIPPED | OUTPATIENT
Start: 2021-04-27 | End: 2021-05-28 | Stop reason: SDUPTHER

## 2021-04-27 ASSESSMENT — ENCOUNTER SYMPTOMS
GASTROINTESTINAL NEGATIVE: 1
BACK PAIN: 1
RESPIRATORY NEGATIVE: 1

## 2021-04-27 NOTE — PROGRESS NOTES
2021    TELEHEALTH EVALUATION -- Audio/Visual (During MMSLQ-44 public health emergency)    HPI:    Giovanna Bland (:  1960) has requested an audio/video evaluation for the following concern(s):    Below are the [employer] Comp. issues that we addressed at this visit. Anxiety/Back Pain/OARRS visit and med check. He continues to need med for anxiety and sleep and back pain. Alprazolam and Oxycodone. He ould like to start tapering his Xanax and get off of it. Depression: Mother with stage 4 cancer and  in March. Not on any medication other than xanax. He considered calling for an antidepressant however thinks he is handling his grief fairly well and does not feel like he needs an antidepressant at this time. Anxiety  He will begin to taper his Xanax by taking 1 tablet twice daily for 1 month 1/2 tablet twice daily for the second month and 1/2 tablet daily until he sees me in 3 months. OARRS report reviewed and no inconsistencies noted   The patient understands the risks of dependency/addiction with alprazolam and will take as little as possible and discontinue as soon as possible     Chronic left-sided low back pain with left-sided sciatica  -     oxyCODONE-acetaminophen (PERCOCET) 5-325 MG per tablet; Take 1 tablet by mouth every 8 hours as needed for Pain for up to 30 days. He states he continues to need 3 tablets/day for the last several months since he was taking care of his mother however states his back still hurts as much and he continues to need to 3 tablets. OARRS report reviewed and no inconsistencies noted   The patient understands the risks of dependency/addiction with oxycodone and will take as little as possible and discontinue as soon as possible      Below diagnosis were addressed with the patient at the time of his visit but were not included in his Workmen's Comp. billing    Hypertension: He continues to not check BP.   He remains on amlodipine and states he is compliant with taking this. At the last visit his blood pressure is reasonably well controlled and presumably remains so. He was advised to have an office visit for blood pressure check in 3 months rather than a virtual visit. Continue current treatment  Home BP checks  Return 3 months       Tobacco abuse: He states he quit cigts yesterday but also states he has quit for 10 days at a time in the past and then gone back to smoking. We discussed remaining off cigarettes for greater than the 10 days he is made in the past and taking it 1 day at a time.  -     CT Lung Screen: 4/14/21:  Impression   Emphysema with a few tiny pulmonary nodules in the right upper lobe       LUNG RADS:   Per ACR Lung-RADS Version 1.1       Category 2, Benign appearance or behavior.       Management:  Continue annual lung screening with LDCT in 12 months. Review of Systems   Constitutional: Negative. HENT: Negative. Respiratory: Negative. Cardiovascular: Negative. Gastrointestinal: Negative. Endocrine: Negative. Genitourinary: Negative. Musculoskeletal: Positive for back pain. Neurological: Negative. Psychiatric/Behavioral: Positive for dysphoric mood. The patient is nervous/anxious. Prior to Visit Medications    Medication Sig Taking? Authorizing Provider   ALPRAZolam Norris Mix) 1 MG tablet TAKE 1 TABLET BY MOUTH THREE TIMES A DAY  Yes Awais Pagan MD   oxyCODONE-acetaminophen (PERCOCET) 5-325 MG per tablet TAKE 1 TABLET BY MOUTH EVERY EIGHT HOURS AS NEEDED FOR PAIN FOR UP TO 30 DAYS. REDUCE DOSES TAKEN AS PAIN BECOMES MANAGEABLE.  Yes Awais Pagan MD   omeprazole (PRILOSEC) 20 MG delayed release capsule TAKE 1 CAPSULE BY MOUTH ONE TIME A DAY WITH BREAKFAST Yes Awais Pagan MD   atorvastatin (LIPITOR) 10 MG tablet TAKE 1 TABLET BY MOUTH ONE TIME A DAY Yes Awais Pagna MD   amLODIPine (NORVASC) 5 MG tablet TAKE 1 TABLET BY MOUTH ONE TIME A DAY Yes Awais Pagan MD       Social History     Tobacco Use    Smoking status: Current Every Day Smoker     Packs/day: 1.00     Years: 43.00     Pack years: 43.00     Types: Cigarettes     Start date: 1/1/1977    Smokeless tobacco: Never Used   Substance Use Topics    Alcohol use: Yes     Alcohol/week: 1.0 - 2.0 standard drinks     Types: 1 - 2 Shots of liquor per week     Comment: former heavy use; recovering alcoholic since 8100, approx    Drug use: No        Allergies   Allergen Reactions    Nucynta [Tapentadol] Anaphylaxis     Hallucinations. Went to hospital.   Alexandra Robert Vicodin [Hydrocodone-Acetaminophen]      Itching      Codeine Rash     itching    Tylenol [Acetaminophen] Rash     Itching, pt states he is able to take   ,   Past Medical History:   Diagnosis Date    Alcoholism ([facility] [address] 07/14/1999    Anxiety     Back pain     Hyperlipidemia     Low HDL (under 40)     Prolonged emergence from general anesthesia     \"hard time breathing when waking up'    Spinal arthritis 2005   ,   Past Surgical History:   Procedure Laterality Date    COLONOSCOPY  10/18/12    SPINAL FUSION  10/2006    SPINE SURGERY  2008    l5-s1 hardware removal    TYMPANOSTOMY TUBE PLACEMENT Bilateral 04/09/2018    LEFT PRESSURE EQUALIZATION TUBE INSERTION AND EXAM UNDER ANESTHESIA OF NASOPHARYNX    VASECTOMY  01/11/2019   ,   Social History     Tobacco Use    Smoking status: Current Every Day Smoker     Packs/day: 1.00     Years: 43.00     Pack years: 43.00     Types: Cigarettes     Start date: 1/1/1977    Smokeless tobacco: Never Used   Substance Use Topics    Alcohol use:  Yes     Alcohol/week: 1.0 - 2.0 standard drinks     Types: 1 - 2 Shots of liquor per week     Comment: former heavy use; recovering alcoholic since 4159, approx    Drug use: No   ,   Family History   Problem Relation Age of Onset    Heart Disease Mother     High Blood Pressure Mother     Cancer Mother         female   ,   Immunization History   Administered Date(s) Administered    COVID-19, Pfizer, PF, 30mcg/0.3mL 03/16/2021, 04/06/2021   ,   Health Maintenance   Topic Date Due    Pneumococcal 0-64 years Vaccine (1 of 1 - PPSV23) Never done    DTaP/Tdap/Td vaccine (1 - Tdap) Never done    Shingles Vaccine (1 of 2) Never done   [employer] Visit (AWV)  Never done    Flu vaccine (Season Ended) 10/07/2021 (Originally 9/1/2021)    A1C test (Diabetic or Prediabetic)  08/06/2021    Lipid screen  08/06/2021    Potassium monitoring  08/06/2021    Creatinine monitoring  08/06/2021    Low dose CT lung screening  04/15/2022    Colon cancer screen colonoscopy  10/18/2022    COVID-19 Vaccine  Completed    HIV screen  Completed    Hepatitis A vaccine  Aged Out    Hepatitis B vaccine  Aged Out    Hib vaccine  Aged Out    Meningococcal (ACWY) vaccine  Aged Out    Hepatitis C screen  Discontinued       PHYSICAL EXAMINATION:  [ INSTRUCTIONS:  \"[x]\" Indicates a positive item  \"[]\" Indicates a negative item  -- DELETE ALL ITEMS NOT EXAMINED]  Vital Signs: (As obtained by patient/caregiver or practitioner observation)    Respiratory ratenormal         Constitutional: [x] Appears well-developed and well-nourished [x] No apparent distress      [] Abnormal-   Mental status  [x] Alert and awake  [x] Oriented to person/place/time [x]Able to follow commands      Eyes:  EOM    [x]  Normal  [] Abnormal-  Sclera  [x]  Normal  [] Abnormal -         Discharge [x]  None visible  [] Abnormal -    HENT:   [x] Normocephalic, atraumatic.   [] Abnormal   [] Mouth/Throat: Mucous membranes are moist.     External Ears [x] Normal  [] Abnormal-     Neck: [x] No visualized mass     Pulmonary/Chest: [x] Respiratory effort normal.  [] No visualized signs of difficulty breathing or respiratory distress        [] Abnormal-      Musculoskeletal:   [] Normal gait with no signs of ataxia         [x] Normal range of motion of neck        [] Abnormal-       Neurological:        [x] No Facial Asymmetry (Cranial nerve 7 motor function) (limited exam to

## 2021-04-27 NOTE — TELEPHONE ENCOUNTER
Tried to contact pt, he had an appt at 10 did he want to do a vv if so please switch the appt to vv and Dr Zoila Kim will do the appt as soon as he can

## 2021-05-28 ENCOUNTER — OFFICE VISIT (OUTPATIENT)
Dept: FAMILY MEDICINE CLINIC | Age: 61
End: 2021-05-28
Payer: COMMERCIAL

## 2021-05-28 ENCOUNTER — TELEPHONE (OUTPATIENT)
Dept: FAMILY MEDICINE CLINIC | Age: 61
End: 2021-05-28

## 2021-05-28 VITALS
SYSTOLIC BLOOD PRESSURE: 136 MMHG | HEART RATE: 74 BPM | DIASTOLIC BLOOD PRESSURE: 86 MMHG | WEIGHT: 210.6 LBS | BODY MASS INDEX: 30.22 KG/M2 | OXYGEN SATURATION: 96 %

## 2021-05-28 DIAGNOSIS — F41.9 ANXIETY: ICD-10-CM

## 2021-05-28 DIAGNOSIS — G89.29 CHRONIC LEFT-SIDED LOW BACK PAIN WITH LEFT-SIDED SCIATICA: ICD-10-CM

## 2021-05-28 DIAGNOSIS — M54.16 LUMBAR RADICULAR PAIN: Primary | ICD-10-CM

## 2021-05-28 DIAGNOSIS — F33.41 RECURRENT MAJOR DEPRESSIVE DISORDER, IN PARTIAL REMISSION (HCC): ICD-10-CM

## 2021-05-28 DIAGNOSIS — M54.42 CHRONIC LEFT-SIDED LOW BACK PAIN WITH LEFT-SIDED SCIATICA: ICD-10-CM

## 2021-05-28 PROCEDURE — 99214 OFFICE O/P EST MOD 30 MIN: CPT | Performed by: FAMILY MEDICINE

## 2021-05-28 PROCEDURE — 3017F COLORECTAL CA SCREEN DOC REV: CPT | Performed by: FAMILY MEDICINE

## 2021-05-28 PROCEDURE — 4004F PT TOBACCO SCREEN RCVD TLK: CPT | Performed by: FAMILY MEDICINE

## 2021-05-28 PROCEDURE — G8427 DOCREV CUR MEDS BY ELIG CLIN: HCPCS | Performed by: FAMILY MEDICINE

## 2021-05-28 PROCEDURE — G8417 CALC BMI ABV UP PARAM F/U: HCPCS | Performed by: FAMILY MEDICINE

## 2021-05-28 RX ORDER — OXYCODONE HYDROCHLORIDE AND ACETAMINOPHEN 5; 325 MG/1; MG/1
1 TABLET ORAL EVERY 8 HOURS PRN
Qty: 42 TABLET | Refills: 0 | Status: SHIPPED | OUTPATIENT
Start: 2021-05-28 | End: 2021-06-11 | Stop reason: SDUPTHER

## 2021-05-28 RX ORDER — ALPRAZOLAM 1 MG/1
TABLET ORAL
Qty: 30 TABLET | Refills: 0 | Status: SHIPPED | OUTPATIENT
Start: 2021-05-28 | End: 2021-06-25

## 2021-05-28 SDOH — ECONOMIC STABILITY: FOOD INSECURITY: WITHIN THE PAST 12 MONTHS, YOU WORRIED THAT YOUR FOOD WOULD RUN OUT BEFORE YOU GOT MONEY TO BUY MORE.: NEVER TRUE

## 2021-05-28 SDOH — ECONOMIC STABILITY: TRANSPORTATION INSECURITY
IN THE PAST 12 MONTHS, HAS THE LACK OF TRANSPORTATION KEPT YOU FROM MEDICAL APPOINTMENTS OR FROM GETTING MEDICATIONS?: NO

## 2021-05-28 ASSESSMENT — ENCOUNTER SYMPTOMS
GASTROINTESTINAL NEGATIVE: 1
RESPIRATORY NEGATIVE: 1
BACK PAIN: 1

## 2021-05-28 ASSESSMENT — SOCIAL DETERMINANTS OF HEALTH (SDOH): HOW HARD IS IT FOR YOU TO PAY FOR THE VERY BASICS LIKE FOOD, HOUSING, MEDICAL CARE, AND HEATING?: NOT HARD AT ALL

## 2021-05-28 NOTE — TELEPHONE ENCOUNTER
[facility] from [address] [place] [address] Gabrielle says pt dosage for Xanax has changed from 1 tablet to 1/2 and wants clarification if this is correct

## 2021-05-28 NOTE — PROGRESS NOTES
Emerald Melchor (:  1960) is a 64 y.o. male,Established patient, here for evaluation of the following chief complaint(s):  Back Pain, Discuss Medications (cut back on xanax, percocet), and Discuss Labs         ASSESSMENT/PLAN:  1. Lumbar radicular pain/4. Chronic left-sided low back pain with left-sided sciatica  -     oxyCODONE-acetaminophen (PERCOCET) 5-325 MG per tablet; Take 1 tablet by mouth every 8 hours as needed for Pain for up to 14 days. , Disp-42 tablet, R-0Normal  OARRS report reviewed and no inconsistencies noted   The patient understands the risks of dependency/addiction with oxycodone and will take as little as possible and discontinue as soon as possible    2. Anxiety  -     ALPRAZolam (XANAX) 1 MG tablet; 1/2 tab tid, Disp-30 tablet, R-0Norma  OARRS report reviewed and no inconsistencies noted   The patient understands the risks of dependency/addiction with alprazolam and will take as little as possible and discontinue as soon as possible     3. Recurrent major depressive disorder, in partial remission [facility])  The patient will see a Psychiatrist also rather than take medication      Return in about 3 months (around 2021) for OARRS. Subjective   SUBJECTIVE/OBJECTIVE:  HPI  Anxiety/Back Pain/OARRS visit and med check. He continues to need med for anxiety and sleep and back pain. Chronic left-sided low back pain with left-sided sciatica  He states he has had issues with alcohol and dependency in the past and would like to get his Rx every 2 weeks instead of monthly to prevent him from overtaking his medication  Depression:states he is still feeling some depression from Mother's death who  at his house while he was taking care of her. He states he just finished wrapping up his mom's estate. He has an appointment with a Psychiatrist coming up soon. States he cried for 2 months off and on but has been better for last 2 months. He states he does not want to take any medication. He was previously on Effexor. Anxiety:   He has 4 xanax left and has weaned down to 1/2 per day and usually an extra 1/2 during the day and at bedtime is the max he takes        Review of Systems   Constitutional: Negative. Respiratory: Negative. Cardiovascular: Negative. Gastrointestinal: Negative. Endocrine: Negative. Genitourinary: Negative. Musculoskeletal: Positive for back pain. Psychiatric/Behavioral: Positive for dysphoric mood. The patient is nervous/anxious. Objective   Physical Exam  Constitutional:       General: He is not in acute distress. Neck:      Thyroid: No thyromegaly. Vascular: No carotid bruit. Cardiovascular:      Rate and Rhythm: Normal rate and regular rhythm. Pulmonary:      Effort: Pulmonary effort is normal.      Breath sounds: Normal breath sounds. No wheezing or rales. Musculoskeletal:      Cervical back: Neck supple. Lumbar back: No tenderness. Decreased range of motion. Lymphadenopathy:      Cervical: No cervical adenopathy. Skin:     General: Skin is warm and dry. Neurological:      Mental Status: He is alert and oriented to person, place, and time. Psychiatric:         Behavior: Behavior normal.         Thought Content: Thought content normal.         Judgment: Judgment normal.                  An electronic signature was used to authenticate this note.     --Marilynn Herrera MD

## 2021-06-11 DIAGNOSIS — M54.42 CHRONIC LEFT-SIDED LOW BACK PAIN WITH LEFT-SIDED SCIATICA: ICD-10-CM

## 2021-06-11 DIAGNOSIS — G89.29 CHRONIC LEFT-SIDED LOW BACK PAIN WITH LEFT-SIDED SCIATICA: ICD-10-CM

## 2021-06-11 RX ORDER — OXYCODONE HYDROCHLORIDE AND ACETAMINOPHEN 5; 325 MG/1; MG/1
1 TABLET ORAL EVERY 8 HOURS PRN
Qty: 42 TABLET | Refills: 0 | Status: SHIPPED | OUTPATIENT
Start: 2021-06-11 | End: 2021-06-25 | Stop reason: SDUPTHER

## 2021-06-11 NOTE — TELEPHONE ENCOUNTER
Medication and Quantity requested: percocet  5/325 #42    Last Visit  05.28.21    Pharmacy and phone number updated in [facility]:  yes

## 2021-06-25 DIAGNOSIS — F41.9 ANXIETY: ICD-10-CM

## 2021-06-25 DIAGNOSIS — G89.29 CHRONIC LEFT-SIDED LOW BACK PAIN WITH LEFT-SIDED SCIATICA: ICD-10-CM

## 2021-06-25 DIAGNOSIS — M54.42 CHRONIC LEFT-SIDED LOW BACK PAIN WITH LEFT-SIDED SCIATICA: ICD-10-CM

## 2021-06-25 RX ORDER — ALPRAZOLAM 1 MG/1
TABLET ORAL
Qty: 30 TABLET | Refills: 0 | Status: SHIPPED | OUTPATIENT
Start: 2021-06-25 | End: 2021-07-15

## 2021-06-25 RX ORDER — OXYCODONE HYDROCHLORIDE AND ACETAMINOPHEN 5; 325 MG/1; MG/1
1 TABLET ORAL EVERY 8 HOURS PRN
Qty: 42 TABLET | Refills: 0 | Status: SHIPPED | OUTPATIENT
Start: 2021-06-25 | End: 2021-07-09 | Stop reason: SDUPTHER

## 2021-06-25 NOTE — TELEPHONE ENCOUNTER
Medication:   Requested Prescriptions     Pending Prescriptions Disp Refills    ALPRAZolam (XANAX) 1 MG tablet [Pharmacy Med Name: ALPRAZolam Oral Tablet 1 MG] 30 tablet 0     Sig: TAKE 1/2 TABLET BY MOUTH THREE TIMES A DAY        Last Filled:  05/28/2021 #30 0rf    Patient Phone Number: [phone] (home)     Last appt: 5/28/2021   Next appt: 6/25/2021    Last OARRS:   RX Monitoring 4/27/2021   Attestation -   Periodic Controlled Substance Monitoring No signs of potential drug abuse or diversion identified.

## 2021-07-09 DIAGNOSIS — M54.42 CHRONIC LEFT-SIDED LOW BACK PAIN WITH LEFT-SIDED SCIATICA: ICD-10-CM

## 2021-07-09 DIAGNOSIS — G89.29 CHRONIC LEFT-SIDED LOW BACK PAIN WITH LEFT-SIDED SCIATICA: ICD-10-CM

## 2021-07-09 RX ORDER — OXYCODONE HYDROCHLORIDE AND ACETAMINOPHEN 5; 325 MG/1; MG/1
1 TABLET ORAL EVERY 8 HOURS PRN
Qty: 42 TABLET | Refills: 0 | Status: SHIPPED | OUTPATIENT
Start: 2021-07-09 | End: 2021-07-21 | Stop reason: SDUPTHER

## 2021-07-09 NOTE — TELEPHONE ENCOUNTER
Medication:   Requested Prescriptions     Pending Prescriptions Disp Refills    oxyCODONE-acetaminophen (PERCOCET) 5-325 MG per tablet 42 tablet 0     Sig: Take 1 tablet by mouth every 8 hours as needed for Pain for up to 14 days. Last Filled:  06/25/2021 #42 0rf     Patient Phone Number: [phone] (home)     Last appt: 5/28/2021   Next appt: Visit date not found    Last OARRS:   RX Monitoring 4/27/2021   Attestation -   Periodic Controlled Substance Monitoring No signs of potential drug abuse or diversion identified.

## 2021-07-15 DIAGNOSIS — F41.9 ANXIETY: ICD-10-CM

## 2021-07-15 RX ORDER — ALPRAZOLAM 1 MG/1
TABLET ORAL
Qty: 30 TABLET | Refills: 0 | Status: SHIPPED | OUTPATIENT
Start: 2021-07-15 | End: 2021-07-21 | Stop reason: SDUPTHER

## 2021-07-21 DIAGNOSIS — G89.29 CHRONIC LEFT-SIDED LOW BACK PAIN WITH LEFT-SIDED SCIATICA: ICD-10-CM

## 2021-07-21 DIAGNOSIS — M54.42 CHRONIC LEFT-SIDED LOW BACK PAIN WITH LEFT-SIDED SCIATICA: ICD-10-CM

## 2021-07-21 DIAGNOSIS — F41.9 ANXIETY: ICD-10-CM

## 2021-07-21 RX ORDER — ALPRAZOLAM 1 MG/1
TABLET ORAL
Qty: 45 TABLET | Refills: 0 | Status: SHIPPED | OUTPATIENT
Start: 2021-07-21 | End: 2021-08-27

## 2021-07-21 RX ORDER — OXYCODONE HYDROCHLORIDE AND ACETAMINOPHEN 5; 325 MG/1; MG/1
1 TABLET ORAL EVERY 8 HOURS PRN
Qty: 42 TABLET | Refills: 0 | Status: SHIPPED | OUTPATIENT
Start: 2021-07-21 | End: 2021-07-21 | Stop reason: SDUPTHER

## 2021-07-21 RX ORDER — ALPRAZOLAM 1 MG/1
TABLET ORAL
Qty: 30 TABLET | Refills: 0 | Status: SHIPPED | OUTPATIENT
Start: 2021-07-21 | End: 2021-07-21 | Stop reason: SDUPTHER

## 2021-07-21 RX ORDER — OXYCODONE HYDROCHLORIDE AND ACETAMINOPHEN 5; 325 MG/1; MG/1
1 TABLET ORAL EVERY 8 HOURS PRN
Qty: 63 TABLET | Refills: 0 | Status: SHIPPED | OUTPATIENT
Start: 2021-07-21 | End: 2021-08-12 | Stop reason: SDUPTHER

## 2021-07-21 NOTE — TELEPHONE ENCOUNTER
Pt called back . He said if he picks up pain meds on Saturday and theres enough pills for 2 weeks he will run out before 8/11 please review.  Claudia Cnode he wants a month supply

## 2021-07-21 NOTE — TELEPHONE ENCOUNTER
Called and spoke with Zenon Valadez with the pharmacy and canceled the first order of those Rx that was sent over earlier today.   Pharmacy received the new Rx's and will fill those for pt

## 2021-07-21 NOTE — TELEPHONE ENCOUNTER
Pt requesting enough medication for 3 weeks due to being out of town.   Rx teed up, please look over and sign

## 2021-07-21 NOTE — TELEPHONE ENCOUNTER
Medication and Quantity requested: Percocet 5-325mg  Xanax 1mg-usually last 20 days  Patient will be out of town 8-5 - 8-11- taking patient's mom's ashes  Patient is asking if the RX can be written for a month due to going out of town     Last Visit  5-28-21, [facility] and phone number updated in EPIC:  Yes,[facility]

## 2021-08-12 DIAGNOSIS — M54.42 CHRONIC LEFT-SIDED LOW BACK PAIN WITH LEFT-SIDED SCIATICA: ICD-10-CM

## 2021-08-12 DIAGNOSIS — G89.29 CHRONIC LEFT-SIDED LOW BACK PAIN WITH LEFT-SIDED SCIATICA: ICD-10-CM

## 2021-08-12 RX ORDER — OXYCODONE HYDROCHLORIDE AND ACETAMINOPHEN 5; 325 MG/1; MG/1
1 TABLET ORAL EVERY 8 HOURS PRN
Qty: 63 TABLET | Refills: 0 | Status: SHIPPED | OUTPATIENT
Start: 2021-08-12 | End: 2021-09-07

## 2021-08-12 NOTE — TELEPHONE ENCOUNTER
Medication:   Requested Prescriptions     Pending Prescriptions Disp Refills    oxyCODONE-acetaminophen (PERCOCET) 5-325 MG per tablet 63 tablet 0     Sig: Take 1 tablet by mouth every 8 hours as needed for Pain for up to 14 days. Last Filled:  07/21/21  Patient Phone Number: [phone] (home)     Last appt: 5/28/2021   Next appt: Visit date not found    Last OARRS:   RX Monitoring 4/27/2021   Attestation -   Periodic Controlled Substance Monitoring No signs of potential drug abuse or diversion identified.

## 2021-08-12 NOTE — TELEPHONE ENCOUNTER
Medication and Quantity requested:  oxyCODONE-acetaminophen (PERCOCET) 5-325 MG per tablet - qty 42        Last Visit  05/28/21 - Dr Andre Silva and phone number updated in [facility]:  Yes    Emy

## 2021-08-27 DIAGNOSIS — F41.9 ANXIETY: ICD-10-CM

## 2021-08-27 RX ORDER — ALPRAZOLAM 1 MG/1
TABLET ORAL
Qty: 45 TABLET | Refills: 0 | Status: SHIPPED | OUTPATIENT
Start: 2021-08-27 | End: 2021-09-20 | Stop reason: SDUPTHER

## 2021-08-27 NOTE — TELEPHONE ENCOUNTER
Medication:   Requested Prescriptions     Pending Prescriptions Disp Refills    ALPRAZolam (XANAX) 1 MG tablet [Pharmacy Med Name: ALPRAZolam Oral Tablet 1 MG] 45 tablet 0     Sig: TAKE 1/2 TABLET BY MOUTH THREE TIMES A DAY        Last Filled:  07/21/2021 #45 0rf    Patient Phone Number: [phone] (home)     Last appt: 5/28/2021   Next appt: Visit date not found    Last OARRS:   RX Monitoring 4/27/2021   Attestation -   Periodic Controlled Substance Monitoring No signs of potential drug abuse or diversion identified.

## 2021-09-06 DIAGNOSIS — M54.42 CHRONIC LEFT-SIDED LOW BACK PAIN WITH LEFT-SIDED SCIATICA: ICD-10-CM

## 2021-09-06 DIAGNOSIS — G89.29 CHRONIC LEFT-SIDED LOW BACK PAIN WITH LEFT-SIDED SCIATICA: ICD-10-CM

## 2021-09-07 RX ORDER — OXYCODONE HYDROCHLORIDE AND ACETAMINOPHEN 5; 325 MG/1; MG/1
TABLET ORAL
Qty: 63 TABLET | Refills: 0 | Status: SHIPPED | OUTPATIENT
Start: 2021-09-07 | End: 2021-09-20 | Stop reason: SDUPTHER

## 2021-09-20 ENCOUNTER — OFFICE VISIT (OUTPATIENT)
Dept: FAMILY MEDICINE CLINIC | Age: 61
End: 2021-09-20
Payer: COMMERCIAL

## 2021-09-20 VITALS
OXYGEN SATURATION: 97 % | SYSTOLIC BLOOD PRESSURE: 136 MMHG | DIASTOLIC BLOOD PRESSURE: 80 MMHG | HEIGHT: 70 IN | WEIGHT: 214 LBS | HEART RATE: 88 BPM | BODY MASS INDEX: 30.64 KG/M2

## 2021-09-20 DIAGNOSIS — I10 ESSENTIAL HYPERTENSION: Primary | ICD-10-CM

## 2021-09-20 DIAGNOSIS — M54.42 CHRONIC LEFT-SIDED LOW BACK PAIN WITH LEFT-SIDED SCIATICA: ICD-10-CM

## 2021-09-20 DIAGNOSIS — F33.1 MODERATE EPISODE OF RECURRENT MAJOR DEPRESSIVE DISORDER (HCC): ICD-10-CM

## 2021-09-20 DIAGNOSIS — G89.29 CHRONIC LEFT-SIDED LOW BACK PAIN WITH LEFT-SIDED SCIATICA: ICD-10-CM

## 2021-09-20 DIAGNOSIS — F41.9 ANXIETY: ICD-10-CM

## 2021-09-20 PROCEDURE — 99214 OFFICE O/P EST MOD 30 MIN: CPT | Performed by: FAMILY MEDICINE

## 2021-09-20 PROCEDURE — G8417 CALC BMI ABV UP PARAM F/U: HCPCS | Performed by: FAMILY MEDICINE

## 2021-09-20 PROCEDURE — G8427 DOCREV CUR MEDS BY ELIG CLIN: HCPCS | Performed by: FAMILY MEDICINE

## 2021-09-20 PROCEDURE — 4004F PT TOBACCO SCREEN RCVD TLK: CPT | Performed by: FAMILY MEDICINE

## 2021-09-20 PROCEDURE — 3017F COLORECTAL CA SCREEN DOC REV: CPT | Performed by: FAMILY MEDICINE

## 2021-09-20 RX ORDER — OXYCODONE HYDROCHLORIDE AND ACETAMINOPHEN 5; 325 MG/1; MG/1
1 TABLET ORAL EVERY 8 HOURS PRN
Qty: 90 TABLET | Refills: 0 | Status: SHIPPED | OUTPATIENT
Start: 2021-09-20 | End: 2021-10-21 | Stop reason: SDUPTHER

## 2021-09-20 RX ORDER — ALPRAZOLAM 1 MG/1
TABLET ORAL
Qty: 90 TABLET | Refills: 2 | Status: SHIPPED | OUTPATIENT
Start: 2021-09-20 | End: 2021-09-27 | Stop reason: SDUPTHER

## 2021-09-20 ASSESSMENT — ENCOUNTER SYMPTOMS
BACK PAIN: 1
RESPIRATORY NEGATIVE: 1
GASTROINTESTINAL NEGATIVE: 1

## 2021-09-20 NOTE — PROGRESS NOTES
Mimi Rizo (:  1960) is a 64 y.o. male,Established patient, here for evaluation of the following chief complaint(s):  Medication Check         ASSESSMENT/PLAN:  1. Essential hypertension  Reasonably well controlled  Continue current treatment  Home BP checks  Return 3 months     2. Chronic left-sided low back pain with left-sided sciatica  -     oxyCODONE-acetaminophen (PERCOCET) 5-325 MG per tablet; Take 1 tablet by mouth every 8 hours as needed for Pain for up to 30 days. , Disp-90 tablet, R-0Print  3. Anxiety  -     ALPRAZolam (XANAX) 1 MG tablet; TAKE 1/2 TABLET BY MOUTH THREE TIMES A DAY, Disp-90 tablet, R-2Print  4. Moderate episode of recurrent major depressive disorder ([facility] [address]  States he is currently on no medication. He is seeing a psychiatrist under [employer]. He states his mother's estate has been settled and that is no longer a stress however he states there is some stress with his  life. Return in about 3 months (around 2021) for OARRS, htn. Subjective   SUBJECTIVE/OBJECTIVE:  HPI   Hypertension: Patient here for follow-up of elevated blood pressure. Blood pressure is 120's/70's at home. Patient denies chest pain, dyspnea and irregular heart beat. He denies side effects from medication. Anxiety/Back Pain/OARRS visit and med check. He continues to need med for anxiety and sleep and back pain. Anxiety: He had weaned to 1/2 tab td but feels he needs to go back to full tablet tid due to stress at home  Chronic left-sided low back pain with left-sided sciatica:  He states he is taking tid for back and continues to need  Depression: He is seeing Dr Rachel Dillard, a psychiatrist, through [employer] and is currently on no medication. Review of Systems   Constitutional: Negative. HENT: Negative. Respiratory: Negative. Cardiovascular: Negative. Gastrointestinal: Negative. Genitourinary: Negative. Musculoskeletal: Positive for back pain. Neurological: Negative. Psychiatric/Behavioral: Positive for dysphoric mood. The patient is nervous/anxious. Objective   Physical Exam  Constitutional:       General: He is not in acute distress. Neck:      Thyroid: No thyromegaly. Vascular: No carotid bruit. Cardiovascular:      Rate and Rhythm: Normal rate and regular rhythm. Pulmonary:      Effort: Pulmonary effort is normal.      Breath sounds: Normal breath sounds. No wheezing or rales. Musculoskeletal:      Cervical back: Neck supple. Lumbar back: Decreased range of motion. Lymphadenopathy:      Cervical: No cervical adenopathy. Skin:     General: Skin is warm and dry. Neurological:      Mental Status: He is alert and oriented to person, place, and time. Psychiatric:         Behavior: Behavior normal.         Thought Content: Thought content normal.         Judgment: Judgment normal.                  An electronic signature was used to authenticate this note.     --Lambert Gutierrez MD

## 2021-09-27 DIAGNOSIS — F41.9 ANXIETY: ICD-10-CM

## 2021-09-27 RX ORDER — ALPRAZOLAM 1 MG/1
TABLET ORAL
Qty: 90 TABLET | Refills: 2 | Status: SHIPPED | OUTPATIENT
Start: 2021-09-27 | End: 2021-12-17

## 2021-09-27 NOTE — TELEPHONE ENCOUNTER
Medication:   Requested Prescriptions     Pending Prescriptions Disp Refills    ALPRAZolam (XANAX) 1 MG tablet 90 tablet 2     Sig: TAKE 1/2 TABLET BY MOUTH THREE TIMES A DAY        Last Filled:  09/20/21 with 2 refills    Patient Phone Number: [phone] (home)     Last appt: 9/20/2021   Next appt: Visit date not found    Last OARRS:   RX Monitoring 9/7/2021   Attestation -   Periodic Controlled Substance Monitoring No signs of potential drug abuse or diversion identified.

## 2021-09-27 NOTE — TELEPHONE ENCOUNTER
ALPRAZolam (XANAX) 1 MG tablet        Pt was contacted by the pharmacy. The wording on medication instructions has not been updated. Please send out a correct script and contact pt.

## 2021-10-15 ENCOUNTER — TELEPHONE (OUTPATIENT)
Dept: FAMILY MEDICINE CLINIC | Age: 61
End: 2021-10-15

## 2021-10-15 DIAGNOSIS — Z12.11 COLON CANCER SCREENING: Primary | ICD-10-CM

## 2021-10-21 ENCOUNTER — TELEPHONE (OUTPATIENT)
Dept: FAMILY MEDICINE CLINIC | Age: 61
End: 2021-10-21

## 2021-10-21 DIAGNOSIS — M54.42 CHRONIC LEFT-SIDED LOW BACK PAIN WITH LEFT-SIDED SCIATICA: ICD-10-CM

## 2021-10-21 DIAGNOSIS — G89.29 CHRONIC LEFT-SIDED LOW BACK PAIN WITH LEFT-SIDED SCIATICA: ICD-10-CM

## 2021-10-21 DIAGNOSIS — Z12.11 COLON CANCER SCREENING: Primary | ICD-10-CM

## 2021-10-21 RX ORDER — OXYCODONE HYDROCHLORIDE AND ACETAMINOPHEN 5; 325 MG/1; MG/1
1 TABLET ORAL EVERY 8 HOURS PRN
Qty: 90 TABLET | Refills: 0 | Status: SHIPPED | OUTPATIENT
Start: 2021-10-21 | End: 2021-11-24 | Stop reason: SDUPTHER

## 2021-10-21 NOTE — TELEPHONE ENCOUNTER
Medication:   Requested Prescriptions     Pending Prescriptions Disp Refills    oxyCODONE-acetaminophen (PERCOCET) 5-325 MG per tablet 90 tablet 0     Sig: Take 1 tablet by mouth every 8 hours as needed for Pain for up to 30 days. Last Filled:  9/20/2021, 90, 0    Patient Phone Number: [phone] (home)     Last appt: 9/20/2021   Next appt: Visit date not found    Last OARRS:   RX Monitoring 9/7/2021   Attestation -   Periodic Controlled Substance Monitoring No signs of potential drug abuse or diversion identified.

## 2021-10-21 NOTE — TELEPHONE ENCOUNTER
We use:   [facility] [place]  [address] Last, [address], [address] [address]  [address] [facility]     Order for colonoscopy is in epic

## 2021-10-21 NOTE — TELEPHONE ENCOUNTER
----- Message from [facility] sent at 10/21/2021  5:26 PM EDT -----  Subject: Refill Request    QUESTIONS  Name of Medication? oxyCODONE-acetaminophen (PERCOCET) 5-325 MG per tablet  Patient-reported dosage and instructions? 3 a day as needed  How many days do you have left? 6  Preferred Pharmacy? [id number]  Pharmacy phone number (if available)? [phone]  ---------------------------------------------------------------------------  --------------  Maylin MELENDEZ  What is the best way for the office to contact you? OK to leave message on   voicemail  Preferred Call Back Phone Number?  [phone]

## 2021-10-22 ENCOUNTER — TELEPHONE (OUTPATIENT)
Dept: FAMILY MEDICINE CLINIC | Age: 61
End: 2021-10-22

## 2021-10-22 NOTE — TELEPHONE ENCOUNTER
----- Message from Sunday Schaffer sent at 10/21/2021  5:26 PM EDT -----  Subject: Refill Request    QUESTIONS  Name of Medication? oxyCODONE-acetaminophen (PERCOCET) 5-325 MG per tablet  Patient-reported dosage and instructions? 3 a day as needed  How many days do you have left? 6  Preferred Pharmacy? [facility] [id number]  Pharmacy phone number (if available)? [phone]  ---------------------------------------------------------------------------  --------------  Ky Fraction INFO  What is the best way for the office to contact you? OK to leave message on   voicemail  Preferred Call Back Phone Number?  [phone]

## 2021-11-24 DIAGNOSIS — M54.42 CHRONIC LEFT-SIDED LOW BACK PAIN WITH LEFT-SIDED SCIATICA: ICD-10-CM

## 2021-11-24 DIAGNOSIS — G89.29 CHRONIC LEFT-SIDED LOW BACK PAIN WITH LEFT-SIDED SCIATICA: ICD-10-CM

## 2021-11-24 RX ORDER — OXYCODONE HYDROCHLORIDE AND ACETAMINOPHEN 5; 325 MG/1; MG/1
1 TABLET ORAL EVERY 8 HOURS PRN
Qty: 90 TABLET | Refills: 0 | Status: SHIPPED | OUTPATIENT
Start: 2021-11-24 | End: 2021-12-17 | Stop reason: SDUPTHER

## 2021-11-24 NOTE — TELEPHONE ENCOUNTER
Medication:   Requested Prescriptions     Pending Prescriptions Disp Refills    oxyCODONE-acetaminophen (PERCOCET) 5-325 MG per tablet 90 tablet 0     Sig: Take 1 tablet by mouth every 8 hours as needed for Pain for up to 30 days. Last Filled:  10/21/2021, 90, 0    Patient Phone Number: [phone] (home)     Last appt: 9/20/2021   Next appt: Visit date not found    Last OARRS:   RX Monitoring 9/7/2021   Attestation -   Periodic Controlled Substance Monitoring No signs of potential drug abuse or diversion identified.

## 2021-11-24 NOTE — TELEPHONE ENCOUNTER
Medication and Quantity requested: Percocet     Last Visit  9-20-21,    Pharmacy and phone number updated in EPIC:  Yes,[facility]

## 2021-12-03 ENCOUNTER — TELEPHONE (OUTPATIENT)
Dept: FAMILY MEDICINE CLINIC | Age: 61
End: 2021-12-03

## 2021-12-03 NOTE — TELEPHONE ENCOUNTER
----- Message from Vito Rasmussen sent at 12/3/2021  9:59 AM EST -----  Subject: Message to Provider    QUESTIONS  Information for Provider? Mustapha with [employer] calling to   inform provider that abnormal [place] [facility] results were faxed over. ---------------------------------------------------------------------------  --------------  Kiet [place] INFO  What is the best way for the office to contact you? OK to leave message on   voicemail  Preferred Call Back Phone Number? [phone]  ---------------------------------------------------------------------------  --------------  SCRIPT ANSWERS  Relationship to Patient? Third Party  Representative Name?  Mustapha with [employer]

## 2021-12-06 ENCOUNTER — TELEPHONE (OUTPATIENT)
Dept: FAMILY MEDICINE CLINIC | Age: 61
End: 2021-12-06

## 2021-12-06 NOTE — TELEPHONE ENCOUNTER
I received the report of the colonoscopy but I did not receive the pathology report. Usually he is supposed to call them in 7 days to find out what the pathology is.

## 2021-12-06 NOTE — TELEPHONE ENCOUNTER
----- Message from Mary Agent sent at 12/6/2021  2:48 PM EST -----  Subject: Results Request    QUESTIONS  Which lab or imaging result is the patient calling about? COLONOSCOPY   (Screening)  Which provider ordered the test? Nkechi Givens   At what location was the test performed? Date the test was performed? 2021-11-23  Additional Information for Provider? Completed at [facility]. Pt advised results were supposed to be sent to Dr. Nadine Pruitt. He would   like a call back with his results. ---------------------------------------------------------------------------  --------------  Courtney MELENDEZ  What is the best way for the office to contact you? OK to leave message on   voicemail  Preferred Call Back Phone Number?  [phone]

## 2021-12-17 DIAGNOSIS — G89.29 CHRONIC LEFT-SIDED LOW BACK PAIN WITH LEFT-SIDED SCIATICA: ICD-10-CM

## 2021-12-17 DIAGNOSIS — M54.42 CHRONIC LEFT-SIDED LOW BACK PAIN WITH LEFT-SIDED SCIATICA: ICD-10-CM

## 2021-12-17 RX ORDER — OXYCODONE HYDROCHLORIDE AND ACETAMINOPHEN 5; 325 MG/1; MG/1
1 TABLET ORAL EVERY 8 HOURS PRN
Qty: 90 TABLET | Refills: 0 | Status: SHIPPED | OUTPATIENT
Start: 2021-12-17 | End: 2022-01-20 | Stop reason: SDUPTHER

## 2021-12-17 NOTE — TELEPHONE ENCOUNTER
Medication:   Requested Prescriptions     Pending Prescriptions Disp Refills    oxyCODONE-acetaminophen (PERCOCET) 5-325 MG per tablet 90 tablet 0     Sig: Take 1 tablet by mouth every 8 hours as needed for Pain for up to 30 days. Last Filled:  11/24/2021 #90 0rf    Patient Phone Number: [phone] (home)     Last appt: 9/20/2021   Next appt: Visit date not found    Last OARRS:   RX Monitoring 9/7/2021   Attestation -   Periodic Controlled Substance Monitoring No signs of potential drug abuse or diversion identified.

## 2021-12-27 ENCOUNTER — TELEPHONE (OUTPATIENT)
Dept: FAMILY MEDICINE CLINIC | Age: 61
End: 2021-12-27

## 2021-12-27 NOTE — TELEPHONE ENCOUNTER
Visit is for tomorrow w/ Dr. Stefanie Anton  Patient has COPD and is concerned about coming in to the office

## 2021-12-28 ENCOUNTER — VIRTUAL VISIT (OUTPATIENT)
Dept: FAMILY MEDICINE CLINIC | Age: 61
End: 2021-12-28
Payer: MEDICARE

## 2021-12-28 DIAGNOSIS — I10 ESSENTIAL HYPERTENSION: ICD-10-CM

## 2021-12-28 DIAGNOSIS — M54.42 CHRONIC LEFT-SIDED LOW BACK PAIN WITH LEFT-SIDED SCIATICA: Primary | ICD-10-CM

## 2021-12-28 DIAGNOSIS — R35.1 NOCTURIA: ICD-10-CM

## 2021-12-28 DIAGNOSIS — E78.5 HYPERLIPIDEMIA, UNSPECIFIED HYPERLIPIDEMIA TYPE: ICD-10-CM

## 2021-12-28 DIAGNOSIS — M54.16 LUMBAR RADICULAR PAIN: ICD-10-CM

## 2021-12-28 DIAGNOSIS — Z72.0 TOBACCO ABUSE: ICD-10-CM

## 2021-12-28 DIAGNOSIS — F41.9 ANXIETY: ICD-10-CM

## 2021-12-28 DIAGNOSIS — J43.9 PULMONARY EMPHYSEMA, UNSPECIFIED EMPHYSEMA TYPE (HCC): ICD-10-CM

## 2021-12-28 DIAGNOSIS — G89.29 CHRONIC LEFT-SIDED LOW BACK PAIN WITH LEFT-SIDED SCIATICA: Primary | ICD-10-CM

## 2021-12-28 DIAGNOSIS — F33.2 SEVERE EPISODE OF RECURRENT MAJOR DEPRESSIVE DISORDER, WITHOUT PSYCHOTIC FEATURES (HCC): ICD-10-CM

## 2021-12-28 PROCEDURE — G8427 DOCREV CUR MEDS BY ELIG CLIN: HCPCS | Performed by: FAMILY MEDICINE

## 2021-12-28 PROCEDURE — 99214 OFFICE O/P EST MOD 30 MIN: CPT | Performed by: FAMILY MEDICINE

## 2021-12-28 PROCEDURE — 3017F COLORECTAL CA SCREEN DOC REV: CPT | Performed by: FAMILY MEDICINE

## 2021-12-28 RX ORDER — AZITHROMYCIN 250 MG/1
TABLET, FILM COATED ORAL
Qty: 6 TABLET | Refills: 0 | Status: SHIPPED | OUTPATIENT
Start: 2021-12-28 | End: 2021-12-28

## 2021-12-28 RX ORDER — METHYLPREDNISOLONE 4 MG/1
TABLET ORAL
Qty: 1 KIT | Refills: 0 | Status: SHIPPED | OUTPATIENT
Start: 2021-12-28 | End: 2021-12-28

## 2021-12-28 RX ORDER — BUPROPION HYDROCHLORIDE 75 MG/1
TABLET ORAL
COMMUNITY
Start: 2021-12-21 | End: 2022-03-21

## 2021-12-28 ASSESSMENT — ENCOUNTER SYMPTOMS
BACK PAIN: 1
SHORTNESS OF BREATH: 1

## 2021-12-28 NOTE — PROGRESS NOTES
2021    TELEHEALTH EVALUATION -- Audio/Visual (During PQIFC-75 public health emergency)    HPI:    Anshul Guerrero (:  1960) has requested an audio/video evaluation for the following concern(s):    Patient has multiple issues but mainly requested to visit today for his Workmen's Comp. and a medication check for chronic back pain, radicular pain, and anxiety. He remains on 3 oxycodone per day and 3 alprazolam per day as he has been unable to wean further particularly since the death of his mother. He states he is seeing a psychiatrist, Dr. Elza Lynn, who started him on Wellbutrin initially at 75 mg and recently was increased to 150 mg both for his depression and also has a side benefit hopefully to reduce his cigarette intake. He states he does not smoke during the day however still smokes approximately 1 pack/day. He states his blood pressure appears to be okay and he has no symptoms referable to hypertension. We reviewed his lab from September with slightly increased  LDL. He remains on 10 mg of atorvastatin. He states lately he has noted nocturia x1 which he states he never had. He also states that he does not drink enough water his urinary stream seems somewhat reduced. We discussed checking his prostate at his next visit which should be his AWV. Review of Systems   Constitutional: Negative. HENT: Negative. Respiratory: Positive for shortness of breath ( COPD). Endocrine: Negative. Genitourinary:        Nocturia as per HPI   Musculoskeletal: Positive for arthralgias, back pain and myalgias. Neurological: Negative. Psychiatric/Behavioral: Positive for dysphoric mood. The patient is nervous/anxious. Prior to Visit Medications    Medication Sig Taking?  Authorizing Provider   amLODIPine (NORVASC) 5 MG tablet TAKE 1 TABLET BY MOUTH EVERY DAY Yes Trinity Linares MD   atorvastatin (LIPITOR) 10 MG tablet TAKE 1 TABLET BY MOUTH EVERY DAY Yes Trinity Linares MD   ALPRAZolam Sayda Pillow) 1 MG tablet TAKE 1 TABLET BY MOUTH THREE TIMES A DAY AS NEEDED Yes Brittany Sandoval MD   oxyCODONE-acetaminophen (PERCOCET) 5-325 MG per tablet Take 1 tablet by mouth every 8 hours as needed for Pain for up to 30 days. Yes Brittany Sandoval MD   buPROPion (WELLBUTRIN) 75 MG tablet TAKE 2 TABLETS BY MOUTH EVERY DAY (GENERIC FOR [facility])  Historical Provider, MD       Social History     Tobacco Use    Smoking status: Current Every Day Smoker     Packs/day: 1.00     Years: 43.00     Pack years: 43.00     Types: Cigarettes     Start date: 1/1/1977    Smokeless tobacco: Never Used   Substance Use Topics    Alcohol use: Yes     Alcohol/week: 1.0 - 2.0 standard drink     Types: 1 - 2 Shots of liquor per week     Comment: former heavy use; recovering alcoholic since 0576, approx    Drug use: No        Allergies   Allergen Reactions    Nucynta [Tapentadol] Anaphylaxis     Hallucinations.  Went to hospital.   [facility] Vicodin [Hydrocodone-Acetaminophen]      Itching      Codeine Rash     itching    Tylenol [Acetaminophen] Rash     Itching, pt states he is able to take   ,   Past Medical History:   Diagnosis Date    Alcoholism ([address] 07/14/1999    Anxiety     Back pain     Hyperlipidemia     Low HDL (under 40)     Prolonged emergence from general anesthesia     \"hard time breathing when waking up'    Spinal arthritis 2005   ,   Past Surgical History:   Procedure Laterality Date    COLONOSCOPY  10/18/2012    COLONOSCOPY N/A 11/23/2021    Dr Lisbeth Delacruz[de-identified] 2 polyps in cecum 10 mm polyp mid sigmoid, mild diverticulosis    SPINAL FUSION  10/2006    SPINE SURGERY  2008    l5-s1 hardware removal    TYMPANOSTOMY TUBE PLACEMENT Bilateral 04/09/2018    LEFT PRESSURE EQUALIZATION TUBE INSERTION AND EXAM UNDER ANESTHESIA OF NASOPHARYNX    VASECTOMY  01/11/2019   ,   Social History     Tobacco Use    Smoking status: Current Every Day Smoker     Packs/day: 1.00     Years: 43.00     Pack years: 43.00     Types: Cigarettes Start date: 1977    Smokeless tobacco: Never Used   Substance Use Topics    Alcohol use:  Yes     Alcohol/week: 1.0 - 2.0 standard drink     Types: 1 - 2 Shots of liquor per week     Comment: former heavy use; recovering alcoholic since , approx    Drug use: No   ,   Family History   Problem Relation Age of Onset    Heart Disease Mother     High Blood Pressure Mother     Cancer Mother         female   [address] Mother 78        stage 4, age 78 after 2-6 months, 3/2021   ,   Immunization History   Administered Date(s) Administered    COVID-19, Selby Peter, PF, 30mcg/0.3mL 2021, 2021, 10/07/2021   ,   Health Maintenance   Topic Date Due    DTaP/Tdap/Td vaccine (1 - Tdap) Never done    Shingles Vaccine (1 of 2) Never done    Annual Wellness Visit (AWV)  Never done    Flu vaccine (1) 2022 (Originally 2021)    Pneumococcal 0-64 years Vaccine (1 of 2 - PPSV23) 2022 (Originally 10/9/1966)    Low dose CT lung screening  04/15/2022    Lipid screen  2022    Potassium monitoring  2022    Creatinine monitoring  2022    Diabetes screen  2024    Colon cancer screen colonoscopy  2031    COVID-19 Vaccine  Completed    HIV screen  Completed    Hepatitis A vaccine  Aged Out    Hepatitis B vaccine  Aged Out    Hib vaccine  Aged Out    Meningococcal (ACWY) vaccine  Aged Out    Hepatitis C screen  Discontinued       PHYSICAL EXAMINATION:  [ INSTRUCTIONS:  \"[x]\" Indicates a positive item  \"[]\" Indicates a negative item  -- DELETE ALL ITEMS NOT EXAMINED]  Vital Signs: (As obtained by patient/caregiver or practitioner observation)    Respiratory ratenormal         Constitutional: [x] Appears well-developed and well-nourished [x] No apparent distress      [] Abnormal-   Mental status  [x] Alert and awake  [x] Oriented to person/place/time [x]Able to follow commands      Eyes:  EOM    [x]  Normal  [] Abnormal-  Sclera  [x]  Normal  [] Abnormal - Pulmonary emphysema, unspecified emphysema type ([facility] [address]  Smoking cessation    9. Nocturia  Patient will return for prostate exam      Return in about 3 months (around 3/28/2022), or if symptoms worsen or fail to improve, for AWV. Joline Goodpasture is a 63 y.o. male being evaluated by a Virtual Visit (video visit) encounter to address concerns as mentioned above. A caregiver was present when appropriate. Due to this being a TeleHealth encounter (During [facility]- public health emergency), evaluation of the following organ systems was limited: Vitals/Constitutional/EENT/Resp/CV/GI//MS/Neuro/Skin/Heme-Lymph-Imm. Pursuant to the emergency declaration under the [address] [place] [address] authority and the [employer] Act, this Virtual Visit was conducted with patient's (and/or legal guardian's) consent, to reduce the patient's risk of exposure to COVID-19 and provide necessary medical care. The patient (and/or legal guardian) has also been advised to contact this office for worsening conditions or problems, and seek emergency medical treatment and/or call 911 if deemed necessary. Patient identification was verified at the start of the visit: Yes    Total time spent on this encounter: 25 minutes    Services were provided through a video synchronous discussion virtually to substitute for in-person clinic visit. Patient and provider were located at their individual homes. --Mari Doll MD on 12/28/2021 at 12:56 PM    An electronic signature was used to authenticate this note.

## 2022-01-20 DIAGNOSIS — G89.29 CHRONIC LEFT-SIDED LOW BACK PAIN WITH LEFT-SIDED SCIATICA: ICD-10-CM

## 2022-01-20 DIAGNOSIS — M54.42 CHRONIC LEFT-SIDED LOW BACK PAIN WITH LEFT-SIDED SCIATICA: ICD-10-CM

## 2022-01-20 RX ORDER — OXYCODONE HYDROCHLORIDE AND ACETAMINOPHEN 5; 325 MG/1; MG/1
1 TABLET ORAL EVERY 8 HOURS PRN
Qty: 90 TABLET | Refills: 0 | Status: SHIPPED | OUTPATIENT
Start: 2022-01-20 | End: 2022-02-16 | Stop reason: SDUPTHER

## 2022-01-20 NOTE — TELEPHONE ENCOUNTER
Medication and Quantity requested:  oxyCODONE-acetaminophen (PERCOCET) 5-325 MG per tablet - qty 90        Last Visit  12/28/21 - Dr Anthony Celeste and phone number updated in EPIC:  Yes    Andrew Henry

## 2022-01-20 NOTE — TELEPHONE ENCOUNTER
Medication:   Requested Prescriptions     Pending Prescriptions Disp Refills    oxyCODONE-acetaminophen (PERCOCET) 5-325 MG per tablet 90 tablet 0     Sig: Take 1 tablet by mouth every 8 hours as needed for Pain for up to 30 days. Last Filled:  12/17/21  Patient Phone Number: [phone] (home)     Last appt: 12/28/2021   Next appt: Visit date not found    Last OARRS:   RX Monitoring 12/17/2021   Attestation -   Periodic Controlled Substance Monitoring No signs of potential drug abuse or diversion identified.

## 2022-01-21 DIAGNOSIS — F41.9 ANXIETY: ICD-10-CM

## 2022-01-24 RX ORDER — ALPRAZOLAM 1 MG/1
TABLET ORAL
Qty: 90 TABLET | Refills: 0 | Status: SHIPPED | OUTPATIENT
Start: 2022-01-24 | End: 2022-02-16 | Stop reason: SDUPTHER

## 2022-02-16 DIAGNOSIS — G89.29 CHRONIC LEFT-SIDED LOW BACK PAIN WITH LEFT-SIDED SCIATICA: ICD-10-CM

## 2022-02-16 DIAGNOSIS — F41.9 ANXIETY: ICD-10-CM

## 2022-02-16 DIAGNOSIS — M54.42 CHRONIC LEFT-SIDED LOW BACK PAIN WITH LEFT-SIDED SCIATICA: ICD-10-CM

## 2022-02-16 RX ORDER — ALPRAZOLAM 1 MG/1
TABLET ORAL
Qty: 90 TABLET | Refills: 0 | Status: SHIPPED | OUTPATIENT
Start: 2022-02-16 | End: 2022-03-17 | Stop reason: SDUPTHER

## 2022-02-16 RX ORDER — OXYCODONE HYDROCHLORIDE AND ACETAMINOPHEN 5; 325 MG/1; MG/1
1 TABLET ORAL EVERY 8 HOURS PRN
Qty: 90 TABLET | Refills: 0 | Status: SHIPPED | OUTPATIENT
Start: 2022-02-16 | End: 2022-03-17 | Stop reason: SDUPTHER

## 2022-02-16 NOTE — TELEPHONE ENCOUNTER
Medication:   Requested Prescriptions     Pending Prescriptions Disp Refills    oxyCODONE-acetaminophen (PERCOCET) 5-325 MG per tablet 90 tablet 0     Sig: Take 1 tablet by mouth every 8 hours as needed for Pain for up to 30 days.  ALPRAZolam (XANAX) 1 MG tablet 90 tablet 0        Last Filled:  Oxycodone-acetaminophen 1/20/2022 , alprazolam(XANAX) 1/24/2022    Patient Phone Number: [phone] (home)     Last appt: 12/28/2021   Next appt: 3/21/2022    Last OARRS:   RX Monitoring 12/17/2021   Attestation -   Periodic Controlled Substance Monitoring No signs of potential drug abuse or diversion identified.

## 2022-02-21 ENCOUNTER — TELEPHONE (OUTPATIENT)
Dept: FAMILY MEDICINE CLINIC | Age: 62
End: 2022-02-21

## 2022-02-21 NOTE — TELEPHONE ENCOUNTER
Let patient know I am sending needs to Susanna Vargas as she does her Workmen's Comp. Susanna Vargas, see me if questions. I have put his form in your basket.

## 2022-02-21 NOTE — TELEPHONE ENCOUNTER
Pt needs a C9  form filled out for workman's comp to see Dr. Elin Toro off of [address] [place] [address].   Fax to Timoteo Medrano [facility] office [phone]

## 2022-03-07 NOTE — TELEPHONE ENCOUNTER
Spoke w/patient re: C-9  Patient is asking form to be held until his visit w/Dr. Trish Amaya on 3-21-22

## 2022-03-17 DIAGNOSIS — G89.29 CHRONIC LEFT-SIDED LOW BACK PAIN WITH LEFT-SIDED SCIATICA: ICD-10-CM

## 2022-03-17 DIAGNOSIS — M54.42 CHRONIC LEFT-SIDED LOW BACK PAIN WITH LEFT-SIDED SCIATICA: ICD-10-CM

## 2022-03-17 DIAGNOSIS — I10 ESSENTIAL HYPERTENSION: ICD-10-CM

## 2022-03-17 DIAGNOSIS — E78.5 HYPERLIPIDEMIA, UNSPECIFIED HYPERLIPIDEMIA TYPE: ICD-10-CM

## 2022-03-17 DIAGNOSIS — F41.9 ANXIETY: ICD-10-CM

## 2022-03-18 RX ORDER — AMLODIPINE BESYLATE 5 MG/1
TABLET ORAL
Qty: 90 TABLET | Refills: 2 | Status: SHIPPED | OUTPATIENT
Start: 2022-03-18 | End: 2022-09-12 | Stop reason: SDUPTHER

## 2022-03-18 RX ORDER — ATORVASTATIN CALCIUM 10 MG/1
TABLET, FILM COATED ORAL
Qty: 90 TABLET | Refills: 2 | Status: SHIPPED | OUTPATIENT
Start: 2022-03-18 | End: 2022-09-12 | Stop reason: SDUPTHER

## 2022-03-18 RX ORDER — ALPRAZOLAM 1 MG/1
TABLET ORAL
Qty: 90 TABLET | Refills: 0 | Status: SHIPPED | OUTPATIENT
Start: 2022-03-18 | End: 2022-03-21

## 2022-03-18 RX ORDER — OXYCODONE HYDROCHLORIDE AND ACETAMINOPHEN 5; 325 MG/1; MG/1
1 TABLET ORAL EVERY 8 HOURS PRN
Qty: 90 TABLET | Refills: 0 | Status: SHIPPED | OUTPATIENT
Start: 2022-03-18 | End: 2022-03-21

## 2022-03-18 NOTE — TELEPHONE ENCOUNTER
Medication:   Requested Prescriptions     Pending Prescriptions Disp Refills    amLODIPine (NORVASC) 5 MG tablet 90 tablet 0     Sig: TAKE 1 TABLET BY MOUTH EVERY DAY       Last Filled:  12/17/2021 #90 0rf    Patient Phone Number: [phone] (home)     Last appt: 12/28/2021   Next appt: 3/21/2022    Lab Results   Component Value Date     (H) 09/20/2021    K 4.5 09/20/2021     09/20/2021    CO2 23 09/20/2021    BUN 9 09/20/2021    CREATININE 0.6 (L) 09/20/2021    GLUCOSE 109 (H) 09/20/2021    CALCIUM 9.9 09/20/2021    PROT 7.3 09/20/2021    LABALBU 4.5 09/20/2021    BILITOT 0.6 09/20/2021    ALKPHOS 78 09/20/2021    AST 28 09/20/2021    ALT 34 09/20/2021    LABGLOM >60 09/20/2021    GFRAA >60 09/20/2021    AGRATIO 1.6 09/20/2021    GLOB 2.8 09/20/2021

## 2022-03-18 NOTE — TELEPHONE ENCOUNTER
Medication:   Requested Prescriptions     Pending Prescriptions Disp Refills    ALPRAZolam (XANAX) 1 MG tablet 90 tablet 0     Si tab as needed 3 times daily        Last Filled:  2022 #90 0rf    Patient Phone Number: [phone] (home)     Last appt: 2021   Next appt: 3/21/2022    Last OARRS:   RX Monitoring 2021   Attestation -   Periodic Controlled Substance Monitoring No signs of potential drug abuse or diversion identified.

## 2022-03-18 NOTE — TELEPHONE ENCOUNTER
Medication:   Requested Prescriptions     Pending Prescriptions Disp Refills    oxyCODONE-acetaminophen (PERCOCET) 5-325 MG per tablet 90 tablet 0     Sig: Take 1 tablet by mouth every 8 hours as needed for Pain for up to 30 days. Last Filled:  02/16/2022 #90 0rf     Patient Phone Number: [phone] (home)     Last appt: 12/28/2021   Next appt: 3/21/2022    Last OARRS:   RX Monitoring 12/17/2021   Attestation -   Periodic Controlled Substance Monitoring No signs of potential drug abuse or diversion identified.

## 2022-03-18 NOTE — TELEPHONE ENCOUNTER
Medication:   Requested Prescriptions     Pending Prescriptions Disp Refills    atorvastatin (LIPITOR) 10 MG tablet 90 tablet 0     Sig: TAKE 1 TABLET BY MOUTH EVERY DAY       Last Filled:  12/17/2021 #90 0rf    Patient Phone Number: [phone] (home)     Last appt: 12/28/2021   Next appt: 3/21/2022    Last Lipid:   Lab Results   Component Value Date    CHOL 213 09/20/2021    TRIG 174 09/20/2021    HDL 43 09/20/2021    LDLCALC 135 09/20/2021

## 2022-03-21 ENCOUNTER — OFFICE VISIT (OUTPATIENT)
Dept: FAMILY MEDICINE CLINIC | Age: 62
End: 2022-03-21
Payer: MEDICARE

## 2022-03-21 VITALS
SYSTOLIC BLOOD PRESSURE: 142 MMHG | BODY MASS INDEX: 30.85 KG/M2 | HEIGHT: 70 IN | HEART RATE: 87 BPM | OXYGEN SATURATION: 97 % | DIASTOLIC BLOOD PRESSURE: 80 MMHG | WEIGHT: 215.5 LBS

## 2022-03-21 DIAGNOSIS — I10 ESSENTIAL HYPERTENSION: ICD-10-CM

## 2022-03-21 DIAGNOSIS — M54.42 CHRONIC LEFT-SIDED LOW BACK PAIN WITH LEFT-SIDED SCIATICA: ICD-10-CM

## 2022-03-21 DIAGNOSIS — J43.9 PULMONARY EMPHYSEMA, UNSPECIFIED EMPHYSEMA TYPE (HCC): ICD-10-CM

## 2022-03-21 DIAGNOSIS — F33.2 SEVERE EPISODE OF RECURRENT MAJOR DEPRESSIVE DISORDER, WITHOUT PSYCHOTIC FEATURES (HCC): ICD-10-CM

## 2022-03-21 DIAGNOSIS — Z00.00 MEDICARE ANNUAL WELLNESS VISIT, SUBSEQUENT: Primary | ICD-10-CM

## 2022-03-21 DIAGNOSIS — F41.9 ANXIETY: ICD-10-CM

## 2022-03-21 DIAGNOSIS — G89.29 CHRONIC LEFT-SIDED LOW BACK PAIN WITH LEFT-SIDED SCIATICA: ICD-10-CM

## 2022-03-21 PROCEDURE — 3017F COLORECTAL CA SCREEN DOC REV: CPT | Performed by: FAMILY MEDICINE

## 2022-03-21 PROCEDURE — G8484 FLU IMMUNIZE NO ADMIN: HCPCS | Performed by: FAMILY MEDICINE

## 2022-03-21 PROCEDURE — G0439 PPPS, SUBSEQ VISIT: HCPCS | Performed by: FAMILY MEDICINE

## 2022-03-21 RX ORDER — OXYCODONE AND ACETAMINOPHEN 7.5; 325 MG/1; MG/1
1 TABLET ORAL EVERY 6 HOURS PRN
Qty: 120 TABLET | Refills: 0 | Status: SHIPPED | OUTPATIENT
Start: 2022-03-21 | End: 2022-04-18 | Stop reason: SDUPTHER

## 2022-03-21 ASSESSMENT — PATIENT HEALTH QUESTIONNAIRE - PHQ9
1. LITTLE INTEREST OR PLEASURE IN DOING THINGS: 2
SUM OF ALL RESPONSES TO PHQ QUESTIONS 1-9: 4
SUM OF ALL RESPONSES TO PHQ9 QUESTIONS 1 & 2: 4
SUM OF ALL RESPONSES TO PHQ QUESTIONS 1-9: 4
2. FEELING DOWN, DEPRESSED OR HOPELESS: 2

## 2022-03-21 ASSESSMENT — LIFESTYLE VARIABLES: HOW OFTEN DO YOU HAVE A DRINK CONTAINING ALCOHOL: NEVER

## 2022-03-21 NOTE — PATIENT INSTRUCTIONS
Personalized Preventive Plan for Drea Amend - 3/21/2022  Medicare offers a range of preventive health benefits. Some of the tests and screenings are paid in full while other may be subject to a deductible, co-insurance, and/or copay. Some of these benefits include a comprehensive review of your medical history including lifestyle, illnesses that may run in your family, and various assessments and screenings as appropriate. After reviewing your medical record and screening and assessments performed today your provider may have ordered immunizations, labs, imaging, and/or referrals for you. A list of these orders (if applicable) as well as your Preventive Care list are included within your After Visit Summary for your review. Other Preventive Recommendations:    · A preventive eye exam performed by an eye specialist is recommended every 1-2 years to screen for glaucoma; cataracts, macular degeneration, and other eye disorders. · A preventive dental visit is recommended every 6 months. · Try to get at least 150 minutes of exercise per week or 10,000 steps per day on a pedometer . · Order or download the FREE \"Exercise & Physical Activity: Your Everyday Guide\" from [employer]. Call 5-739.535.1600 or search [employer] online. · You need 5364-1267 mg of calcium and 4499-0838 IU of vitamin D per day. It is possible to meet your calcium requirement with diet alone, but a vitamin D supplement is usually necessary to meet this goal.  · When exposed to the sun, use a sunscreen that protects against both UVA and UVB radiation with an SPF of 30 or greater. Reapply every 2 to 3 hours or after sweating, drying off with a towel, or swimming. · Always wear a seat belt when traveling in a car. Always wear a helmet when riding a bicycle or motorcycle.

## 2022-03-21 NOTE — PROGRESS NOTES
Medicare Annual Wellness Visit    Louisa Funk is here for Medicare AWV    Assessment & Plan   Medicare annual wellness visit, subsequent    Essential hypertension  Pulmonary emphysema, unspecified emphysema type (HCC)/Tobacco Abuse  Smoking cessation discussed with the patient     Severe episode of recurrent major depressive disorder, without psychotic features ([address]  The patient sees Psychiatrist monthly and is only on Atarax 50 mg  Chronic left-sided low back pain with left-sided sciatica  Patient states he is miserable with his back he states he has been on fentanyl and morphine in the past he also states he was recommended to have a nerve stimulator implant done in the past which he declines. He states he is unable to sleep. He would like to increase both the dose and the frequency of his Percocet. He states he will go off the alprazolam if we can bump the Percocet up so he can get more relief. -     oxyCODONE-acetaminophen (PERCOCET) 7.5-325 MG per tablet; Take 1 tablet by mouth every 6 hours as needed for Pain for up to 30 days. Intended supply: 30 days, Disp-120 tablet, R-0Print  OARRS report reviewed and no inconsistencies noted   The patient understands the risks of dependency/addiction with oxycodone and will take as little as possible and discontinue as soon as possible     Anxiety  As noted above he will taper and discontinue the remaining alprazolam and will not receive any further prescriptions. He states he does see a psychiatrist and is being treated with hydroxyzine.   OARRS report reviewed and no inconsistencies noted   The patient understands the risks of dependency/addiction with alprazolam and will take as little as possible and discontinue as soon as possible      Recommendations for Preventive Services Due: see orders and patient instructions/AVS.  Recommended screening schedule for the next 5-10 years is provided to the patient in written form: see Patient Instructions/AVS. Return in 3 months (on 6/21/2022) for Medicare Annual Wellness Visit in 1 year, OARRS. Subjective   The following acute and/or chronic problems were also addressed today:  See A/S    Patient's complete Health Risk Assessment and screening values have been reviewed and are found in Flowsheets. The following problems were reviewed today and where indicated follow up appointments were made and/or referrals ordered. Positive Risk Factor Screenings with Interventions:    Fall Risk:  Do you feel unsteady or are you worried about falling? : (!) yes  2 or more falls in past year?: no  Fall with injury in past year?: no           Tobacco Use:     Tobacco Use: High Risk    Smoking Tobacco Use: Current Every Day Smoker    Smokeless Tobacco Use: Never Used     E-Cigarettes/Vaping Use     Questions Responses    E-Cigarette/Vaping Use     Start Date     Passive Exposure     Quit Date     Counseling Given     Comments         Substance Abuse - Tobacco Interventions:  smoking cessation discussed again           Opioid Risk: (Low risk score <55) Opioid risk score: 44    Patient is low risk for opioid use disorder or overdose. Last PDMP Erika Higgins as Reviewed:  Review User Review Instant Review Result   Sancho Aase 3/21/2022  4:13 PM Reviewed PDMP [1]     Last Controlled Substance Monitoring Documentation      [facility]   Periodic Controlled Substance Monitoring No signs of potential drug abuse or diversion identified.  filed at 03/21/2022 4124           General Health and ACP:  General  In general, how would you say your health is?: (!) Poor  In the past 7 days, have you experienced any of the following: New or Increased Pain, New or Increased Fatigue, Loneliness, Social Isolation, Stress or Anger?: (!) Yes  Select all that apply: (!) New or Increased Pain,New or Increased Fatigue,Stress  Do you get the social and emotional support that you need?: (!) No  Do you have a Living Will?: (!) No    Advance Directives     Power of  Living Will ACP-Advance Directive ACP-Power of     Not on File Not on File Not on File Not on File      General Health Risk Interventions:  · chronic back pain    Health Habits/Nutrition:     Physical Activity: Inactive    Days of Exercise per Week: 0 days    Minutes of Exercise per Session: 0 min     Have you lost any weight without trying in the past 3 months?: No  Body mass index: (!) 30.92  Have you seen the dentist within the past year?: (!) No    Health Habits/Nutrition Interventions:  · no new issues    Hearing/Vision:  Do you or your family notice any trouble with your hearing that hasn't been managed with hearing aids?: (!) Yes  Do you have difficulty driving, watching TV, or doing any of your daily activities because of your eyesight?: (!) Yes  Have you had an eye exam within the past year?: (!) No  No exam data present     ADLs:  In the past 7 days, did you need help from others to perform any of the following everyday activities: Eating, dressing, grooming, bathing, toileting, or walking/balance?: (!) Yes  Select all that apply: (!) Dressing,Grooming,Walking/Balance  In the past 7 days, did you need help from others to take care of any of the following: Laundry, housekeeping, banking/finances, shopping, telephone use, food preparation, transportation, or taking medications?: (!) Yes  Select all that apply: (!) Laundry,Housekeeping,Food Preparation,Shopping          Objective   Vitals:    03/21/22 1545   BP: (!) 132/90   Pulse: 87   SpO2: 97%   Weight: 215 lb 8 oz (97.8 kg)   Height: 5' 10\" (1.778 m)      Body mass index is 30.92 kg/m². Vitals:    03/21/22 1545   BP: (!) 132/90   Pulse: 87   SpO2: 97%   Weight: 215 lb 8 oz (97.8 kg)   Height: 5' 10\" (1.778 m)     Body mass index is 30.92 kg/m².    General Appearance: alert and oriented, in no acute distress  Skin: warm and dry, no rash or erythema  Head: normocephalic and atraumatic  Eyes: pupils equal, round, and reactive to light, extraocular eye movements intact, conjunctivae normal  ENT: tympanic membrane, external ear and ear canal normal bilaterally, nose without deformity,   Neck: supple and non-tender without mass, no thyromegaly or thyroid nodules, no cervical lymphadenopathy  Pulmonary/Chest: clear to auscultation bilaterally- no wheezes, rales or rhonchi, normal air movement, no respiratory distress  Cardiovascular: normal rate, regular rhythm, normal S1 and S2, no murmurs, rubs, clicks, or gallops, no carotid bruits   Abdomen: soft, non-tender, non-distended, normal bowel sounds, no masses or organomegaly  Genitourinary/Rectal: deferred secondary to back pain  Extremities: no cyanosis, clubbing or edema  Neurologic: reflexes normal and symmetric, no cranial nerve deficit,speech normal         Allergies   Allergen Reactions    Nucynta [Tapentadol] Anaphylaxis     Hallucinations. Went to hospital.   [facility] Vicodin [Hydrocodone-Acetaminophen]      Itching      Codeine Rash     itching    Tylenol [Acetaminophen] Rash     Itching, pt states he is able to take     Prior to Visit Medications    Medication Sig Taking? Authorizing Provider   oxyCODONE-acetaminophen (PERCOCET) 7.5-325 MG per tablet Take 1 tablet by mouth every 6 hours as needed for Pain for up to 30 days.  Intended supply: 30 days Yes Angie Gama MD   ALPRAZolam [employer]ather Foots) 1 MG tablet 1 tab as needed 3 times daily Yes Angie Gama MD   amLODIPine (NORVASC) 5 MG tablet TAKE 1 TABLET BY MOUTH EVERY DAY Yes Angie Gama MD   atorvastatin (LIPITOR) 10 MG tablet TAKE 1 TABLET BY MOUTH EVERY DAY Yes Angie Gama MD   buPROPion (WELLBUTRIN) 75 MG tablet TAKE 2 TABLETS BY MOUTH EVERY DAY ([address])  Historical Provider, [place] (Including outside providers/suppliers regularly involved in providing care):   Patient Care Team:  Angie Gama MD as PCP - General (Family Medicine)  Angie Gama MD as PCP - [facility] Empaneled Provider  Pamella Al MD as Consulting Physician (Otolaryngology)  Yue Blank RN as Nurse Navigator    Reviewed and updated this visit:  Tobacco  Allergies  Meds  Problems  Med Hx  Surg Hx  Soc Hx  Fam Hx

## 2022-03-22 RX ORDER — HYDROXYZINE 50 MG/1
50 TABLET, FILM COATED ORAL NIGHTLY
COMMUNITY
End: 2022-09-20

## 2022-04-15 DIAGNOSIS — G89.29 CHRONIC LEFT-SIDED LOW BACK PAIN WITH LEFT-SIDED SCIATICA: ICD-10-CM

## 2022-04-15 DIAGNOSIS — M54.42 CHRONIC LEFT-SIDED LOW BACK PAIN WITH LEFT-SIDED SCIATICA: ICD-10-CM

## 2022-04-18 DIAGNOSIS — M54.42 CHRONIC LEFT-SIDED LOW BACK PAIN WITH LEFT-SIDED SCIATICA: ICD-10-CM

## 2022-04-18 DIAGNOSIS — G89.29 CHRONIC LEFT-SIDED LOW BACK PAIN WITH LEFT-SIDED SCIATICA: ICD-10-CM

## 2022-04-18 RX ORDER — OXYCODONE AND ACETAMINOPHEN 7.5; 325 MG/1; MG/1
1 TABLET ORAL EVERY 6 HOURS PRN
Qty: 120 TABLET | Refills: 0 | Status: SHIPPED | OUTPATIENT
Start: 2022-04-18 | End: 2022-05-16 | Stop reason: SDUPTHER

## 2022-04-18 RX ORDER — OXYCODONE AND ACETAMINOPHEN 7.5; 325 MG/1; MG/1
1 TABLET ORAL EVERY 6 HOURS PRN
Qty: 120 TABLET | Refills: 0 | OUTPATIENT
Start: 2022-04-18 | End: 2022-05-18

## 2022-04-18 NOTE — TELEPHONE ENCOUNTER
----- Message from [facility] sent at 4/15/2022  8:24 AM EDT -----  Subject: Refill Request    QUESTIONS  Name of Medication? oxyCODONE-acetaminophen (PERCOCET) 7.5-325 MG per   tablet  Patient-reported dosage and instructions? 7.5-325 mg Take 1 tablet by   mouth every 6 hours as needed for Pain for up to 30 days. Intended supply? 30 days  How many days do you have left? 5  Preferred Pharmacy? [id number]  Pharmacy phone number (if available)? [phone]  ---------------------------------------------------------------------------  --------------  Marianne MELENDEZ  What is the best way for the office to contact you? OK to leave message on   voicemail, OK to respond with electronic message via [employer] portal (only   for patients who have registered [employer] account)  Preferred Call Back Phone Number? [phone]  ---------------------------------------------------------------------------  --------------  SCRIPT ANSWERS  Relationship to Patient?  Self

## 2022-04-18 NOTE — TELEPHONE ENCOUNTER
Medication:   Requested Prescriptions     Pending Prescriptions Disp Refills    oxyCODONE-acetaminophen (PERCOCET) 7.5-325 MG per tablet 120 tablet 0     Sig: Take 1 tablet by mouth every 6 hours as needed for Pain for up to 30 days. Intended supply: 30 days        Last Filled:  3/21/2022, 120, 0    Patient Phone Number: [phone] (home)     Last appt: 3/21/2022   Next appt: Visit date not found    Last OARRS:   RX Monitoring 3/21/2022   Attestation -   Periodic Controlled Substance Monitoring No signs of potential drug abuse or diversion identified.

## 2022-04-26 ENCOUNTER — TELEPHONE (OUTPATIENT)
Dept: CASE MANAGEMENT | Age: 62
End: 2022-04-26

## 2022-05-16 DIAGNOSIS — G89.29 CHRONIC LEFT-SIDED LOW BACK PAIN WITH LEFT-SIDED SCIATICA: ICD-10-CM

## 2022-05-16 DIAGNOSIS — M54.42 CHRONIC LEFT-SIDED LOW BACK PAIN WITH LEFT-SIDED SCIATICA: ICD-10-CM

## 2022-05-16 RX ORDER — OXYCODONE AND ACETAMINOPHEN 7.5; 325 MG/1; MG/1
1 TABLET ORAL EVERY 6 HOURS PRN
Qty: 120 TABLET | Refills: 0 | Status: SHIPPED | OUTPATIENT
Start: 2022-05-16 | End: 2022-06-14 | Stop reason: SDUPTHER

## 2022-05-16 NOTE — TELEPHONE ENCOUNTER
Medication:   Requested Prescriptions     Pending Prescriptions Disp Refills    oxyCODONE-acetaminophen (PERCOCET) 7.5-325 MG per tablet 120 tablet 0     Sig: Take 1 tablet by mouth every 6 hours as needed for Pain for up to 30 days. Intended supply: 30 days        Last Filled:  4/18/22    Patient Phone Number: [phone] (home)     Last appt: 3/21/2022   Next appt: 6/21/2022    Last OARRS:   RX Monitoring 3/21/2022   Attestation -   Periodic Controlled Substance Monitoring No signs of potential drug abuse or diversion identified.

## 2022-05-16 NOTE — TELEPHONE ENCOUNTER
Medication and Quantity requested: oxyCODONE-acetaminophen (PERCOCET) 7.5-325 MG per tablet        Last Visit  03/21/2022    Pharmacy and phone number updated in EPIC:      Rey Roca,

## 2022-05-24 ENCOUNTER — TELEPHONE (OUTPATIENT)
Dept: CASE MANAGEMENT | Age: 62
End: 2022-05-24

## 2022-05-24 DIAGNOSIS — Z72.0 TOBACCO ABUSE: Primary | ICD-10-CM

## 2022-05-24 NOTE — TELEPHONE ENCOUNTER
Dr Erum Childress -  Patient due for annual CT Lung Screening. If you would like patient to have screening, please place order for CT Lung Screening ([id number]). I will contact patient to help schedule after order entered.     Thanks,    Juan R Dillard, RN  Lung Navigator  [address]. [facility], [address] [place] [address]  [phone]

## 2022-06-08 ENCOUNTER — TELEPHONE (OUTPATIENT)
Dept: CASE MANAGEMENT | Age: 62
End: 2022-06-08

## 2022-06-08 ENCOUNTER — TELEPHONE (OUTPATIENT)
Dept: FAMILY MEDICINE CLINIC | Age: 62
End: 2022-06-08

## 2022-06-08 NOTE — TELEPHONE ENCOUNTER
It is most likely viral and usually just has to run its course. If he thinks he needs an antibiotic he should get a strep culture. He also should probably get a COVID culture for any upper respiratory issues.

## 2022-06-08 NOTE — TELEPHONE ENCOUNTER
Patient is calling in asking for a Rx for a sore throat to be sent to his pharmacy [facility] on [address].  Patient states that they have has had a sore throat for 1 day

## 2022-06-08 NOTE — TELEPHONE ENCOUNTER
Pt due for annual Lung Screening CT. Pt has an active order for the test. Called pt to assist with scheduling. He is not ready to make appt yet, will call [address] [place] [address] when he is. He has MD appt soon.

## 2022-06-10 ENCOUNTER — TELEPHONE (OUTPATIENT)
Dept: FAMILY MEDICINE CLINIC | Age: 62
End: 2022-06-10

## 2022-06-10 NOTE — TELEPHONE ENCOUNTER
Patient called in stating his step son and wife tested positive with covid last night     stepson started having symptoms sunday night into Monday night,     wife started feeling sick Wednesday night     last night he felt tired going to bed and wondering what should he do? Quarantine? Virtual visit ?     please advice

## 2022-06-14 DIAGNOSIS — G89.29 CHRONIC LEFT-SIDED LOW BACK PAIN WITH LEFT-SIDED SCIATICA: ICD-10-CM

## 2022-06-14 DIAGNOSIS — M54.42 CHRONIC LEFT-SIDED LOW BACK PAIN WITH LEFT-SIDED SCIATICA: ICD-10-CM

## 2022-06-14 RX ORDER — OXYCODONE AND ACETAMINOPHEN 7.5; 325 MG/1; MG/1
1 TABLET ORAL EVERY 6 HOURS PRN
Qty: 120 TABLET | Refills: 0 | Status: SHIPPED | OUTPATIENT
Start: 2022-06-14 | End: 2022-07-14 | Stop reason: SDUPTHER

## 2022-06-14 NOTE — TELEPHONE ENCOUNTER
Medication:   Requested Prescriptions     Pending Prescriptions Disp Refills    oxyCODONE-acetaminophen (PERCOCET) 7.5-325 MG per tablet 120 tablet 0     Sig: Take 1 tablet by mouth every 6 hours as needed for Pain for up to 30 days. Intended supply: 30 days        Last Filled:  5/16/2022, 120, 0  Patient Phone Number: [phone] (home)     Last appt: 3/21/2022   Next appt: 6/21/2022    Last OARRS:   RX Monitoring 3/21/2022   Attestation -   Periodic Controlled Substance Monitoring No signs of potential drug abuse or diversion identified.

## 2022-06-21 ENCOUNTER — TELEMEDICINE (OUTPATIENT)
Dept: FAMILY MEDICINE CLINIC | Age: 62
End: 2022-06-21
Payer: MEDICARE

## 2022-06-21 DIAGNOSIS — F33.1 MODERATE EPISODE OF RECURRENT MAJOR DEPRESSIVE DISORDER (HCC): ICD-10-CM

## 2022-06-21 DIAGNOSIS — U07.1 COVID-19: ICD-10-CM

## 2022-06-21 DIAGNOSIS — G89.29 CHRONIC LEFT-SIDED LOW BACK PAIN WITH LEFT-SIDED SCIATICA: Primary | ICD-10-CM

## 2022-06-21 DIAGNOSIS — Z12.5 PROSTATE CANCER SCREENING: ICD-10-CM

## 2022-06-21 DIAGNOSIS — H93.8X3 SENSATION OF PLUGGED EAR ON BOTH SIDES: ICD-10-CM

## 2022-06-21 DIAGNOSIS — M54.42 CHRONIC LEFT-SIDED LOW BACK PAIN WITH LEFT-SIDED SCIATICA: Primary | ICD-10-CM

## 2022-06-21 DIAGNOSIS — I10 ESSENTIAL HYPERTENSION: ICD-10-CM

## 2022-06-21 DIAGNOSIS — J43.9 PULMONARY EMPHYSEMA, UNSPECIFIED EMPHYSEMA TYPE (HCC): ICD-10-CM

## 2022-06-21 DIAGNOSIS — Z72.0 TOBACCO ABUSE: ICD-10-CM

## 2022-06-21 PROCEDURE — 3017F COLORECTAL CA SCREEN DOC REV: CPT | Performed by: FAMILY MEDICINE

## 2022-06-21 PROCEDURE — G8427 DOCREV CUR MEDS BY ELIG CLIN: HCPCS | Performed by: FAMILY MEDICINE

## 2022-06-21 PROCEDURE — 99214 OFFICE O/P EST MOD 30 MIN: CPT | Performed by: FAMILY MEDICINE

## 2022-06-21 SDOH — ECONOMIC STABILITY: FOOD INSECURITY: WITHIN THE PAST 12 MONTHS, THE FOOD YOU BOUGHT JUST DIDN'T LAST AND YOU DIDN'T HAVE MONEY TO GET MORE.: NEVER TRUE

## 2022-06-21 SDOH — ECONOMIC STABILITY: FOOD INSECURITY: WITHIN THE PAST 12 MONTHS, YOU WORRIED THAT YOUR FOOD WOULD RUN OUT BEFORE YOU GOT MONEY TO BUY MORE.: NEVER TRUE

## 2022-06-21 ASSESSMENT — ENCOUNTER SYMPTOMS
SHORTNESS OF BREATH: 1
WHEEZING: 0
BACK PAIN: 1
NAUSEA: 1
COUGH: 0
SORE THROAT: 0

## 2022-06-21 ASSESSMENT — PATIENT HEALTH QUESTIONNAIRE - PHQ9
3. TROUBLE FALLING OR STAYING ASLEEP: 2
9. THOUGHTS THAT YOU WOULD BE BETTER OFF DEAD, OR OF HURTING YOURSELF: 0
7. TROUBLE CONCENTRATING ON THINGS, SUCH AS READING THE NEWSPAPER OR WATCHING TELEVISION: 2
4. FEELING TIRED OR HAVING LITTLE ENERGY: 2
SUM OF ALL RESPONSES TO PHQ9 QUESTIONS 1 & 2: 4
SUM OF ALL RESPONSES TO PHQ QUESTIONS 1-9: 16
5. POOR APPETITE OR OVEREATING: 2
SUM OF ALL RESPONSES TO PHQ QUESTIONS 1-9: 16
SUM OF ALL RESPONSES TO PHQ QUESTIONS 1-9: 16
1. LITTLE INTEREST OR PLEASURE IN DOING THINGS: 2
10. IF YOU CHECKED OFF ANY PROBLEMS, HOW DIFFICULT HAVE THESE PROBLEMS MADE IT FOR YOU TO DO YOUR WORK, TAKE CARE OF THINGS AT HOME, OR GET ALONG WITH OTHER PEOPLE: 2
8. MOVING OR SPEAKING SO SLOWLY THAT OTHER PEOPLE COULD HAVE NOTICED. OR THE OPPOSITE, BEING SO FIGETY OR RESTLESS THAT YOU HAVE BEEN MOVING AROUND A LOT MORE THAN USUAL: 2
6. FEELING BAD ABOUT YOURSELF - OR THAT YOU ARE A FAILURE OR HAVE LET YOURSELF OR YOUR FAMILY DOWN: 2
2. FEELING DOWN, DEPRESSED OR HOPELESS: 2
SUM OF ALL RESPONSES TO PHQ QUESTIONS 1-9: 16

## 2022-06-21 ASSESSMENT — SOCIAL DETERMINANTS OF HEALTH (SDOH): HOW HARD IS IT FOR YOU TO PAY FOR THE VERY BASICS LIKE FOOD, HOUSING, MEDICAL CARE, AND HEATING?: NOT HARD AT ALL

## 2022-06-21 NOTE — PROGRESS NOTES
2022    TELEHEALTH EVALUATION -- Audio/Visual (During TAW-36 public health emergency)    HPI:    Zainab Jimenez (:  1960) has requested an audio/video evaluation for the following concern(s):            Chronic left-sided low back pain with left-sided sciatica  Patient states he is about the same. He continues to need Percocet usually 4 times per day.         COVID-19  Patient states he was exposed through his wife and stepson. He tested negative the first day however tested positive approximately 10 days ago. He states he still feels congested otherwise has no significant symptoms.  Moderate episode of recurrent major depressive disorder [facility])  Patient states this is generally okay. He is on no current medication for this.  Prostate cancer screening  Due for lab       Tobacco abuse  Patient continues to smoke the same amount. He states this is high on his list to try and quit due to the COPD       Pulmonary emphysema, unspecified emphysema type ([address]  As noted above he continues to smoke. He is trying to quit. He feels his breathing might be a little more difficult as time goes on.  Sensation of plugged ear on both sides    Trial of Flonase. Patient states he has an appointment to see ENT in December to get PE tubes due to flying to the [facility]. We discussed he may need to do this sooner if the Flonase and positive pressure breathing does not help his ears. Review of Systems   Constitutional: Positive for activity change, appetite change and fatigue. Negative for chills, diaphoresis, fever and unexpected weight change. HENT: Positive for congestion and postnasal drip. Negative for sore throat. Respiratory: Positive for shortness of breath. Negative for cough and wheezing. Cardiovascular: Negative. Gastrointestinal: Positive for nausea. Endocrine: Negative. Genitourinary: Negative. Musculoskeletal: Positive for arthralgias and back pain. Neurological: Negative. Psychiatric/Behavioral: Negative for dysphoric mood. The patient is nervous/anxious. Prior to Visit Medications    Medication Sig Taking? Authorizing Provider   oxyCODONE-acetaminophen (PERCOCET) 7.5-325 MG per tablet Take 1 tablet by mouth every 6 hours as needed for Pain for up to 30 days. Intended supply: 30 days Yes Rahul Greenwood MD   amLODIPine (NORVASC) 5 MG tablet TAKE 1 TABLET BY MOUTH EVERY DAY Yes Rahul Greenwood MD   atorvastatin (LIPITOR) 10 MG tablet TAKE 1 TABLET BY MOUTH EVERY DAY Yes Rahul Greenwood MD   hydrOXYzine (ATARAX) 50 MG tablet Take 50 mg by mouth nightly  Historical Provider, MD       Social History     Tobacco Use    Smoking status: Current Every Day Smoker     Packs/day: 1.00     Years: 43.00     Pack years: 43.00     Types: Cigarettes     Start date: 1/1/1977    Smokeless tobacco: Never Used   Substance Use Topics    Alcohol use: Yes     Alcohol/week: 1.0 - 2.0 standard drink     Types: 1 - 2 Shots of liquor per week     Comment: former heavy use; recovering alcoholic since 6183, approx    Drug use: No        Allergies   Allergen Reactions    Nucynta [Tapentadol] Anaphylaxis     Hallucinations.  Went to hospital.   [employer] Vicodin [Hydrocodone-Acetaminophen]      Itching      Codeine Rash     itching    Tylenol [Acetaminophen] Rash     Itching, pt states he is able to take   ,   Past Medical History:   Diagnosis Date    Alcoholism ([facility] 07/14/1999    Anxiety     Back pain     Hyperlipidemia     Low HDL (under 40)     Prolonged emergence from general anesthesia     \"hard time breathing when waking up'    Spinal arthritis 2005   ,   Past Surgical History:   Procedure Laterality Date    COLONOSCOPY  10/18/2012    COLONOSCOPY N/A 11/23/2021    Dr Britni Navas[de-identified] 2 polyps in cecum 10 mm polyp mid sigmoid, mild diverticulosis    SPINAL FUSION  10/2006    SPINE SURGERY  2008    l5-s1 hardware removal    TYMPANOSTOMY TUBE PLACEMENT Bilateral 04/09/2018 LEFT PRESSURE EQUALIZATION TUBE INSERTION AND EXAM UNDER ANESTHESIA OF NASOPHARYNX    VASECTOMY  2019   ,   Social History     Tobacco Use    Smoking status: Current Every Day Smoker     Packs/day: 1.00     Years: 43.00     Pack years: 43.00     Types: Cigarettes     Start date: 1977    Smokeless tobacco: Never Used   Substance Use Topics    Alcohol use:  Yes     Alcohol/week: 1.0 - 2.0 standard drink     Types: 1 - 2 Shots of liquor per week     Comment: former heavy use; recovering alcoholic since , approx    Drug use: No   ,   Family History   Problem Relation Age of Onset    Heart Disease Mother     High Blood Pressure Mother     Cancer Mother         female   [address] Mother 78        stage 4, age 78 after 2-6 months, 3/2021   ,   Immunization History   Administered Date(s) Administered    COVID-19, Pfizer Purple top, DILUTE for use, 12+ yrs, 30mcg/0.3mL dose 2021, 2021, 10/07/2021   ,   Health Maintenance   Topic Date Due    DTaP/Tdap/Td vaccine (1 - Tdap) Never done    Shingles vaccine (1 of 2) Never done    Low dose CT lung screening  04/15/2022    Flu vaccine (Season Ended) 2022 (Originally 2022)    Pneumococcal 0-64 years Vaccine (1 - PCV) 2022 (Originally 10[birth year]9/1968)    Lipids  2022    Prostate Specific Antigen (PSA) Screening or Monitoring  2022    Depression Monitoring  2023    Diabetes screen  2024    Colorectal Cancer Screen  2031    COVID-19 Vaccine  Completed    HIV screen  Completed    Hepatitis A vaccine  Aged Out    Hepatitis B vaccine  Aged Out    Hib vaccine  Aged Out    Meningococcal (ACWY) vaccine  Aged Out    Hepatitis C screen  Discontinued       PHYSICAL EXAMINATION:  [ INSTRUCTIONS:  \"[x]\" Indicates a positive item  \"[]\" Indicates a negative item  -- DELETE ALL ITEMS NOT EXAMINED]  Vital Signs: (As obtained by patient/caregiver or practitioner observation)    Respiratory rate-normal Constitutional: [x] Appears well-developed and well-nourished [x] No apparent distress      [] Abnormal-   Mental status  [x] Alert and awake  [x] Oriented to person/place/time [x]Able to follow commands      Eyes:  EOM    [x]  Normal  [] Abnormal-  Sclera  [x]  Normal  [] Abnormal -         Discharge [x]  None visible  [] Abnormal -    HENT:   [x] Normocephalic, atraumatic. [] Abnormal   [] Mouth/Throat: Mucous membranes are moist.     External Ears [x] Normal  [] Abnormal-     Neck: [x] No visualized mass     Pulmonary/Chest: [x] Respiratory effort normal.  [] No visualized signs of difficulty breathing or respiratory distress        [] Abnormal-      Musculoskeletal:   [] Normal gait with no signs of ataxia         [x] Normal range of motion of neck        [] Abnormal-       Neurological:        [x] No Facial Asymmetry (Cranial nerve 7 motor function) (limited exam to video visit)          [x] No gaze palsy        [] Abnormal-         Skin:        [x] No significant exanthematous lesions or discoloration noted on facial skin         [] Abnormal-            Psychiatric:       [x] Normal Affect [x] No Hallucinations        [] Abnormal-     Other pertinent observable physical exam findings-     ASSESSMENT/PLAN:  1. Chronic left-sided low back pain with left-sided sciatica  OARRS report reviewed and no inconsistencies noted   The patient understands the risks of dependency/addiction with set and will take as little as possible and discontinue as soon as possible   - Coretta Schultz MD, Orthopedic Surgery (Spine), [facility]    2. Essential hypertension  Patient is not checking blood pressure. He was advised to start checking. He is also due to be seen in the office in 3 months for repeat checkup  - CBC with Auto Differential; Future  - Comprehensive Metabolic Panel; Future  - Lipid Panel; Future  - TSH with Reflex; Future    3.  COVID-19  Symptomatic treatment as patient is 10 days or more into the illness. 4. Moderate episode of recurrent major depressive disorder [facility])  Patient states this is not a current issue. He is on no medication    5. Prostate cancer screening  - PSA Screening; Future    6. Tobacco abuse  Smoking cessation discussed with the patient       7. Pulmonary emphysema, unspecified emphysema type ([address]  Smoking cessation discussed with the patient       8. Sensation of plugged ear on both sides  Flonase nasal spray. ENT if not better      No follow-ups on file. Mimi Rizo is a 63 y.o. male being evaluated by a Virtual Visit (video visit) encounter to address concerns as mentioned above. A caregiver was present when appropriate. Due to this being a TeleHealth encounter (During HYZEB-83 public health emergency), evaluation of the following organ systems was limited: Vitals/Constitutional/EENT/Resp/CV/GI//MS/Neuro/Skin/Heme-Lymph-Imm. Pursuant to the emergency declaration under the [address] [place] [address] authority and the [employer] and [employer] Act, this Virtual Visit was conducted with patient's (and/or legal guardian's) consent, to reduce the patient's risk of exposure to COVID-19 and provide necessary medical care. The patient (and/or legal guardian) has also been advised to contact this office for worsening conditions or problems, and seek emergency medical treatment and/or call 911 if deemed necessary. Patient identification was verified at the start of the visit: Yes    Total time spent on this encounter: 30 minutes    Services were provided through a video synchronous discussion virtually to substitute for in-person clinic visit. Patient and provider were located at their individual homes. --Silva Greenfield MD on 6/21/2022 at 2:29 PM    An electronic signature was used to authenticate this note.

## 2022-06-22 ENCOUNTER — TELEPHONE (OUTPATIENT)
Dept: CASE MANAGEMENT | Age: 62
End: 2022-06-22

## 2022-06-22 NOTE — TELEPHONE ENCOUNTER
Patient due for annual CT Lung Screening. Reminder letter mailed. Smoking cessation information sent.

## 2022-07-07 ENCOUNTER — OFFICE VISIT (OUTPATIENT)
Dept: ENT CLINIC | Age: 62
End: 2022-07-07
Payer: MEDICARE

## 2022-07-07 VITALS — SYSTOLIC BLOOD PRESSURE: 113 MMHG | DIASTOLIC BLOOD PRESSURE: 65 MMHG | HEART RATE: 95 BPM | TEMPERATURE: 97.7 F

## 2022-07-07 DIAGNOSIS — J30.9 ALLERGIC RHINITIS, UNSPECIFIED SEASONALITY, UNSPECIFIED TRIGGER: ICD-10-CM

## 2022-07-07 DIAGNOSIS — H91.93 BILATERAL HEARING LOSS, UNSPECIFIED HEARING LOSS TYPE: ICD-10-CM

## 2022-07-07 DIAGNOSIS — J34.89 INTERNAL NASAL LESION: ICD-10-CM

## 2022-07-07 DIAGNOSIS — H65.23 BILATERAL CHRONIC SEROUS OTITIS MEDIA: Primary | ICD-10-CM

## 2022-07-07 DIAGNOSIS — J34.2 DNS (DEVIATED NASAL SEPTUM): ICD-10-CM

## 2022-07-07 DIAGNOSIS — J34.89 NASAL MUCOSA DRY: ICD-10-CM

## 2022-07-07 DIAGNOSIS — H93.12 TINNITUS OF LEFT EAR: ICD-10-CM

## 2022-07-07 PROCEDURE — G8427 DOCREV CUR MEDS BY ELIG CLIN: HCPCS | Performed by: STUDENT IN AN ORGANIZED HEALTH CARE EDUCATION/TRAINING PROGRAM

## 2022-07-07 PROCEDURE — 3017F COLORECTAL CA SCREEN DOC REV: CPT | Performed by: STUDENT IN AN ORGANIZED HEALTH CARE EDUCATION/TRAINING PROGRAM

## 2022-07-07 PROCEDURE — 31231 NASAL ENDOSCOPY DX: CPT | Performed by: STUDENT IN AN ORGANIZED HEALTH CARE EDUCATION/TRAINING PROGRAM

## 2022-07-07 PROCEDURE — G8417 CALC BMI ABV UP PARAM F/U: HCPCS | Performed by: STUDENT IN AN ORGANIZED HEALTH CARE EDUCATION/TRAINING PROGRAM

## 2022-07-07 PROCEDURE — 99203 OFFICE O/P NEW LOW 30 MIN: CPT | Performed by: STUDENT IN AN ORGANIZED HEALTH CARE EDUCATION/TRAINING PROGRAM

## 2022-07-07 PROCEDURE — 4004F PT TOBACCO SCREEN RCVD TLK: CPT | Performed by: STUDENT IN AN ORGANIZED HEALTH CARE EDUCATION/TRAINING PROGRAM

## 2022-07-07 RX ORDER — ECHINACEA PURPUREA EXTRACT 125 MG
2 TABLET ORAL 4 TIMES DAILY
Qty: 1 EACH | Refills: 3 | Status: SHIPPED | OUTPATIENT
Start: 2022-07-07

## 2022-07-07 NOTE — PROGRESS NOTES
[address] [facility] SURGERY  NEW PATIENT HISTORY AND PHYSICAL NOTE      Patient Name: Micky Hill  Medical Record Number:  [id number]  Primary Care Physician:  Arlene Puente MD    ChiefComplaint     Chief Complaint   Patient presents with    Ear Problem     ears feel clogged with fluid, Lt side feels more clogged than Rt, would like tubes in his ears       History of Present Illness     Micky Hill is an 63 y.o. male presenting with ear fullness for the past couple weeks. Feels like ears have filled up with fluid like they have in the past when Dr. Imani Rodrigues placed ear tubes. Constant nasal congestion and nasal sniffling. He has been using Flonase with minimal relief. No mucopurulent drainage. No facial pain or pressure. Left sided ear pain, described as intermittent piercing. + non pulsatile left sided tinnitus currently. Only history of otologic surgery was shobha ear tube placement of both ears by Dr. Imani Rodrigues. No family history of early onset hearing loss. +loud noise exposures - rode a motorcycle in the past and loud music. Denies vertigo. No known environmental allergies. PCP placed him on Flonase which did not resolved his issue, used an entire botle. Going [facility] the [facility] Jan 11, wants to make sure he has ear tubes placed before this time. .     4/9/2018 (Dr. Diallo Friday) OPERATION PERFORMED:  Bilateral myringotomy, insertion of PE tubes and exam of nasopharynx under anesthesia.       Past Medical History     Past Medical History:   Diagnosis Date    Alcoholism ([address] 07/14/1999    Anxiety     Back pain     Hyperlipidemia     Low HDL (under 40)     Prolonged emergence from general anesthesia     \"hard time breathing when waking up'    Spinal arthritis 2005       Past Surgical History     Past Surgical History:   Procedure Laterality Date    COLONOSCOPY  10/18/2012    COLONOSCOPY N/A 11/23/2021    Dr Nicolas Ashraf[de-identified] 2 polyps in cecum 10 mm polyp mid sigmoid, mild diverticulosis    SPINAL FUSION  10/2006    SPINE SURGERY      l5-s1 hardware removal    TYMPANOSTOMY TUBE PLACEMENT Bilateral 2018    LEFT PRESSURE EQUALIZATION TUBE INSERTION AND EXAM UNDER ANESTHESIA OF NASOPHARYNX    VASECTOMY  2019       Family History     Family History   Problem Relation Age of Onset    Heart Disease Mother     High Blood Pressure Mother     Cancer Mother         female   [facility] Mother 78        stage 4, age 78 after 2-6 months, 3/2021       Social History     Social History     Tobacco Use    Smoking status: Current Every Day Smoker     Packs/day: 1.00     Years: 43.00     Pack years: 43.00     Types: Cigarettes     Start date: 1977    Smokeless tobacco: Never Used   Substance Use Topics    Alcohol use: Yes     Alcohol/week: 1.0 - 2.0 standard drink     Types: 1 - 2 Shots of liquor per week     Comment: former heavy use; recovering alcoholic since , approx    Drug use: No        Allergies     Allergies   Allergen Reactions    Nucynta [Tapentadol] Anaphylaxis     Hallucinations. Went to hospital.   [employer] Vicodin [Hydrocodone-Acetaminophen]      Itching      Codeine Rash     itching    Tylenol [Acetaminophen] Rash     Itching, pt states he is able to take       Medications     Current Outpatient Medications   Medication Sig Dispense Refill    sodium chloride (OCEAN) 0.65 % nasal spray 2 sprays by Nasal route 4 times daily 1 each 3    oxyCODONE-acetaminophen (PERCOCET) 7.5-325 MG per tablet Take 1 tablet by mouth every 6 hours as needed for Pain for up to 30 days. Intended supply: 30 days 120 tablet 0    amLODIPine (NORVASC) 5 MG tablet TAKE 1 TABLET BY MOUTH EVERY DAY 90 tablet 2    atorvastatin (LIPITOR) 10 MG tablet TAKE 1 TABLET BY MOUTH EVERY DAY 90 tablet 2    hydrOXYzine (ATARAX) 50 MG tablet Take 50 mg by mouth nightly       No current facility-administered medications for this visit.        Review of Systems     REVIEW OF SYSTEMS    See HPI Above    PhysicalExam     Vitals:    07/07/22 1434   BP: 113/65   Site: Left Upper Arm   Position: Sitting   Cuff Size: Medium Adult   Pulse: 95   Temp: 97.7 °F (36.5 °C)   TempSrc: Temporal       PHYSICAL EXAM  /65 (Site: Left Upper Arm, Position: Sitting, Cuff Size: Medium Adult)   Pulse 95   Temp 97.7 °F (36.5 °C) (Temporal)     GENERAL: No acute distress, alert and oriented  EYES: EOMI, Anti-icteric  NOSE: On anterior rhinoscopy there is no epistaxis, nasal mucosa dry with white lesion at the head of the left middle turbinate, no purulent drainage. Nasal septum deviated to the right. EARS: Normal external appearance; on portable otomicroscopy:     -Ad: External auditory canal without stenosis, tympanic membrane intact with clear serous effusion      -As: External auditory canal without stenosis, tympanic membrane intact with clear serous effusion  Pneumatic otoscopy: Bilateral tympanic membranes with diminished mobility on pneumatic otoscopy  FACE: HB 1/6 bilaterally, symmetric appearing, sensation equal bilaterally  ORAL CAVITY: No masses or lesions visualized or palpated, uvula is midline, moist mucous membranes, no oropharyngeal masses or oropharyngeal obstruction  NECK: Normal range of motion, no thyromegaly, trachea is midline, no palpable lymphadenopathy or neck masses, no crepitus  NEURO: Cranial Nerves 2, 3, 4, 5, 6, 7, 11, 12 grossly intact bilaterally     I have performed a head and neck physical exam personally or was physically present during the key or critical portions of the service. Nasal Endoscopy, Diagnostic ([address])    Pre-op: Bilateral chronic serous otitis media, internal nasal lesion  Post-op: Bilateral chronic serous otitis media, dry nasal mucosa  Procedure: Nasal endoscopy    After obtaining verbal consent from the patient 2% lidocaine with afrin was sprayed into the nasal cavities.   After allowing a time for anesthesia, a 0-degree rigid nasal endoscope was used to examine the nasal septum, turbinates, and mucosal tissue. The middle meatus and sphenoethmoid recess was examined bilaterally. here were no complications. Pertinent findings include: The right lesion at the head of the left middle turbinate was noted to be a piece of very dry nasal mucosa that was removed with alligator forceps. Nasal septum deviated to the right posteriorly. Bilateral eustachian tube orifices patent. No nasopharyngeal masses. *The patient tolerated the procedure well without complication. I attest that I was present for and did the entire procedure myself. Assessment and Plan     1. Bilateral chronic serous otitis media  2. Bilateral hearing loss, unspecified hearing loss type  3. Tinnitus of left ear  -Will return for comprehensive audiological evaluation and consideration of PE tube placement and possible eustachian tube ballooning based off of audiometric evaluation    4. Allergic rhinitis, unspecified seasonality, unspecified trigger  5. DNS (deviated nasal septum)  6. Nasal mucosa dry  -Stop Flonase use. Start sinonasal saline rinses and irrigations on a daily basis multiple times a day to help moisturize nasal mucosa. 7. Internal nasal lesion  - Nasal lesion noted on anterior rhinoscopy of left middle turbinate noted to be very dry nasal crusting on endoscopy and debrided. Follow Up     Return for audiogram prior to appointment. Dr. Catherine Guzman [place] [address]  Department of Otolaryngology/Head & Neck Surgery  7/7/22    Medical Decision Making: The following items were considered in medical decision making:  Independent review of images  Review / order clinical lab tests  Review / order radiology tests  Decision to obtain old records    This note was generated completely or in part utilizing Dragon dictation speech recognition software.   Occasionally, words are mistranscribed and despite editing, the text may contain inaccuracies due to incorrect word recognition. If further clarification is needed please contact the office at [phone].

## 2022-07-08 ENCOUNTER — TELEPHONE (OUTPATIENT)
Dept: ENT CLINIC | Age: 62
End: 2022-07-08

## 2022-07-08 NOTE — TELEPHONE ENCOUNTER
Pt.states is it possible if he can come in any sooner he states he has fluid in the ear and waiting a month to be seen will make it worse.      Next Apt: 8/8/22

## 2022-07-08 NOTE — TELEPHONE ENCOUNTER
Spoke with patient and advised him he could call the [facility] location to get an earlier date for a hearing eval. He stated that he will not be coming back to see Dr. Santiago Cortes and he would look else where for an ENT

## 2022-07-11 ENCOUNTER — PROCEDURE VISIT (OUTPATIENT)
Dept: AUDIOLOGY | Age: 62
End: 2022-07-11
Payer: MEDICARE

## 2022-07-11 DIAGNOSIS — H90.A22 SENSORINEURAL HEARING LOSS (SNHL) OF LEFT EAR WITH RESTRICTED HEARING OF RIGHT EAR: ICD-10-CM

## 2022-07-11 DIAGNOSIS — H90.A32 MIXED CONDUCTIVE AND SENSORINEURAL HEARING LOSS OF LEFT EAR WITH RESTRICTED HEARING OF RIGHT EAR: Primary | ICD-10-CM

## 2022-07-11 DIAGNOSIS — H91.90 HEARING LOSS, UNSPECIFIED HEARING LOSS TYPE, UNSPECIFIED LATERALITY: Primary | ICD-10-CM

## 2022-07-11 DIAGNOSIS — H93.12 TINNITUS OF LEFT EAR: ICD-10-CM

## 2022-07-11 PROCEDURE — 92567 TYMPANOMETRY: CPT | Performed by: AUDIOLOGIST

## 2022-07-11 PROCEDURE — 92557 COMPREHENSIVE HEARING TEST: CPT | Performed by: AUDIOLOGIST

## 2022-07-11 NOTE — PATIENT INSTRUCTIONS
If you are interested in pursuing hearing aids, here are the next steps:    1) Contact your insurance and ask, Do I have a benefit for hearing aids?    If the answer is no hearing aids will be a 100% out of pocket expense   If the answer is yes, ask Can I use this benefit at [facility] ([facility])?  or Where can I use this benefit?  If [facility] is not in-network for hearing aids, your insurance should be able to provide the appropriate contact information for an in-network provider. 2) Call [facility] ([phone]) to schedule a Hearing Aid Evaluation    This appointment is when we will discuss hearing aid options and decide which device is most appropriate for you. Learning About Hearing Aids  What is a hearing aid? A hearing aid makes sounds louder. It can help some people with hearing problems to hear better. Hearing aids do not restore normal hearing. But they can make it easier to communicate by making sounds clearer. · Digital programmable hearing aids can adjust themselves to work best where you are at any time. You also have more choices in setting them up than with analog hearing aids. There are also different styles of hearing aids. · A behind-the-ear (BTE) hearing aid connects to a plastic ear mold that fits inside the outer ear. BTE hearing aids are used for all levels of hearing loss, especially very severe hearing loss. They may be better for children for safety and growth reasons. Poorly fitting BTE ear molds or a buildup of earwax may cause a whistling sound (feedback). · An in-the-ear (ITE) hearing aid fits in the outer part of the ear. It can be used by people with mild to severe hearing loss. ITE hearing aids can be used with other hearing devices, such as a telecoil that improves hearing during phone calls. ITE hearing aids can be damaged by earwax and fluid draining from the ear. Their small size may be hard for some people to handle.  They are not often used in children because the case must be replaced as the child grows. · An in-the-canal (ITC) hearing aid fits into the ear canal. ITC hearing aids are used by people with mild to moderate hearing loss. They are made to fit the shape and the size of your ear canal. They can be damaged by earwax and fluid draining from the ear. Their small size may be hard for some people to handle. They are not made for children. You have some options if you have a hearing problem and are thinking about getting hearing aids. You can go to your doctor or an audiologist. He or she will do a hearing test and help you decide which type and style of hearing aid may be best for you. What else should I know about hearing aids? Find out if your insurance covers hearing aids. They can be expensive. Different types of hearing aids come with different costs. Also find out about a warranty or return policy in case you are not happy with your hearing aids. Follow-up care is a key part of your treatment and safety. Be sure to make and go to all appointments, and call your doctor if you are having problems. It's also a good idea to know your test results and keep a list of the medicines you take. Where can you learn more? Go to https://[email or web address].Shortlist. org and sign in to your [employer] account. Enter H097 in the [facility] box to learn more about \"Learning About Hearing Aids. \"     If you do not have an account, please click on the \"Sign Up Now\" link. Current as of: October 21, 2018  Content Version: 12.1  © 1606-0192 Healthwise, [employer]orporated. Care instructions adapted under license by [facility] ([facility]). If you have questions about a medical condition or this instruction, always ask your healthcare professional. [place] [address] any warranty or liability for your use of this information.       Good Communication Strategies    Communication can be challenging for anyone, but can be especially difficult for those with some degree of hearing loss. While we may not be able to control every factor that may lead to difficulty with communication, there are Good Communication Strategies that we can all use in our day-to-day lives. Communication takes both parties working together for it to be successful. Tips as a Listener:   1. Control your environment. It is important to limit the amount of background noise in the room when possible. You should also consider having a good light source in the room to best see the other person. 2. Ask for clarification. Instead of saying \"What?\", you can use parts of what you heard to make a new question. For example, if you heard the word \"Thursday\" but not the rest of the week, you may ask \"What was that about Thursday? \" or \"What did you want to do Thursday? \". This shows the person talking that you are listening and will help them better explain what they are saying. 3. Be an advocate for yourself. If you are hearing but not understanding, tell the other person \"I can hear you, but I need you to slow down when you speak. \"  Or if someone is facing the other direction, say \"I cannot hear you when you are not looking at me when we talk. \"       Tips as a Talker:   - Sit or stand 3 to 6 feet away to maximize audibility         -- It is unrealistic to believe someone else will fully hear your message if you are speaking from across the room or in a different room in the house   - Stay at eye level to help with visual cues   - Make sure you have the persons attention before speaking   - Use facial expressions and gestures to accentuate your message   - Raise your voice slightly (do not scream)   - Speak slowly and distinctly   - Use short, simple sentences   - Rephrase your words if the person is having a hard time understanding you    - To avoid distortion, dont speak directly into a persons ear      Some additional items that may be helpful:   - Remain patient - this is important for both parties   - Write down items that still cannot be heard/understood. You may write with pen/paper or consider typing/texting on a cell phone or smart device. - If background noise is unavoidable, try to keep yourself in a good position in the room. By sitting at a barron on the side of the restaurant (preferably a corner), it will be easier to communicate than if you were sitting at a table in the middle with background noise surrounding you. Keep yourself positioned away from music speakers or heavy foot traffic. Tinnitus: Overview and Management Strategies          Many people have some ringing sounds in their ears once in a while. You may hear a roar, a hiss, a tinkle, or a buzz. The sound usually lasts only a few minutes. If it goes on all the time, you may have tinnitus. Tinnitus is usually caused by long-term exposure to loud noise. This damages the nerves in the inner ear. It can occur with all types of hearing loss. It may be a symptom of almost any ear problem. Tinnitus may be caused by a buildup of earwax. Or, it may be caused by ear infections or certain medicines (especially antibiotics or large amounts of aspirin). You can also hear noises in your ears because of an injury to the ears, drinking too much alcohol or caffeine, or a medical condition. Other conditions may also contribute to tinnitus, including: head and neck trauma, temporomandibular joint disorder (TMJ), sinus pressure and barometric trauma, traumatic brain injury, metabolic disorders, autoimmune disorders, stress, and high blood pressure. You may need tests to evaluate your hearing and to find causes of long-lasting tinnitus. Your doctor may suggest one or more treatments to help you cope with the tinnitus. You can also do things at home to help reduce symptoms. Causes of Tinnitus  We do not know the exact cause of tinnitus. One thing we do know is that you are not imagining it.  If you have tinnitus, chances are the cause will remain a mystery. Conditions that might cause tinnitus include the following:    Hearing loss    Ménière's disease    Loud noise exposure    Migraines    Head injury    Drugs or medicines that are toxic to hearing    Anemia    High blood pressure    Stress    A lot of wax in the ear    Certain types of tumors    Having a lot of caffeine    Smoking cigarettes  You may find that your tinnitus is worse at night. This happens because it is quiet and you are not distracted. Feeling tired and stressed may make your tinnitus worse. Follow-up care is a key part of your treatment and safety. Be sure to make and go to all appointments, and call your doctor if you are having problems. It's also a good idea to know your test results and keep a list of the medicines you take. How can you care for yourself at home? · Limit or cut out alcohol, caffeine, and sodium. They can make your symptoms worse. · Do not smoke or use other tobacco products. Nicotine reduces blood flow to the ear and makes tinnitus worse. If you need help quitting, talk to your doctor about stop-smoking programs and medicines. These can increase your chances of quitting for good. · Talk to your doctor about whether to stop taking aspirin and similar products such as ibuprofen or naproxen. · Get exercise often. It can help improve blood flow to the ear. Hearing Aids and Other Devices  A hearing aid may help your tinnitus if you have a hearing loss. An audiologist can help you find and use the best hearing aid for you. Tinnitus maskers look like hearing aids. They make a sound that masks, or covers up, the tinnitus. The masking sound distracts you from the ringing in your ears. You may be able to use a masker and a hearing aid at the same time. Sound machines can be useful at night or during quiet times. There are machines you can buy at the store.  Or, you can find apps on your phone that make sounds, like understanding simple statements. ? Sudden problems with walking or balance. ? A sudden, severe headache that is different from past headaches. Call your doctor now or seek immediate medical care if:    · You develop other symptoms. These may include hearing loss (or worse hearing loss), balance problems, dizziness, nausea, or vomiting. Watch closely for changes in your health, and be sure to contact your doctor if:    · Your tinnitus moves from both ears to one ear. · Your hearing loss gets worse within 1 day after an ear injury. · Your tinnitus or hearing loss does not get better within 1 week after an ear injury. · Your tinnitus bothers you enough that you want to take medicines to help you cope with it. If you notice changes in your tinnitus and/or your hearing, it is recommended that you have your hearing tested by your audiologist and to follow-up with your physician that manages your hearing loss (such as your ENT or Primary Care doctor). Hearing Loss: Care Instructions  Your Care Instructions      Hearing loss is a sudden or slow decrease in how well you hear. It can range from mild to profound. Permanent hearing loss can occur with aging, and it can happen when you are exposed long-term to loud noise. Examples include listening to loud music, riding motorcycles, or being around other loud machines. Hearing loss can affect your work and home life. It can make you feel lonely or depressed. You may feel that you have lost your independence. But hearing aids and other devices can help you hear better and feel connected to others. Follow-up care is a key part of your treatment and safety. Be sure to make and go to all appointments, and call your doctor if you are having problems. It's also a good idea to know your test results and keep a list of the medicines you take. How can you care for yourself at home? · Avoid loud noises whenever possible.  This helps keep your hearing from getting worse. Always wear hearing protection around loud noises. · If appropriate, wear hearing aid(s) as directed. It is recommended that hearing aids are worn during all waking hours to keep your brain active and give it access to the sounds it is missing. · If you are beginning your process with hearing aid(s), schedule a \"Hearing Aid Evaluation\" with an audiologist to discuss your lifestyle, features of hearing aid technology, and styles of hearing aids available. It is recommended that you contact your insurance company to determine if you have a hearing aid benefit, as this may dictate who you can see for these services. · Have hearing tests as your doctor suggests. They can show whether your hearing has changed. Your hearing aid may need to be adjusted. · Use other assistive devices as needed. These may include:  ? Telephone amplifiers and hearing aids that can connect to a television, stereo, radio, or microphone. ? Devices that use lights or vibrations. These alert you to the doorbell, a ringing telephone, or a baby monitor. ? Television closed-captioning. This shows the words at the bottom of the screen. Most new TVs can do this. ? TTY (text telephone). This lets you type messages back and forth on the telephone instead of talking or listening. These devices are also called TDD. When messages are typed on the keyboard, they are sent over the phone line to a receiving TTY. The message is shown on a monitor. · Use pagers, fax machines, text, and email if it is hard for you to communicate by telephone. · Try to learn a listening technique called speech-reading. It is not lip-reading. You pay attention to people's gestures, expressions, posture, and tone of voice. These clues can help you understand what a person is saying. Face the person you are talking to, and have him or her face you. Make sure the lighting is good. You need to see the other person's face clearly.   · Think about counseling if you need help to adjust to your hearing loss. When should you call for help? Watch closely for changes in your health, and be sure to contact your doctor if:    · You think your hearing is getting worse. · You have new symptoms, such as dizziness or nausea. Noise-Induced Hearing Loss  What it is, and what you can do to prevent it      Exposure to loud sounds, in an occupational setting or recreational, can cause permanent hearing loss. Sound is measured in decibels (dB). Noise-induced hearing loss is the ONLY type of preventable hearing loss. Hearing loss related to noise exposure can occur at any age. There are small sensory cells, called inner and outer hair cells, within the inner ear (cochlea). These cells process the loudness (intensity) and pitch (frequency) of sound and send the signal to the brain via our auditory nerve (vestibulocochlear nerve, cranial nerve VIII). When these cells are damaged, they can result in permanent hearing loss and/or tinnitus. The hair cells responsible for high frequency sounds, like birds chirping, are most likely to be damaged due to loud sounds. The high frequency sounds are also very important for our clarity and understanding of speech. OCCUPATIONAL NOISE EXPOSURE RECREATIONAL NOISE EXPOSURE   Some jobs may have exposure to loud sounds in the workplace. These jobs may include but are not limited to:  [employer]   Construction   Welding   Landscaping   Hairdressing/hairstyling   Musicians  [employer]    ... And more! Many activities outside of work may cause permanent hearing loss. These activities may include but are not limited to:    Lawnmowers, leaf blowers  [employer] (such as pigs squealing)   Chainsaws and other power tools  [employer] musical instruments and/or singing   Listening to music too loudly - at concerts, through stereo, through ear buds or headphones   Attending sporting events   Attending fireworks shows or using fireworks at home  [employer] of firearms   . .. And more! REDUCE OR PROTECT YOUR EARS FROM NOISE EXPOSURE    To do your best to avoid noise-induced hearing loss, here are some tips:   Limit exposure to loud sounds. 85 dB (decibels) is safe for 8 hours. As sounds are louder, the length of time the sound is safe lessens. These numbers are cumulative across a 24-hour period. (NIOSH and CDC, 2002)  o 85 dB is safe for 8 hours  o 88 dB is safe for 4 hours  o 91 dB is safe for 2 hours  o 94 dB is safe for 1 hour  o 97 dB is safe for 30 minutes  o 100 dB is safe for 15 minutes  o 103 dB is safe for 7.5 minutes  o 106 dB is safe for 3.75 minutes  o 109 dB is safe for LESS THAN 2 minutes  o 112 dB is safe for LESS THAN 1 minute  o 115 dB is safe for ~ 30 seconds  o 130 dB can cause IMMEDIATE hearing loss   If you are unsure if a sound is too loud, consider checking the sound level with a \"sound level meter\". There are apps on smart devices that can measure the loudness of the sound. They are not as accurate as expensive equipment used by scientists, but it will give you a guesstimate of how loud the sound is, and if it may be damaging to your hearing.  If you cannot avoid loud sounds, here are ways to reduce your exposure:  o 1. Wear hearing protection  - Ear plugs and protective ear muffs can be used to reduce the intensity of the sound. The higher the NRR (noise reduction rating), the better reduction of the intensity of the sound   o 2. Turn the volume down  - When listening to music, turn the volume down, especially when wearing ear buds or headphones. A good rule of thumb is to not go beyond the middle setting on your device. If you can't hear someone talking to you from arm's length away, your music may be at a level that it can cause damage.   If someone else can hear your music from 3 feet away, it may also be at a level that

## 2022-07-11 NOTE — PROGRESS NOTES
Stephanie Alejandro   1960, 63 y.o. male   [id number]       Referring Provider: Jerel Zaidi DO  Referral Type: In an order in [address] [place] [address]    Reason for Visit: Evaluation of suspected change in hearing, tinnitus, or balance. ADULT AUDIOLOGIC EVALUATION      Stephanie Alejandro is a 63 y.o. male seen today, 7/11/2022 , for an initial audiologic evaluation. Patient was seen by Jerel Zaidi DO following today's evaluation. AUDIOLOGIC AND OTHER PERTINENT MEDICAL HISTORY:      Stephanie Alejandro noted otalgia, aural fullness, tinnitus, history of recreational noise exposure and history of ear surgery. Patient reports a history of bilateral PE tubes. He states his ears feel like they are \"full of fluid\" and notes intermittent left otalgia. Patient also notes left tinnitus that is intermittent in nature. He notes this sensation bean a few weeks ago. Of note, patient reports significant noise exposure from riding a motorcycle and listening to loud music. Stephanie Alejandro denied otorrhea, dizziness, imbalance, history of falls, history of head trauma and family history of hearing loss. Date: 7/11/2022     IMPRESSIONS:      AD: Hearing WNL sloping to Moderate SNHL, Excellent WRS, Type C tymp  AS: Mild sloping to Severe MHL, Excellent WRS, Type B tymp    Test results consistent with right Sensorineural hearing loss and left Mixed hearing loss. Hearing loss significant enough to create hearing difficulty in some listening situations. Discussed hearing loss, tinnitus, hearing aids and NIHL with patient. Patient to follow medical recommendations per Jerel Zaidi DO .    ASSESSMENT AND FINDINGS:     Otoscopy revealed: Clear ear canals bilaterally    RIGHT EAR:  Hearing Sensitivity: Normal hearing sensitivity to  Moderate   Sensorineural hearing loss  Speech Recognition Threshold: 20 dB HL  Word Recognition:Excellent (100%), based on NU-6 25-word list at 65m dBHL using recorded speech stimuli.     Tympanometry: Negative peak pressure with normal compliance, Type C tympanogram, consistent with ETD/history of otitis media. Acoustic Reflexes: Ipsilateral: Could not maintain seal. Contralateral: Could not maintain seal.    LEFT EAR:  Hearing Sensitivity: Mild to Severe Mixed hearing loss  Speech Recognition Threshold: 35 dB HL  Word Recognition:Excellent (100%), based on NU-6 25-word list at 80m dBHL using recorded speech stimuli. Tympanometry: Flat, no peak pressure or compliance, Type B tympanogram, with typical ear canal volume, consistent with middle ear fluid. Acoustic Reflexes: Ipsilateral: Could not maintain seal. Contralateral: Could not maintain seal.    Reliability: Good  Transducer: HF Headphones    See scanned audiogram dated 7/11/2022  for results. PATIENT EDUCATION:     The following items were discussed with the patient:   - Good Communication Strategies  - Hearing Loss and Hearing Aids  - Tinnitus Management Strategies    - Noise-Induced Hearing Loss and use of Hearing Protection Devices (HPDs)     Educational information was shared in the After Visit Summary. RECOMMENDATIONS:                                                                                                                                                                                                                                                          The following items are recommended based on patient report and results from today's appointment:   - Continue medical follow-up with Hayden Morales DO.   - Retest hearing as medically indicated and/or sooner if a change in hearing is noted. - If desired, schedule a Hearing Aid Evaluation (HAE) appointment to discuss hearing aid options. - Utilize \"Good Communication Strategies\" as discussed to assist in speech understanding with communication partners.   - Maintain a sound enriched environment to assist in the management of tinnitus symptoms.  - Use hearing protection devices (HPDs), such as protective ear muffs and ear plugs, when exposed to dangerous sound levels.        Aftab Bazzi  Audiologist    Chart CC'd to: Vashti Patino,       Degree of   Hearing Sensitivity dB Range   Within Normal Limits (WNL) 0 - 20   Mild 20 - 40   Moderate 40 - 55   Moderately-Severe 55 - 70   Severe 70 - 90   Profound 90 +

## 2022-07-11 NOTE — Clinical Note
Dr. Noble Underwood,    Please see note from this patient's audiogram from today. Patient expressed an interest in seeing you for follow up sooner than the scheduled appointment on 8/8/22. I told the patient to contact your office to see if this can be re-scheduled. Please let me know if there is anything further you need.         Aftab Blake  Audiologist

## 2022-07-12 ENCOUNTER — OFFICE VISIT (OUTPATIENT)
Dept: ENT CLINIC | Age: 62
End: 2022-07-12
Payer: MEDICARE

## 2022-07-12 VITALS — DIASTOLIC BLOOD PRESSURE: 78 MMHG | TEMPERATURE: 97.7 F | SYSTOLIC BLOOD PRESSURE: 127 MMHG | HEART RATE: 69 BPM

## 2022-07-12 DIAGNOSIS — H69.83 DYSFUNCTION OF BOTH EUSTACHIAN TUBES: ICD-10-CM

## 2022-07-12 DIAGNOSIS — H65.22 CHRONIC SEROUS OTITIS MEDIA OF LEFT EAR: Primary | ICD-10-CM

## 2022-07-12 DIAGNOSIS — J30.9 ALLERGIC RHINITIS, UNSPECIFIED SEASONALITY, UNSPECIFIED TRIGGER: ICD-10-CM

## 2022-07-12 PROCEDURE — 69433 CREATE EARDRUM OPENING: CPT | Performed by: STUDENT IN AN ORGANIZED HEALTH CARE EDUCATION/TRAINING PROGRAM

## 2022-07-12 RX ORDER — OFLOXACIN 3 MG/ML
SOLUTION/ DROPS OPHTHALMIC
Qty: 1 EACH | Refills: 0 | Status: SHIPPED | OUTPATIENT
Start: 2022-07-12 | End: 2022-09-20

## 2022-07-12 RX ORDER — FLUTICASONE PROPIONATE 50 MCG
2 SPRAY, SUSPENSION (ML) NASAL DAILY
Qty: 48 G | Refills: 1 | Status: SHIPPED | OUTPATIENT
Start: 2022-07-12

## 2022-07-12 NOTE — PROGRESS NOTES
use: Yes     Alcohol/week: 1.0 - 2.0 standard drink     Types: 1 - 2 Shots of liquor per week     Comment: former heavy use; recovering alcoholic since 9221, approx    Drug use: No        Allergies     Allergies   Allergen Reactions    Nucynta [Tapentadol] Anaphylaxis     Hallucinations. Went to hospital.   Tammie Grant Vicodin [Hydrocodone-Acetaminophen]      Itching      Codeine Rash     itching    Tylenol [Acetaminophen] Rash     Itching, pt states he is able to take       Medications     Current Outpatient Medications   Medication Sig Dispense Refill    ofloxacin (OCUFLOX) 0.3 % solution Place 4 drops in each ear twice a day for the next 5 days. 1 each 0    fluticasone (FLONASE) 50 MCG/ACT nasal spray 2 sprays by Each Nostril route daily 48 g 1    sodium chloride (OCEAN) 0.65 % nasal spray 2 sprays by Nasal route 4 times daily 1 each 3    oxyCODONE-acetaminophen (PERCOCET) 7.5-325 MG per tablet Take 1 tablet by mouth every 6 hours as needed for Pain for up to 30 days. Intended supply: 30 days 120 tablet 0    amLODIPine (NORVASC) 5 MG tablet TAKE 1 TABLET BY MOUTH EVERY DAY 90 tablet 2    atorvastatin (LIPITOR) 10 MG tablet TAKE 1 TABLET BY MOUTH EVERY DAY 90 tablet 2    hydrOXYzine (ATARAX) 50 MG tablet Take 50 mg by mouth nightly       No current facility-administered medications for this visit. Review of Systems     REVIEW OF SYSTEM: See HPI above    PhysicalExam     Vitals:    07/12/22 1355   BP: 127/78   Pulse: 69   Temp: 97.7 °F (36.5 °C)   TempSrc: Temporal       PHYSICAL EXAM  /78   Pulse 69   Temp 97.7 °F (36.5 °C) (Temporal)     GENERAL: No acute distress, alert and oriented. EYES: EOMI, Anti-icteric. EARS: Normal external appearance; on portable otomicroscopy:     -Ad: External auditory canal without stenosis, tympanic membrane intact diffuse mild retraction, no evidence of effusion.      -As: External auditory canal without stenosis, tympanic membrane intact with middle ear effusion FACE: HB 1/6 bilaterally, symmetric appearing, sensation equal bilaterally  CHEST: Normal respiratory effort, breathing comfortably, no retractions  SKIN: No rashes, normal appearing skin, no evidence of skin lesions/tumors  NEURO: Cranial Nerves 2, 3, 4, 5, 6, 7, 11, 12 grossly intact bilaterally     I have performed a head and neck physical exam personally or was physically present during the key or critical portions of the service. Procedure     Procedure: Myringotomy with placement of bilateral pressure equalization tube (CPT 13517)    Pre op Dx: Chronic left-sided serous otitis media, bilateral eustachian tube dysfunction  Post Op: Same  Procedure: bilateral myringotomy with tympanostomy tube placement  Consent: Written obtained  Surgeon: Dr. Gama Hightower    Description of procedure:    Consent was obtained after discussion concerning the risk, benefits, and alternatives of the procedure were discussed with the patient. Patient was placed in the reclined position the procedure chair. With the use of an operating microscopy and an otologic speculum, the left external auditory canal was examined. Any cerumen was removed as necessary using instrumentation. The tympanic membranes was visualized, revealing intact dull tympanic membrane with middle ear effusion. Topical phenol was applied to the anterior-inferior quadrant of left-sided tympanic membrane. A myringotomy knife was used to make a radial incision in the tympanic membrane. A 3-suction was used to suction out serous fluid from the middle ear space. An alligator forceps was used to place a Paparella type PE tube in the myringotomy site. After this in a similar fashion with the use of an operating microscope and an otologic speculum, the right external auditory canal was examined. Any cerumen was removed as necessary using instrumentation. The tympanic membrane was visualized, revealing intact tympanic membrane with mild retraction diffuse. Topical phenol was applied to the anterior-inferior quadrant of left-sided tympanic membrane. A myringotomy knife was used to make a radial incision in the tympanic membrane. A 3-suction was used to interrogate the middle ear space which was noted to be well aerated. An alligator forceps was used to place a Paparella type PE tube in the myringotomy. Patient tolerated the procedure without complication. Data/Imaging Review     Comprehensive audiological evaluation performed in the office today by Racquel CELESTE was independently reviewed by myself which demonstrates:    As: Mild sloping to severe mixed hearing loss    Ad: Normal to moderate sensorineural hearing loss    % right, % left. Type C tympanogram right. Type B tympanogram left. Assessment and Plan     1. Chronic serous otitis media of left ear  2. Dysfunction of both eustachian tubes  3. Allergic rhinitis, unspecified seasonality, unspecified trigger    Plan:  -Bilateral tympanostomy tubes placed in the office today under local anesthesia  -Floxin otic GGT x5 days  -Start Flonase daily  - He will follow-up at the beginning of December prior to leaving for the [facility] in January to make sure his tympanostomy tubes are still in place. If he has any issues with his ears prior to that he will call the office and schedule for evaluation sooner. Follow-Up     Return in about 5 months (around 12/12/2022). Karel Silva   Department of Otolaryngology/Head and Neck Surgery  7/12/22    Medical Decision Making: The following items were considered in medical decision making:  Independent review of images  Review / order clinical lab tests  Review / order radiology tests  Decision to obtain old records      This note was generated completely or in part utilizing Dragon dictation speech recognition software.   Occasionally, words are mistranscribed and despite editing, the text may contain inaccuracies due to incorrect word recognition. If further clarification is needed please contact the office at [phone].

## 2022-07-14 DIAGNOSIS — G89.29 CHRONIC LEFT-SIDED LOW BACK PAIN WITH LEFT-SIDED SCIATICA: ICD-10-CM

## 2022-07-14 DIAGNOSIS — M54.42 CHRONIC LEFT-SIDED LOW BACK PAIN WITH LEFT-SIDED SCIATICA: ICD-10-CM

## 2022-07-15 RX ORDER — OXYCODONE AND ACETAMINOPHEN 7.5; 325 MG/1; MG/1
1 TABLET ORAL EVERY 6 HOURS PRN
Qty: 120 TABLET | Refills: 0 | Status: SHIPPED | OUTPATIENT
Start: 2022-07-15 | End: 2022-08-12 | Stop reason: SDUPTHER

## 2022-07-15 NOTE — TELEPHONE ENCOUNTER
Lov 6/21/22  Lrf 120 0 6/14/22Medication:   Requested Prescriptions     Pending Prescriptions Disp Refills    oxyCODONE-acetaminophen (PERCOCET) 7.5-325 MG per tablet 120 tablet 0     Sig: Take 1 tablet by mouth every 6 hours as needed for Pain for up to 30 days. Intended supply: 30 days        Last Filled:      Patient Phone Number: [phone] (home)     Last appt: 6/21/2022   Next appt: Visit date not found    Last OARRS:   RX Monitoring 6/21/2022   Attestation -   Periodic Controlled Substance Monitoring No signs of potential drug abuse or diversion identified.

## 2022-07-18 ENCOUNTER — TELEPHONE (OUTPATIENT)
Dept: ENT CLINIC | Age: 62
End: 2022-07-18

## 2022-08-11 ENCOUNTER — HOSPITAL ENCOUNTER (OUTPATIENT)
Dept: CT IMAGING | Age: 62
Discharge: HOME | End: 2022-08-11
Payer: MEDICARE

## 2022-08-11 DIAGNOSIS — Z72.0 TOBACCO ABUSE: ICD-10-CM

## 2022-08-11 PROCEDURE — 71271 CT THORAX LUNG CANCER SCR C-: CPT

## 2022-08-12 DIAGNOSIS — M54.42 CHRONIC LEFT-SIDED LOW BACK PAIN WITH LEFT-SIDED SCIATICA: ICD-10-CM

## 2022-08-12 DIAGNOSIS — G89.29 CHRONIC LEFT-SIDED LOW BACK PAIN WITH LEFT-SIDED SCIATICA: ICD-10-CM

## 2022-08-15 ENCOUNTER — HOSPITAL ENCOUNTER (EMERGENCY)
Age: 62
Discharge: LEFT BEFORE DISCHARGE | End: 2022-08-15
Payer: MEDICARE

## 2022-08-15 ENCOUNTER — APPOINTMENT (OUTPATIENT)
Dept: GENERAL RADIOLOGY | Age: 62
End: 2022-08-15
Payer: MEDICARE

## 2022-08-15 VITALS
BODY MASS INDEX: 30.78 KG/M2 | HEIGHT: 70 IN | DIASTOLIC BLOOD PRESSURE: 104 MMHG | WEIGHT: 215 LBS | RESPIRATION RATE: 18 BRPM | TEMPERATURE: 98.2 F | SYSTOLIC BLOOD PRESSURE: 133 MMHG | HEART RATE: 94 BPM | OXYGEN SATURATION: 95 %

## 2022-08-15 DIAGNOSIS — Z53.21 PATIENT LEFT WITHOUT BEING SEEN: Primary | ICD-10-CM

## 2022-08-15 PROCEDURE — 93005 ELECTROCARDIOGRAM TRACING: CPT | Performed by: EMERGENCY MEDICINE

## 2022-08-15 RX ORDER — OXYCODONE AND ACETAMINOPHEN 7.5; 325 MG/1; MG/1
1 TABLET ORAL EVERY 6 HOURS PRN
Qty: 120 TABLET | Refills: 0 | Status: SHIPPED | OUTPATIENT
Start: 2022-08-15 | End: 2022-09-12 | Stop reason: SDUPTHER

## 2022-08-15 ASSESSMENT — LIFESTYLE VARIABLES
HOW MANY STANDARD DRINKS CONTAINING ALCOHOL DO YOU HAVE ON A TYPICAL DAY: 1 OR 2
HOW OFTEN DO YOU HAVE A DRINK CONTAINING ALCOHOL: MONTHLY OR LESS

## 2022-08-15 ASSESSMENT — PAIN SCALES - GENERAL: PAINLEVEL_OUTOF10: 8

## 2022-08-15 ASSESSMENT — PAIN DESCRIPTION - LOCATION: LOCATION: CHEST

## 2022-08-15 ASSESSMENT — PAIN - FUNCTIONAL ASSESSMENT: PAIN_FUNCTIONAL_ASSESSMENT: 0-10

## 2022-08-15 ASSESSMENT — PAIN DESCRIPTION - DESCRIPTORS: DESCRIPTORS: SQUEEZING

## 2022-08-15 ASSESSMENT — PAIN DESCRIPTION - ORIENTATION: ORIENTATION: LEFT

## 2022-08-15 NOTE — TELEPHONE ENCOUNTER
Medication:   Requested Prescriptions     Pending Prescriptions Disp Refills    oxyCODONE-acetaminophen (PERCOCET) 7.5-325 MG per tablet 120 tablet 0     Sig: Take 1 tablet by mouth every 6 hours as needed for Pain for up to 30 days. Intended supply: 30 days        Last Filled:  120x 0 RF 7/15/21    Patient Phone Number: [phone] (home)     Last appt: 6/21/2022   Next appt: 9/26/2022    Last OARRS:   RX Monitoring 6/21/2022   Attestation -   Periodic Controlled Substance Monitoring No signs of potential drug abuse or diversion identified.

## 2022-08-16 LAB
EKG ATRIAL RATE: 100 BPM
EKG DIAGNOSIS: NORMAL
EKG P AXIS: 52 DEGREES
EKG P-R INTERVAL: 168 MS
EKG Q-T INTERVAL: 346 MS
EKG QRS DURATION: 92 MS
EKG QTC CALCULATION (BAZETT): 446 MS
EKG R AXIS: 6 DEGREES
EKG T AXIS: 44 DEGREES
EKG VENTRICULAR RATE: 100 BPM

## 2022-08-16 PROCEDURE — 93010 ELECTROCARDIOGRAM REPORT: CPT | Performed by: INTERNAL MEDICINE

## 2022-08-16 NOTE — ED PROVIDER NOTES
Triage EKG:    The 12 lead EKG was interpreted by me as follows:  Rate: normal with a rate of 100  Rhythm: sinus  Axis: normal  Intervals: normal AL, narrow QRS, normal QTc  ST segments: no ST elevations or depressions  T waves: no abnormal inversions  Non-specific T wave changes: not present  Prior EKG comparison: No prior is currently available for comparison        Ranjit Garcia MD  08/15/22 0536

## 2022-08-16 NOTE — ED PROVIDER NOTES
Unfortunately, I did not have the opportunity to see this patient before he left. His EKG was completed prior to leaving without being seen. The patient was triaged by nursing.      WILMER Coles CNP  08/15/22 4444

## 2022-08-17 ENCOUNTER — APPOINTMENT (OUTPATIENT)
Dept: GENERAL RADIOLOGY | Age: 62
End: 2022-08-17
Payer: MEDICARE

## 2022-08-17 ENCOUNTER — HOSPITAL ENCOUNTER (OUTPATIENT)
Age: 62
Setting detail: OBSERVATION
Discharge: HOME | End: 2022-08-18
Attending: INTERNAL MEDICINE | Admitting: INTERNAL MEDICINE
Payer: MEDICARE

## 2022-08-17 PROBLEM — R07.9 CHEST PAIN: Status: ACTIVE | Noted: 2022-08-17

## 2022-08-17 LAB
ANION GAP SERPL CALCULATED.3IONS-SCNC: 7 MMOL/L (ref 3–16)
BASOPHILS ABSOLUTE: 0.1 K/UL (ref 0–0.2)
BASOPHILS RELATIVE PERCENT: 0.9 %
BUN BLDV-MCNC: 10 MG/DL (ref 7–20)
CALCIUM SERPL-MCNC: 9.5 MG/DL (ref 8.3–10.6)
CHLORIDE BLD-SCNC: 103 MMOL/L (ref 99–110)
CO2: 27 MMOL/L (ref 21–32)
CREAT SERPL-MCNC: 0.6 MG/DL (ref 0.9–1.3)
EKG ATRIAL RATE: 73 BPM
EKG DIAGNOSIS: NORMAL
EKG P AXIS: 66 DEGREES
EKG P-R INTERVAL: 174 MS
EKG Q-T INTERVAL: 378 MS
EKG QRS DURATION: 92 MS
EKG QTC CALCULATION (BAZETT): 416 MS
EKG R AXIS: 41 DEGREES
EKG T AXIS: 41 DEGREES
EKG VENTRICULAR RATE: 73 BPM
EOSINOPHILS ABSOLUTE: 0.1 K/UL (ref 0–0.6)
EOSINOPHILS RELATIVE PERCENT: 1 %
GFR AFRICAN AMERICAN: >60
GFR NON-AFRICAN AMERICAN: >60
GLUCOSE BLD-MCNC: 108 MG/DL (ref 70–99)
HCT VFR BLD CALC: 43.9 % (ref 40.5–52.5)
HEMOGLOBIN: 14.8 G/DL (ref 13.5–17.5)
INR BLD: 0.93 (ref 0.87–1.14)
LYMPHOCYTES ABSOLUTE: 1.4 K/UL (ref 1–5.1)
LYMPHOCYTES RELATIVE PERCENT: 14.6 %
MCH RBC QN AUTO: 30.1 PG (ref 26–34)
MCHC RBC AUTO-ENTMCNC: 33.6 G/DL (ref 31–36)
MCV RBC AUTO: 89.4 FL (ref 80–100)
MONOCYTES ABSOLUTE: 0.6 K/UL (ref 0–1.3)
MONOCYTES RELATIVE PERCENT: 5.6 %
NEUTROPHILS ABSOLUTE: 7.7 K/UL (ref 1.7–7.7)
NEUTROPHILS RELATIVE PERCENT: 77.9 %
PDW BLD-RTO: 14.4 % (ref 12.4–15.4)
PLATELET # BLD: 288 K/UL (ref 135–450)
PMV BLD AUTO: 8.6 FL (ref 5–10.5)
POTASSIUM SERPL-SCNC: 4.3 MMOL/L (ref 3.5–5.1)
PROTHROMBIN TIME: 12.4 SEC (ref 11.7–14.5)
RBC # BLD: 4.92 M/UL (ref 4.2–5.9)
SEDIMENTATION RATE, ERYTHROCYTE: 11 MM/HR (ref 0–20)
SODIUM BLD-SCNC: 137 MMOL/L (ref 136–145)
TROPONIN: <0.01 NG/ML
WBC # BLD: 9.9 K/UL (ref 4–11)

## 2022-08-17 PROCEDURE — 85025 COMPLETE CBC W/AUTO DIFF WBC: CPT

## 2022-08-17 PROCEDURE — 6370000000 HC RX 637 (ALT 250 FOR IP): Performed by: INTERNAL MEDICINE

## 2022-08-17 PROCEDURE — 36415 COLL VENOUS BLD VENIPUNCTURE: CPT

## 2022-08-17 PROCEDURE — G0378 HOSPITAL OBSERVATION PER HR: HCPCS

## 2022-08-17 PROCEDURE — 84484 ASSAY OF TROPONIN QUANT: CPT

## 2022-08-17 PROCEDURE — 71046 X-RAY EXAM CHEST 2 VIEWS: CPT

## 2022-08-17 PROCEDURE — 93005 ELECTROCARDIOGRAM TRACING: CPT | Performed by: INTERNAL MEDICINE

## 2022-08-17 PROCEDURE — 85610 PROTHROMBIN TIME: CPT

## 2022-08-17 PROCEDURE — 93010 ELECTROCARDIOGRAM REPORT: CPT | Performed by: INTERNAL MEDICINE

## 2022-08-17 PROCEDURE — 96372 THER/PROPH/DIAG INJ SC/IM: CPT

## 2022-08-17 PROCEDURE — 99285 EMERGENCY DEPT VISIT HI MDM: CPT

## 2022-08-17 PROCEDURE — 85652 RBC SED RATE AUTOMATED: CPT

## 2022-08-17 PROCEDURE — 2580000003 HC RX 258: Performed by: INTERNAL MEDICINE

## 2022-08-17 PROCEDURE — 6360000002 HC RX W HCPCS: Performed by: INTERNAL MEDICINE

## 2022-08-17 PROCEDURE — 80048 BASIC METABOLIC PNL TOTAL CA: CPT

## 2022-08-17 RX ORDER — ACETAMINOPHEN 325 MG/1
650 TABLET ORAL EVERY 6 HOURS PRN
Status: DISCONTINUED | OUTPATIENT
Start: 2022-08-17 | End: 2022-08-18 | Stop reason: HOSPADM

## 2022-08-17 RX ORDER — ENOXAPARIN SODIUM 100 MG/ML
40 INJECTION SUBCUTANEOUS NIGHTLY
Status: DISCONTINUED | OUTPATIENT
Start: 2022-08-17 | End: 2022-08-18 | Stop reason: HOSPADM

## 2022-08-17 RX ORDER — SODIUM CHLORIDE 0.9 % (FLUSH) 0.9 %
5-40 SYRINGE (ML) INJECTION PRN
Status: DISCONTINUED | OUTPATIENT
Start: 2022-08-17 | End: 2022-08-18 | Stop reason: HOSPADM

## 2022-08-17 RX ORDER — POLYETHYLENE GLYCOL 3350 17 G/17G
17 POWDER, FOR SOLUTION ORAL DAILY PRN
Status: DISCONTINUED | OUTPATIENT
Start: 2022-08-17 | End: 2022-08-18 | Stop reason: HOSPADM

## 2022-08-17 RX ORDER — ACETAMINOPHEN 650 MG/1
650 SUPPOSITORY RECTAL EVERY 6 HOURS PRN
Status: DISCONTINUED | OUTPATIENT
Start: 2022-08-17 | End: 2022-08-18 | Stop reason: HOSPADM

## 2022-08-17 RX ORDER — ONDANSETRON 4 MG/1
4 TABLET, ORALLY DISINTEGRATING ORAL EVERY 8 HOURS PRN
Status: DISCONTINUED | OUTPATIENT
Start: 2022-08-17 | End: 2022-08-18 | Stop reason: HOSPADM

## 2022-08-17 RX ORDER — AMLODIPINE BESYLATE 5 MG/1
5 TABLET ORAL DAILY
Status: DISCONTINUED | OUTPATIENT
Start: 2022-08-18 | End: 2022-08-18 | Stop reason: HOSPADM

## 2022-08-17 RX ORDER — NICOTINE 21 MG/24HR
1 PATCH, TRANSDERMAL 24 HOURS TRANSDERMAL DAILY
Status: DISCONTINUED | OUTPATIENT
Start: 2022-08-17 | End: 2022-08-18 | Stop reason: HOSPADM

## 2022-08-17 RX ORDER — ONDANSETRON 2 MG/ML
4 INJECTION INTRAMUSCULAR; INTRAVENOUS EVERY 6 HOURS PRN
Status: DISCONTINUED | OUTPATIENT
Start: 2022-08-17 | End: 2022-08-18 | Stop reason: HOSPADM

## 2022-08-17 RX ORDER — SODIUM CHLORIDE 0.9 % (FLUSH) 0.9 %
5-40 SYRINGE (ML) INJECTION EVERY 12 HOURS SCHEDULED
Status: DISCONTINUED | OUTPATIENT
Start: 2022-08-17 | End: 2022-08-18 | Stop reason: HOSPADM

## 2022-08-17 RX ORDER — ATORVASTATIN CALCIUM 10 MG/1
10 TABLET, FILM COATED ORAL DAILY
Status: DISCONTINUED | OUTPATIENT
Start: 2022-08-18 | End: 2022-08-18 | Stop reason: HOSPADM

## 2022-08-17 RX ORDER — DIPHENHYDRAMINE HCL 25 MG
25 TABLET ORAL NIGHTLY PRN
Status: DISCONTINUED | OUTPATIENT
Start: 2022-08-17 | End: 2022-08-18 | Stop reason: HOSPADM

## 2022-08-17 RX ORDER — SODIUM CHLORIDE 9 MG/ML
INJECTION, SOLUTION INTRAVENOUS PRN
Status: DISCONTINUED | OUTPATIENT
Start: 2022-08-17 | End: 2022-08-18 | Stop reason: HOSPADM

## 2022-08-17 RX ORDER — FLUTICASONE PROPIONATE 50 MCG
2 SPRAY, SUSPENSION (ML) NASAL DAILY
Status: DISCONTINUED | OUTPATIENT
Start: 2022-08-17 | End: 2022-08-18 | Stop reason: HOSPADM

## 2022-08-17 RX ORDER — OXYCODONE AND ACETAMINOPHEN 7.5; 325 MG/1; MG/1
1 TABLET ORAL EVERY 6 HOURS PRN
Status: DISCONTINUED | OUTPATIENT
Start: 2022-08-17 | End: 2022-08-18 | Stop reason: HOSPADM

## 2022-08-17 RX ADMIN — FLUTICASONE PROPIONATE 2 SPRAY: 50 SPRAY, METERED NASAL at 18:30

## 2022-08-17 RX ADMIN — ENOXAPARIN SODIUM 40 MG: 100 INJECTION SUBCUTANEOUS at 20:20

## 2022-08-17 RX ADMIN — Medication 10 ML: at 20:20

## 2022-08-17 RX ADMIN — OXYCODONE AND ACETAMINOPHEN 1 TABLET: 7.5; 325 TABLET ORAL at 19:07

## 2022-08-17 ASSESSMENT — PAIN SCALES - GENERAL
PAINLEVEL_OUTOF10: 0
PAINLEVEL_OUTOF10: 3
PAINLEVEL_OUTOF10: 8

## 2022-08-17 ASSESSMENT — PAIN - FUNCTIONAL ASSESSMENT: PAIN_FUNCTIONAL_ASSESSMENT: 0-10

## 2022-08-17 ASSESSMENT — LIFESTYLE VARIABLES
HOW OFTEN DO YOU HAVE A DRINK CONTAINING ALCOHOL: MONTHLY OR LESS
HOW MANY STANDARD DRINKS CONTAINING ALCOHOL DO YOU HAVE ON A TYPICAL DAY: PATIENT DOES NOT DRINK

## 2022-08-17 NOTE — CARE COORDINATION
Patient admitted as Observation with an anticipated short hospitalization length of stay. Chart reviewed and it appears that patient has minimal needs for discharge at this time. Discussed with patients nurse and requested that case management be notified if discharge needs are identified. *Case management will continue to follow progress and update discharge plan as needed.     Electronically signed by ROMEO Soriano, CHLOE on 8/17/2022 at 4:38 PM

## 2022-08-17 NOTE — ED PROVIDER NOTES
\"occasionally high \". Denies fevers, chills, nausea, vomiting, belly pain. Is this patient to be included in the SEP-1 Core Measure due to severe sepsis or septic shock? No   Exclusion criteria - the patient is NOT to be included for SEP-1 Core Measure due to:   Infection is not suspected      Medical Hx: Past medical history reviewed, and pertinent for:     Past Medical History:   Diagnosis Date    Alcoholism ([facility] [address] 07/14/1999    Anxiety     Back pain     Hyperlipidemia     Low HDL (under 40)     Prolonged emergence from general anesthesia     \"hard time breathing when waking up'    Spinal arthritis 2005       Patient Active Problem List   Diagnosis    History of alcohol abuse    Tobacco abuse    Displacement of lumbar intervertebral disc without myelopathy    Back pain    Anxiety    Major depressive disorder, recurrent episode, severe (HCC)    Hyperlipidemia    Chronic pansinusitis    Disorder of middle ear or mastoid, bilateral    S/P tympanostomy tube placement    Recurrent major depressive disorder, in partial remission (HCC)    Essential hypertension    Numbness    Moderate episode of recurrent major depressive disorder ([address]    Pulmonary emphysema (HCC)    Snoring    Chronic left-sided low back pain with left-sided sciatica         Surgical Hx:   Past surgical history reviewed, and pertinent for:      Past Surgical History:   Procedure Laterality Date    COLONOSCOPY  10/18/2012    COLONOSCOPY N/A 11/23/2021    Dr Estela Abdi[de-identified] 2 polyps in cecum 10 mm polyp mid sigmoid, mild diverticulosis    SPINAL FUSION  10/2006    SPINE SURGERY  2008    l5-s1 hardware removal    TYMPANOSTOMY TUBE PLACEMENT Bilateral 04/09/2018    LEFT PRESSURE EQUALIZATION TUBE INSERTION AND EXAM UNDER ANESTHESIA OF NASOPHARYNX    VASECTOMY  01/11/2019       Social Hx:       Social History     Socioeconomic History    Marital status:      Spouse name: Not on file    Number of children: Not on file    Years of education: Not on file Highest education level: Not on file   Occupational History    Occupation: NA   Tobacco Use    Smoking status: Every Day     Packs/day: 1.50     Years: 43.00     Pack years: 64.50     Types: Cigarettes     Start date: 1/1/1977    Smokeless tobacco: Never   Vaping Use    Vaping Use: Never used   Substance and Sexual Activity    Alcohol use: Not Currently     Alcohol/week: 2.0 standard drinks     Types: 2 Cans of beer per week     Comment: former heavy use; recovering alcoholic since 6488, approx    Drug use: No    Sexual activity: Not on file   Other Topics Concern    Not on file   Social History Narrative    Not on file     Social Determinants of Health     Financial Resource Strain: Low Risk     Difficulty of Paying Living Expenses: Not hard at all   Food Insecurity: No Food Insecurity    Worried About Running Out of Food in the Last Year: Never true    Ran Out of Food in the Last Year: Never true   Transportation Needs: Not on file   Physical Activity: Inactive    Days of Exercise per Week: 0 days    Minutes of Exercise per Session: 0 min   Stress: Not on file   Social Connections: Not on file   Intimate Partner Violence: Not on file   Housing Stability: Not on file         Medications:  Previous Medications    AMLODIPINE (NORVASC) 5 MG TABLET    TAKE 1 TABLET BY MOUTH EVERY DAY    ATORVASTATIN (LIPITOR) 10 MG TABLET    TAKE 1 TABLET BY MOUTH EVERY DAY    FLUTICASONE (FLONASE) 50 MCG/ACT NASAL SPRAY    2 sprays by Each Nostril route daily    HYDROXYZINE (ATARAX) 50 MG TABLET    Take 50 mg by mouth nightly    OFLOXACIN (OCUFLOX) 0.3 % SOLUTION    Place 4 drops in each ear twice a day for the next 5 days. OXYCODONE-ACETAMINOPHEN (PERCOCET) 7.5-325 MG PER TABLET    Take 1 tablet by mouth every 6 hours as needed for Pain for up to 30 days.  Intended supply: 30 days    SODIUM CHLORIDE (OCEAN) 0.65 % NASAL SPRAY    2 sprays by Nasal route 4 times daily       Allergies:  Nucynta [tapentadol], Vicodin [hydrocodone-acetaminophen], Codeine, and Tylenol [acetaminophen]    ROS:  10pt review of systems was performed and was negative except as noted in HPI     Imaging:  XR CHEST (2 VW)    (Results Pending)       Labs:  Results for orders placed or performed during the hospital encounter of 21/87/85   Basic Metabolic Panel   Result Value Ref Range    Sodium 137 136 - 145 mmol/L    Potassium 4.3 3.5 - 5.1 mmol/L    Chloride 103 99 - 110 mmol/L    CO2 27 21 - 32 mmol/L    Anion Gap 7 3 - 16    Glucose 108 (H) 70 - 99 mg/dL    BUN 10 7 - 20 mg/dL    Creatinine 0.6 (L) 0.9 - 1.3 mg/dL    GFR Non-African American >60 >60    GFR African American >60 >60    Calcium 9.5 8.3 - 10.6 mg/dL   CBC with Auto Differential   Result Value Ref Range    WBC 9.9 4.0 - 11.0 K/uL    RBC 4.92 4.20 - 5.90 M/uL    Hemoglobin 14.8 13.5 - 17.5 g/dL    Hematocrit 43.9 40.5 - 52.5 %    MCV 89.4 80.0 - 100.0 fL    MCH 30.1 26.0 - 34.0 pg    MCHC 33.6 31.0 - 36.0 g/dL    RDW 14.4 12.4 - 15.4 %    Platelets 387 979 - 567 K/uL    MPV 8.6 5.0 - 10.5 fL    Neutrophils % 77.9 %    Lymphocytes % 14.6 %    Monocytes % 5.6 %    Eosinophils % 1.0 %    Basophils % 0.9 %    Neutrophils Absolute 7.7 1.7 - 7.7 K/uL    Lymphocytes Absolute 1.4 1.0 - 5.1 K/uL    Monocytes Absolute 0.6 0.0 - 1.3 K/uL    Eosinophils Absolute 0.1 0.0 - 0.6 K/uL    Basophils Absolute 0.1 0.0 - 0.2 K/uL   Troponin   Result Value Ref Range    Troponin <0.01 <0.01 ng/mL   Protime-INR   Result Value Ref Range    Protime 12.4 11.7 - 14.5 sec    INR 0.93 0.87 - 1.14   EKG 12 Lead   Result Value Ref Range    Ventricular Rate 73 BPM    Atrial Rate 73 BPM    P-R Interval 174 ms    QRS Duration 92 ms    Q-T Interval 378 ms    QTc Calculation (Bazett) 416 ms    P Axis 66 degrees    R Axis 41 degrees    T Axis 41 degrees    Diagnosis Normal sinus rhythmNormal ECG        Screenings:     [place] Coma Scale  Eye Opening: Spontaneous  Best Verbal Response: Oriented  Best Motor Response: Obeys

## 2022-08-17 NOTE — PROGRESS NOTES
Pt admitted to room 3302 from ED. VSS on room air. O x 4. Pt lives home and was experiencing CP. Admission Dx: CP. Pt is independent. Pt oriented to room and updated on POC. Pt understands and has no further questions. Call light and bedside table within reach. Safety socks on and  bed in lowest position with 2/4 siderails up. Telemetry verified.  Charge nurse Renetta Laws received report from [facility]

## 2022-08-17 NOTE — ACP (ADVANCE CARE PLANNING)
Advanced Care Planning Note. Purpose of Encounter: Advanced care planning in light of hospitalization  Parties In Attendance: Patient,    Decisional Capacity: Yes  Subjective: Patient  understand that this conversation is to address long term care goal  Objective: Admitted to hospital with chest pain history of tobacco abuse and COPD  Goals of Care Determination: Patient would pursue CPR and Intubation if required. No tracheostomy or long term ventilation if required.      Code Status: full code  Time spent on Advanced care Plannin minutes  Advanced Care Planning Documents: documented patient's wishes, would like Wife Ronda Gonzalez to make medical decisions if unable to make decisions    Yasmine Barr MD  2022 5:02 PM

## 2022-08-17 NOTE — H&P
HOSPITALISTS HISTORY AND PHYSICAL    8/17/2022 4:59 PM    Patient Information:  Niya Francis is a 63 y.o. 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 REVIEW OF SYSTEMS:   Constitutional: Negative for fever,chills or night sweats  ENT: Negative for rhinorrhea, epistaxis, hoarseness, sore throat. Respiratory: see above  Cardiovascular: see above  Gastrointestinal: Negative for nausea, vomiting, diarrhea  Genitourinary: Negative for polyuria, dysuria   Hematologic/Lymphatic: Negative for bleeding tendency, easy bruising  Musculoskeletal: Negative for myalgias and arthralgias  Neurologic: Negative for confusion,dysarthria. Skin: Negative for itching,rash  Psychiatric: Negative for depression,anxiety, agitation. Endocrine: Negative for polydipsia,polyuria,heat /cold intolerance. Past Medical History:   has a past medical history of Alcoholism ([facility] [address] Anxiety, Back pain, Hyperlipidemia, Low HDL (under 40), Prolonged emergence from general anesthesia, and Spinal arthritis. Past Surgical History:   has a past surgical history that includes Spinal fusion (10/2006); Colonoscopy (10/18/2012); Spine surgery (2008);  Tympanostomy tube placement (Bilateral, 04/09/2018); Vasectomy (01/11/2019); and Colonoscopy (N/A, 11/23/2021). Medications:  No current facility-administered medications on file prior to encounter. Current Outpatient Medications on File Prior to Encounter   Medication Sig Dispense Refill    oxyCODONE-acetaminophen (PERCOCET) 7.5-325 MG per tablet Take 1 tablet by mouth every 6 hours as needed for Pain for up to 30 days. Intended supply: 30 days 120 tablet 0    ofloxacin (OCUFLOX) 0.3 % solution Place 4 drops in each ear twice a day for the next 5 days. 1 each 0    fluticasone (FLONASE) 50 MCG/ACT nasal spray 2 sprays by Each Nostril route daily 48 g 1    sodium chloride (OCEAN) 0.65 % nasal spray 2 sprays by Nasal route 4 times daily 1 each 3    hydrOXYzine (ATARAX) 50 MG tablet Take 50 mg by mouth nightly      amLODIPine (NORVASC) 5 MG tablet TAKE 1 TABLET BY MOUTH EVERY DAY 90 tablet 2    atorvastatin (LIPITOR) 10 MG tablet TAKE 1 TABLET BY MOUTH EVERY DAY 90 tablet 2       Allergies: Allergies   Allergen Reactions    Nucynta [Tapentadol] Anaphylaxis     Hallucinations. Went to hospital.    Vicodin [Hydrocodone-Acetaminophen]      Itching      Codeine Rash     itching    Tylenol [Acetaminophen] Rash     Itching, pt states he is able to take        Social History:  Patient Lives at home   reports that he has been smoking cigarettes. He started smoking about 45 years ago. He has a 64.50 pack-year smoking history. He has never used smokeless tobacco. He reports that he does not currently use alcohol after a past usage of about 2.0 standard drinks per week. He reports that he does not use drugs.      Family History:  family history includes Cancer in his mother; Heart Disease in his mother; High Blood Pressure in his mother; Lung Cancer (age of onset: 78) in his mother. ,     Physical Exam:  BP (!) 147/79   Pulse 72   Temp 98.2 °F (36.8 °C) (Oral)   Resp 18   Ht 5' 10\" (1.778 m)   Wt 215 lb (97.5 kg)   SpO2 98%   BMI 30.85 kg/m²     General appearance:  Appears comfortable. AAOx3  HEENT: atraumatic, Pupils equal, muscous membranes moist, no masses appreciated  Cardiovascular: Regular rate and rhythm no murmurs appreciated  Respiratory: CTAB no wheezing  Gastrointestinal: Abdomen soft, non-tender, BS+  EXT: no edema  Neurology: no gross focal deficts  Psychiatry: Appropriate affect. Not agitated  Skin: Warm, dry, no rashes appreciated    Labs:  CBC:   Lab Results   Component Value Date/Time    WBC 9.9 08/17/2022 10:15 AM    RBC 4.92 08/17/2022 10:15 AM    HGB 14.8 08/17/2022 10:15 AM    HCT 43.9 08/17/2022 10:15 AM    MCV 89.4 08/17/2022 10:15 AM    MCH 30.1 08/17/2022 10:15 AM    MCHC 33.6 08/17/2022 10:15 AM    RDW 14.4 08/17/2022 10:15 AM     08/17/2022 10:15 AM    MPV 8.6 08/17/2022 10:15 AM     BMP:    Lab Results   Component Value Date/Time     08/17/2022 10:15 AM    K 4.3 08/17/2022 10:15 AM     08/17/2022 10:15 AM    CO2 27 08/17/2022 10:15 AM    BUN 10 08/17/2022 10:15 AM    CREATININE 0.6 08/17/2022 10:15 AM    CALCIUM 9.5 08/17/2022 10:15 AM    GFRAA >60 08/17/2022 10:15 AM    LABGLOM >60 08/17/2022 10:15 AM    GLUCOSE 108 08/17/2022 10:15 AM     XR CHEST (2 VW)   Final Result   No acute cardiopulmonary findings         NM Cardiac Stress Test Nuclear Imaging    (Results Pending)       Recent imaging reviewed    Problem List  Principal Problem:    Chest pain  Resolved Problems:    * No resolved hospital problems. *        Assessment/Plan:   Chest pain  - troponins  - if negative stress test in am    Chronic copd home meds    Tobacco abuse: advised to quit, nicotine patch      DVT prophylaxis lovenox  Code status full code        Please note that some part of this chart was generated using Dragon dictation software. Although every effort was made to ensure the accuracy of this automated transcription, some errors in transcription may have occurred inadvertently.  If you may need any clarification, please do not hesitate to contact me through [facility].        Jacinto Medina MD    8/17/2022 4:59 PM

## 2022-08-18 ENCOUNTER — APPOINTMENT (OUTPATIENT)
Dept: MRI IMAGING | Age: 62
End: 2022-08-18
Payer: MEDICARE

## 2022-08-18 VITALS
WEIGHT: 208.56 LBS | OXYGEN SATURATION: 96 % | BODY MASS INDEX: 29.86 KG/M2 | TEMPERATURE: 98.1 F | HEIGHT: 70 IN | RESPIRATION RATE: 18 BRPM | HEART RATE: 62 BPM | DIASTOLIC BLOOD PRESSURE: 72 MMHG | SYSTOLIC BLOOD PRESSURE: 132 MMHG

## 2022-08-18 LAB
ANION GAP SERPL CALCULATED.3IONS-SCNC: 7 MMOL/L (ref 3–16)
BASOPHILS ABSOLUTE: 0.1 K/UL (ref 0–0.2)
BASOPHILS RELATIVE PERCENT: 1 %
BUN BLDV-MCNC: 17 MG/DL (ref 7–20)
CALCIUM SERPL-MCNC: 9.1 MG/DL (ref 8.3–10.6)
CHLORIDE BLD-SCNC: 105 MMOL/L (ref 99–110)
CO2: 26 MMOL/L (ref 21–32)
CREAT SERPL-MCNC: 0.6 MG/DL (ref 0.9–1.3)
EOSINOPHILS ABSOLUTE: 0.2 K/UL (ref 0–0.6)
EOSINOPHILS RELATIVE PERCENT: 1.6 %
GFR AFRICAN AMERICAN: >60
GFR NON-AFRICAN AMERICAN: >60
GLUCOSE BLD-MCNC: 97 MG/DL (ref 70–99)
HCT VFR BLD CALC: 40.7 % (ref 40.5–52.5)
HEMOGLOBIN: 13.5 G/DL (ref 13.5–17.5)
LV EF: 56 %
LVEF MODALITY: NORMAL
LYMPHOCYTES ABSOLUTE: 3.3 K/UL (ref 1–5.1)
LYMPHOCYTES RELATIVE PERCENT: 35.1 %
MCH RBC QN AUTO: 30 PG (ref 26–34)
MCHC RBC AUTO-ENTMCNC: 33.1 G/DL (ref 31–36)
MCV RBC AUTO: 90.7 FL (ref 80–100)
MONOCYTES ABSOLUTE: 0.9 K/UL (ref 0–1.3)
MONOCYTES RELATIVE PERCENT: 9.6 %
NEUTROPHILS ABSOLUTE: 5 K/UL (ref 1.7–7.7)
NEUTROPHILS RELATIVE PERCENT: 52.7 %
PDW BLD-RTO: 14.4 % (ref 12.4–15.4)
PLATELET # BLD: 242 K/UL (ref 135–450)
PMV BLD AUTO: 8.6 FL (ref 5–10.5)
POTASSIUM REFLEX MAGNESIUM: 4 MMOL/L (ref 3.5–5.1)
RBC # BLD: 4.49 M/UL (ref 4.2–5.9)
SODIUM BLD-SCNC: 138 MMOL/L (ref 136–145)
WBC # BLD: 9.5 K/UL (ref 4–11)

## 2022-08-18 PROCEDURE — 93017 CV STRESS TEST TRACING ONLY: CPT | Performed by: INTERNAL MEDICINE

## 2022-08-18 PROCEDURE — G0378 HOSPITAL OBSERVATION PER HR: HCPCS

## 2022-08-18 PROCEDURE — 6370000000 HC RX 637 (ALT 250 FOR IP): Performed by: INTERNAL MEDICINE

## 2022-08-18 PROCEDURE — 80048 BASIC METABOLIC PNL TOTAL CA: CPT

## 2022-08-18 PROCEDURE — A9502 TC99M TETROFOSMIN: HCPCS | Performed by: INTERNAL MEDICINE

## 2022-08-18 PROCEDURE — 36415 COLL VENOUS BLD VENIPUNCTURE: CPT

## 2022-08-18 PROCEDURE — 72141 MRI NECK SPINE W/O DYE: CPT

## 2022-08-18 PROCEDURE — 78452 HT MUSCLE IMAGE SPECT MULT: CPT

## 2022-08-18 PROCEDURE — 6370000000 HC RX 637 (ALT 250 FOR IP): Performed by: PHYSICIAN ASSISTANT

## 2022-08-18 PROCEDURE — 85025 COMPLETE CBC W/AUTO DIFF WBC: CPT

## 2022-08-18 PROCEDURE — 2580000003 HC RX 258: Performed by: INTERNAL MEDICINE

## 2022-08-18 PROCEDURE — 3430000000 HC RX DIAGNOSTIC RADIOPHARMACEUTICAL: Performed by: INTERNAL MEDICINE

## 2022-08-18 RX ORDER — NICOTINE 21 MG/24HR
1 PATCH, TRANSDERMAL 24 HOURS TRANSDERMAL DAILY
Qty: 30 PATCH | Refills: 3 | Status: SHIPPED | OUTPATIENT
Start: 2022-08-19 | End: 2022-09-20

## 2022-08-18 RX ADMIN — DIPHENHYDRAMINE HCL 25 MG: 25 TABLET ORAL at 00:40

## 2022-08-18 RX ADMIN — Medication 10 ML: at 07:18

## 2022-08-18 RX ADMIN — TETROFOSMIN 30 MILLICURIE: 1.38 INJECTION, POWDER, LYOPHILIZED, FOR SOLUTION INTRAVENOUS at 09:05

## 2022-08-18 RX ADMIN — OXYCODONE AND ACETAMINOPHEN 1 TABLET: 7.5; 325 TABLET ORAL at 00:39

## 2022-08-18 RX ADMIN — TETROFOSMIN 10 MILLICURIE: 1.38 INJECTION, POWDER, LYOPHILIZED, FOR SOLUTION INTRAVENOUS at 07:54

## 2022-08-18 RX ADMIN — AMLODIPINE BESYLATE 5 MG: 5 TABLET ORAL at 10:41

## 2022-08-18 RX ADMIN — OXYCODONE AND ACETAMINOPHEN 1 TABLET: 7.5; 325 TABLET ORAL at 06:50

## 2022-08-18 RX ADMIN — ATORVASTATIN CALCIUM 10 MG: 10 TABLET, FILM COATED ORAL at 10:41

## 2022-08-18 ASSESSMENT — PAIN DESCRIPTION - PAIN TYPE
TYPE: CHRONIC PAIN
TYPE: CHRONIC PAIN

## 2022-08-18 ASSESSMENT — PAIN SCALES - GENERAL
PAINLEVEL_OUTOF10: 5
PAINLEVEL_OUTOF10: 6
PAINLEVEL_OUTOF10: 5
PAINLEVEL_OUTOF10: 6

## 2022-08-18 ASSESSMENT — PAIN DESCRIPTION - LOCATION
LOCATION: BACK

## 2022-08-18 ASSESSMENT — PAIN DESCRIPTION - DESCRIPTORS
DESCRIPTORS: ACHING
DESCRIPTORS: ACHING

## 2022-08-18 NOTE — DISCHARGE SUMMARY
Hospital Medicine Discharge Summary    Patient: Sea Ignacio     Gender: male  : 1960   Age: 63 y.o. MRN: [id number]    Admitting Physician: Mario Nuñez MD  Discharge Physician: Mario Nuñez MD     Code Status: Full Code     Admit Date: 2022   Discharge Date:   2022    Disposition:  Home    Discharge Diagnoses: Active Hospital Problems    Diagnosis Date Noted    Chest pain [R07.9] 2022     Priority: Medium         Condition at Discharge:  Stable    Hospital Course:   Presented to hospital with chest pain troponins were negative had stress test which was negative was complaining of mild bilateral arm numbness had ESR which is negative  MRI of cervical spine with that showed mild stenosis patient to follow-up with PCP for further work-up    Discharge Exam:    /72   Pulse 62   Temp 98.1 °F (36.7 °C) (Oral)   Resp 18   Ht 5' 10\" (1.778 m)   Wt 208 lb 8.9 oz (94.6 kg)   SpO2 96%   BMI 29.92 kg/m²   General appearance:  Appears comfortable. AAOx3  HEENT: atraumatic, Pupils equal, muscous membranes moist, no masses appreciated  Cardiovascular: Regular rate and rhythm no murmurs appreciated  Respiratory: CTAB no wheezing  Gastrointestinal: Abdomen soft, non-tender, BS+  EXT: no edema  Neurology: no gross focal deficts  Psychiatry: Appropriate affect. Not agitated  Skin: Warm, dry, no rashes appreciated    Discharge Medications:   Current Discharge Medication List        START taking these medications    Details   nicotine (NICODERM CQ) 14 MG/24HR Place 1 patch onto the skin daily  Qty: 30 patch, Refills: 3           Current Discharge Medication List        Current Discharge Medication List        CONTINUE these medications which have NOT CHANGED    Details   oxyCODONE-acetaminophen (PERCOCET) 7.5-325 MG per tablet Take 1 tablet by mouth every 6 hours as needed for Pain for up to 30 days.  Intended supply: 30 days  Qty: 120 tablet, Refills: 0    Comments: Reduce doses taken as pain becomes manageable  Associated Diagnoses: Chronic left-sided low back pain with left-sided sciatica      ofloxacin (OCUFLOX) 0.3 % solution Place 4 drops in each ear twice a day for the next 5 days. Qty: 1 each, Refills: 0      fluticasone (FLONASE) 50 MCG/ACT nasal spray 2 sprays by Each Nostril route daily  Qty: 48 g, Refills: 1      sodium chloride (OCEAN) 0.65 % nasal spray 2 sprays by Nasal route 4 times daily  Qty: 1 each, Refills: 3      hydrOXYzine (ATARAX) 50 MG tablet Take 50 mg by mouth nightly      amLODIPine (NORVASC) 5 MG tablet TAKE 1 TABLET BY MOUTH EVERY DAY  Qty: 90 tablet, Refills: 2    Associated Diagnoses: Essential hypertension      atorvastatin (LIPITOR) 10 MG tablet TAKE 1 TABLET BY MOUTH EVERY DAY  Qty: 90 tablet, Refills: 2    Associated Diagnoses: Hyperlipidemia, unspecified hyperlipidemia type           Current Discharge Medication List          Labs:  For convenience and continuity at follow-up the following most recent labs are provided:    Lab Results   Component Value Date/Time    WBC 9.5 08/18/2022 04:42 AM    HGB 13.5 08/18/2022 04:42 AM    HCT 40.7 08/18/2022 04:42 AM    MCV 90.7 08/18/2022 04:42 AM     08/18/2022 04:42 AM     08/18/2022 04:42 AM    K 4.0 08/18/2022 04:42 AM     08/18/2022 04:42 AM    CO2 26 08/18/2022 04:42 AM    BUN 17 08/18/2022 04:42 AM    CREATININE 0.6 08/18/2022 04:42 AM    CALCIUM 9.1 08/18/2022 04:42 AM    ALKPHOS 78 09/20/2021 11:19 AM    ALT 34 09/20/2021 11:19 AM    AST 28 09/20/2021 11:19 AM    BILITOT 0.6 09/20/2021 11:19 AM    BILIDIR 0.1 05/08/2015 07:44 AM    LABALBU 4.5 09/20/2021 11:19 AM    LDLCALC 135 09/20/2021 11:19 AM    TRIG 174 09/20/2021 11:19 AM     Lab Results   Component Value Date    INR 0.93 08/17/2022       Radiology:  XR CHEST (2 VW)    Result Date: 8/17/2022  EXAMINATION: TWO XRAY VIEWS OF THE CHEST 8/17/2022 10:22 am COMPARISON: Chest x-ray dated 28 February 2019 HISTORY: [id number] PROVIDED HISTORY: chest pain TECHNOLOGIST PROVIDED HISTORY: Reason for exam:->chest pain Reason for Exam: chest pain FINDINGS: Normal cardiomediastinal silhouette. No pneumothorax or pleural effusion. No acute airspace infiltrate. No acute cardiopulmonary findings     MRI CERVICAL SPINE WO CONTRAST    Result Date: 8/18/2022  EXAMINATION: MRI OF THE CERVICAL SPINE WITHOUT CONTRAST 8/18/2022 11:33 am TECHNIQUE: Multiplanar multisequence MRI of the cervical spine was performed without the administration of intravenous contrast. COMPARISON: None. HISTORY: ORDERING SYSTEM PROVIDED HISTORY: bilateral arm parathesia TECHNOLOGIST PROVIDED HISTORY: Reason for exam:->bilateral arm parathesia Reason for Exam: neck and left shoulder pain Initial evaluation. FINDINGS: BONES/ALIGNMENT: The vertebral body heights appear maintained. There straightening of the normal cervical lordosis. There is no significant spondylolisthesis. The bone marrow signal demonstrates no acute abnormality. SPINAL CORD: No abnormal cord signal is seen. SOFT TISSUES: No paraspinal mass identified. C2-C3: There is a central disc protrusion. No significant spinal canal stenosis. No significant neural foraminal narrowing. C3-C4: There is a central disc protrusion contacting the ventral cord. Minimal spinal canal stenosis. Uncovertebral joint and facet arthrosis contributes to moderate bilateral neural foraminal narrowing. C4-C5: There is a disc bulge contacting the ventral cord. Minimal spinal canal stenosis. Uncovertebral joint and facet arthrosis contributes to mild left neural foraminal narrowing. No significant right neural foraminal narrowing. C5-C6: There is a central disc protrusion contacting the ventral cord. Mild spinal canal stenosis. Uncovertebral joint and facet arthrosis contribute to moderate bilateral neural foraminal narrowing. C6-C7: There is a disc bulge without significant spinal canal stenosis.  Uncovertebral joint and BP:130/70 mmHg   Pre-stress physical exam: gxt  Resting pulse ox 96% RA. .  lungs clear. Stress Protocol:Exercise - Ron  Peak HR:136 bpm                            HR/BP product:[id number]  Peak BP:212/79 mmHg                        Max exercise: 10.1 METS  Predicted HR: 161 bpm  % of predicted HR: 84  Test duration:10 min  Reason for termination:Physiologic Maximum   ECG Findings  Normal response to exercise stress . Symptoms  Pulse ox 94% RA. There was stress induced shortness of breath. Symptoms resolved with rest.  Denies any chest pain or discomfort. Complications  Procedure complication was none. Stress Interpretation  ECG portion of stress test is normal.   Imaging Protocols   - One Day   Rest                          Stress   Isotope:Myoview/Tetrofosmin   Isotope: Myoview/Tetrofosmin  Isotope dose:10.3 mCi         Isotope dose:32.3 mCi  Administration Route:I.V. Administration Route:I.V.  Date:08/18/2022 07:51         Date:08/18/2022 09:00                                 Technique:      Gated  Imaging Results    Stress ejection    Ejection fraction:56 %    EDV :150 ml    ESV :66 ml    Stroke volume :84 ml    LV mass :174 gr  Medical History   Additional Medical History   oxyCODONE-acetaminophen (PERCOCET) 7.5-325 MG per tablet Take 1  tablet by mouth every 6 hours as needed for Pain for up to 30  days. Intended supply: 30 days  ofloxacin (OCUFLOX) 0.3 % solution Place 4 drops in each ear  twice a day for the next 5 days.   fluticasone (FLONASE) 50 MCG/ACT nasal spray 2 sprays by Each  Nostril route daily  sodium chloride (OCEAN) 0.65 % nasal spray 2 sprays by Nasal  route 4 times daily  hydrOXYzine (ATARAX) 50 MG tablet Take 50 mg by mouth nightly  amLODIPine (NORVASC) 5 MG tablet TAKE 1 TABLET BY MOUTH EVERY  DAY  atorvastatin (LIPITOR) 10 MG tablet TAKE 1 TABLET BY MOUTH EVERY  DAY   Signatures   ------------------------------------------------------------------  Electronically signed by Luis Daily Hansel Archibald MD, [facility] (Interpreting  physician) on 08/18/2022 at 12:04  ------------------------------------------------------------------      CT Lung Screen (Initial or Annual)    Result Date: 8/14/2022  EXAMINATION: LOW DOSE SCREENING CT OF THE CHEST WITHOUT CONTRAST 8/11/2022 2:21 pm TECHNIQUE: Low dose lung cancer screening CT of the chest was performed without the administration of intravenous contrast.  Multiplanar reformatted images are provided for review. Automated exposure control, iterative reconstruction, and/or weight based adjustment of the mA/kV was utilized to reduce the radiation dose to as low as reasonably achievable. COMPARISON: 04/15/2021 CT HISTORY: ORDERING SYSTEM PROVIDED HISTORY: Tobacco abuse TECHNOLOGIST PROVIDED HISTORY: Age: 63 y.o. Smoking History: Social History Tobacco Use Smoking status: Current Every Day Smoker Packs/day: 1.00 Years: 43.00 Pack years: 37 Types: Cigarettes Start date: 1/1/1977 Smokeless tobacco: Never Used Alcohol use: Yes Alcohol/week: 1.0 - 2.0 standard drink Types: 1 - 2 Shots of liquor per week Comment: former heavy use; recovering alcoholic since 8695, approx Drug use: No Pack years: 37 4/15/2021 Is there documentation of shared decision making? ->Yes Does the patient show any signs or symptoms of lung cancer? ->No Is this the first (baseline) CT or an annual exam?->Annual Is this a low dose CT or a routine CT?->Low Dose CT Smoking Status? ->Every Day Smoking packs per day?->1 Years smoking?->43 CTDlvol (mGy)->1.47 DLP (mGy*cm)->54.82 Reconstructed image width (mm)->2 Reason for Exam: tobacco abuse FINDINGS: Lung Screening Specific (Lung-RADS): There are 2 punctate sub solid nodules in the right upper lobe that are stable from prior imaging. No new or suspicious nodule is observed bilaterally. Potentially Significant Incidentals (Lung-RADS Category S): None Pulmonary incidentals:  Calcified granulomata in the mediastinum and right hilum.  Other incidentals:  None 1. Lung-RADS Category: LCS-2 2. Lung-RADS Category S: Negative 3. Granulomatous changes in the chest. RECOMMENDATIONS: Per ACR Lung-RADS Version 1.1 Category 2: Benign appearance or behavior Nodules with a very low liklihood of becoming a clinically active cancer due to size or lack of growth.  Management:  Continue annual lung screening with LDCT in 12 months. (probability of malignancy <1%)        Signed:    Michael Marion MD   8/18/2022

## 2022-08-18 NOTE — PROGRESS NOTES
Data- discharge order received, pt verbalized agreement to discharge, disposition to previous residence, no needs for HHC/DME. Action- discharge instructions prepared and given to pt, pt verbalized understanding. Medication information packet given r/t NEW and/or CHANGED prescriptions emphasizing name/purpose/side effects, pt verbalized understanding. Discharge instruction summary: Diet- cardiac, Activity- as henrik, Primary Care Physician as follows: Clay Mari -[phone] f/u appointment , immunizations reviewed, prescription medications filled Megan Jara. 1. WEIGHT: Admit Weight: 215 lb (97.5 kg) (08/17/22 1009)        Today  Weight: 208 lb 8.9 oz (94.6 kg) (08/18/22 0600)       2. O2 SAT.: SpO2: 96 % (08/18/22 1015)    Response- Pt belongings gathered, IV removed. Disposition is home (no HHC/DME needs), transported with self, Ambulated to ED lobby , no complications. What Type Of Note Output Would You Prefer (Optional)?: Standard Output Hpi Title: Evaluation of Skin Lesions How Severe Are Your Spot(S)?: mild Have Your Spot(S) Been Treated In The Past?: has not been treated

## 2022-08-18 NOTE — PLAN OF CARE
Problem: Discharge Planning  Goal: Discharge to home or other facility with appropriate resources  Outcome: Progressing     Problem: Pain  Goal: Verbalizes/displays adequate comfort level or baseline comfort level  Outcome: Progressing     Problem: Safety - Adult  Goal: Free from fall injury  Outcome: Progressing     Problem: ABCDS Injury Assessment  Goal: Absence of physical injury  Outcome: Progressing     Problem: Respiratory - Adult  Goal: Achieves optimal ventilation and oxygenation  Outcome: Progressing     Problem: Cardiovascular - Adult  Goal: Maintains optimal cardiac output and hemodynamic stability  Outcome: Progressing  Goal: Absence of cardiac dysrhythmias or at baseline  Outcome: Progressing     Problem: Musculoskeletal - Adult  Goal: Return mobility to safest level of function  Outcome: Progressing     Problem: Gastrointestinal - Adult  Goal: Minimal or absence of nausea and vomiting  Outcome: Progressing     Problem: Metabolic/Fluid and Electrolytes - Adult  Goal: Electrolytes maintained within normal limits  Outcome: Progressing  Goal: Hemodynamic stability and optimal renal function maintained  Outcome: Progressing  Goal: Glucose maintained within prescribed range  Outcome: Progressing     Problem: Hematologic - Adult  Goal: Maintains hematologic stability  Outcome: Progressing

## 2022-08-19 ENCOUNTER — TELEPHONE (OUTPATIENT)
Dept: FAMILY MEDICINE CLINIC | Age: 62
End: 2022-08-19

## 2022-08-19 NOTE — TELEPHONE ENCOUNTER
----- Message from Bobby sent at 8/19/2022  9:18 AM EDT -----  Subject: Appointment Request    Reason for Call: Established Patient Appointment needed: Routine Hospital   Follow Up    QUESTIONS    Reason for appointment request? Available appointments did not meet   patient need     Additional Information for Provider? Patient is requesting a call back to   schedule to be seen today by PCP only for a hospital follow up [facility] discharged 8/18, left shoulder pain into chest. He states he has   no worsening symptoms today.  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  ---------------------------------------------------------------------------  --------------  [employer] INFO  [phone]; OK to leave message on voicemail  ---------------------------------------------------------------------------  --------------  SCRIPT ANSWERS  COVID Screen: Trung Ocampo

## 2022-08-23 ENCOUNTER — OFFICE VISIT (OUTPATIENT)
Dept: FAMILY MEDICINE CLINIC | Age: 62
End: 2022-08-23
Payer: MEDICARE

## 2022-08-23 VITALS
OXYGEN SATURATION: 96 % | HEART RATE: 75 BPM | DIASTOLIC BLOOD PRESSURE: 80 MMHG | HEIGHT: 70 IN | SYSTOLIC BLOOD PRESSURE: 122 MMHG | WEIGHT: 213 LBS | BODY MASS INDEX: 30.49 KG/M2

## 2022-08-23 DIAGNOSIS — Z09 HOSPITAL DISCHARGE FOLLOW-UP: Primary | ICD-10-CM

## 2022-08-23 DIAGNOSIS — M54.42 CHRONIC LEFT-SIDED LOW BACK PAIN WITH LEFT-SIDED SCIATICA: ICD-10-CM

## 2022-08-23 DIAGNOSIS — R07.9 CHEST PAIN, UNSPECIFIED TYPE: ICD-10-CM

## 2022-08-23 DIAGNOSIS — G89.29 CHRONIC LEFT-SIDED LOW BACK PAIN WITH LEFT-SIDED SCIATICA: ICD-10-CM

## 2022-08-23 DIAGNOSIS — F41.9 ANXIETY: ICD-10-CM

## 2022-08-23 PROCEDURE — 1111F DSCHRG MED/CURRENT MED MERGE: CPT | Performed by: FAMILY MEDICINE

## 2022-08-23 PROCEDURE — 99215 OFFICE O/P EST HI 40 MIN: CPT | Performed by: FAMILY MEDICINE

## 2022-08-23 RX ORDER — ALPRAZOLAM 1 MG/1
TABLET ORAL
Qty: 90 TABLET | Refills: 0 | Status: SHIPPED | OUTPATIENT
Start: 2022-08-23 | End: 2022-09-19 | Stop reason: SDUPTHER

## 2022-08-23 NOTE — PROGRESS NOTES
Post-Discharge Transitional Care  Follow Up      Mallory Coy   YOB: 1960    Date of Office Visit:  8/23/2022  Date of Hospital Admission: 8/17/22  Date of Hospital Discharge: 8/18/22  Risk of hospital readmission (high >=14%. Medium >=10%) :No data recorded    Care management risk score Rising risk (score 2-5) and Complex Care (Scores >=6): No Risk Score On File     Non face to face  following discharge, date last encounter closed (first attempt may have been earlier): *No documented post hospital discharge outreach found in the last 14 days    Call initiated 2 business days of discharge: *No response recorded in the last 14 days    ASSESSMENT/PLAN:   Hospital discharge follow-up  -     WI DISCHARGE MEDS RECONCILED W/ CURRENT OUTPATIENT MED LIST  Chest pain, unspecified type  Patient had a normal cardiac work-up with normal troponins and stress test Myoview. Chronic left-sided low back pain with left-sided sciatica  [place]AR[place] report reviewed and no inconsistencies noted   The patient understands the risks of dependency/addiction with oxycodone and will take as little as possible and discontinue as soon as possible   Anxiety  -     ALPRAZolam (XANAX) 1 MG tablet; 1 tab as needed 3 times daily, Disp-90 tablet, R-0Normal  OARRS report reviewed and no inconsistencies noted   The patient understands the risks of dependency/addiction with alprazolam and will take as little as possible and discontinue as soon as possible     Medical Decision Making: high complexity  Return in about 3 months (around 11/23/2022). Subjective:   HPI:  Follow up of Hospital problems/diagnosis(es): Patient states he developed some mild chest pain in his left upper chest near his shoulder. This seemed to intensify after some stress related to his wife and building a house in the [employer] [facility]. When the pain further intensified he decided to go to the ER.   Work-up consisted of a chemical stress test, chest x-ray, lab work, and MRI of his cervical spine    Inpatient course: Discharge summary reviewed- see chart. Interval history/Current status: Since the patient has been home he has been essentially negative as far as chest pain. He states he has been little more anxious and stressed as well as his back continuing to hurt. Patient Active Problem List   Diagnosis    History of alcohol abuse    Tobacco abuse    Displacement of lumbar intervertebral disc without myelopathy    Back pain    Anxiety    Major depressive disorder, recurrent episode, severe (HCC)    Hyperlipidemia    Chronic pansinusitis    Disorder of middle ear or mastoid, bilateral    S/P tympanostomy tube placement    Recurrent major depressive disorder, in partial remission ([address]    Essential hypertension    Numbness    Moderate episode of recurrent major depressive disorder ([address]    Pulmonary emphysema (HCC)    Snoring    Chronic left-sided low back pain with left-sided sciatica    Chest pain       Medications listed as ordered at the time of discharge from hospital     Medication List            Accurate as of August 23, 2022 12:16 PM. If you have any questions, ask your nurse or doctor. START taking these medications      ALPRAZolam 1 MG tablet  Commonly known as: XANAX  1 tab as needed 3 times daily  Started by: Remi Serrato MD            CONTINUE taking these medications      amLODIPine 5 MG tablet  Commonly known as: NORVASC  TAKE 1 TABLET BY MOUTH EVERY DAY     atorvastatin 10 MG tablet  Commonly known as: LIPITOR  TAKE 1 TABLET BY MOUTH EVERY DAY     fluticasone 50 MCG/ACT nasal spray  Commonly known as: FLONASE  2 sprays by Each Nostril route daily     hydrOXYzine HCl 50 MG tablet  Commonly known as: ATARAX     nicotine 14 MG/24HR  Commonly known as: NICODERM CQ  Place 1 patch onto the skin daily     ofloxacin 0.3 % solution  Commonly known as: Ocuflox  Place 4 drops in each ear twice a day for the next 5 days. oxyCODONE-acetaminophen 7.5-325 MG per tablet  Commonly known as: Percocet  Take 1 tablet by mouth every 6 hours as needed for Pain for up to 30 days. Intended supply: 30 days     sodium chloride 0.65 % nasal spray  Commonly known as: OCEAN  2 sprays by Nasal route 4 times daily               Where to Get Your Medications        These medications were sent to [address], [address] [place] [address] [phone]  [facility] [address] [facility] [address]      Phone: [phone]   ALPRAZolam 1 MG tablet           Medications marked \"taking\" at this time  Outpatient Medications Marked as Taking for the 8/23/22 encounter (Office Visit) with Khurram Lugo MD   Medication Sig Dispense Refill    ALPRAZolam (XANAX) 1 MG tablet 1 tab as needed 3 times daily 90 tablet 0    nicotine (NICODERM CQ) 14 MG/24HR Place 1 patch onto the skin daily 30 patch 3    oxyCODONE-acetaminophen (PERCOCET) 7.5-325 MG per tablet Take 1 tablet by mouth every 6 hours as needed for Pain for up to 30 days. Intended supply: 30 days 120 tablet 0    fluticasone (FLONASE) 50 MCG/ACT nasal spray 2 sprays by Each Nostril route daily 48 g 1    sodium chloride (OCEAN) 0.65 % nasal spray 2 sprays by Nasal route 4 times daily 1 each 3    amLODIPine (NORVASC) 5 MG tablet TAKE 1 TABLET BY MOUTH EVERY DAY 90 tablet 2    atorvastatin (LIPITOR) 10 MG tablet TAKE 1 TABLET BY MOUTH EVERY DAY 90 tablet 2        Medications patient taking as of now reconciled against medications ordered at time of hospital discharge: Yes    Patient states he has had no further chest pain since hospitalization. His biggest issue as usual as his back pain.   He also states he has been more stressed and anxious than usual.    Objective:    /80   Pulse 75   Ht 5' 10\" (1.778 m)   Wt 213 lb (96.6 kg)   SpO2 96%   BMI 30.56 kg/m²   Vitals:    08/23/22 1119   BP: 122/80   Pulse: 75   SpO2: 96%   Weight: 213 lb (96.6 kg)   Height: 5' 10\" (1.778 m)     Body mass index is 30.56 kg/m². General Appearance: alert and oriented, in no acute distress  Skin: warm and dry, no rash or erythema  Head: normocephalic and atraumatic  Eyes:conjunctivae normal  Neck: supple and non-tender without mass, no thyromegaly or thyroid nodules, no cervical lymphadenopathy  Pulmonary/Chest: clear to auscultation bilaterally- no wheezes, rales or rhonchi, normal air movement, no respiratory distress  Cardiovascular: normal rate, regular rhythm, normal S1 and S2, no murmurs, rubs, clicks, or gallops, no carotid bruits  Abdomen: soft, non-tender, non-distended, normal bowel sounds, no masses or organomegaly  Extremities: no cyanosis, clubbing or edema  Neurologic: no cranial nerve deficit, speech normal       An electronic signature was used to authenticate this note.   --Ayse Dale MD

## 2022-09-07 ENCOUNTER — APPOINTMENT (OUTPATIENT)
Dept: CT IMAGING | Age: 62
End: 2022-09-07
Payer: MEDICARE

## 2022-09-07 ENCOUNTER — HOSPITAL ENCOUNTER (EMERGENCY)
Age: 62
Discharge: LEFT BEFORE DISCHARGE | End: 2022-09-07
Attending: EMERGENCY MEDICINE
Payer: MEDICARE

## 2022-09-07 VITALS
HEART RATE: 78 BPM | OXYGEN SATURATION: 94 % | WEIGHT: 210 LBS | DIASTOLIC BLOOD PRESSURE: 79 MMHG | TEMPERATURE: 97.4 F | HEIGHT: 70 IN | BODY MASS INDEX: 30.06 KG/M2 | SYSTOLIC BLOOD PRESSURE: 123 MMHG | RESPIRATION RATE: 16 BRPM

## 2022-09-07 DIAGNOSIS — Z53.29 REFUSAL OF CARE BY PATIENT: ICD-10-CM

## 2022-09-07 DIAGNOSIS — F10.920 ACUTE ALCOHOLIC INTOXICATION WITHOUT COMPLICATION (HCC): ICD-10-CM
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
## 2022-09-07 DIAGNOSIS — Z53.29 LEFT AGAINST MEDICAL ADVICE: ICD-10-CM

## 2022-09-07 DIAGNOSIS — S00.81XA ABRASION OF FOREHEAD, INITIAL ENCOUNTER: Primary | ICD-10-CM

## 2022-09-07 PROCEDURE — 99283 EMERGENCY DEPT VISIT LOW MDM: CPT

## 2022-09-07 RX ORDER — 0.9 % SODIUM CHLORIDE 0.9 %
1000 INTRAVENOUS SOLUTION INTRAVENOUS ONCE
Status: DISCONTINUED | OUTPATIENT
Start: 2022-09-07 | End: 2022-09-07

## 2022-09-07 ASSESSMENT — ENCOUNTER SYMPTOMS
ABDOMINAL PAIN: 0
DIARRHEA: 0
VOMITING: 0
NAUSEA: 0
COUGH: 0
RHINORRHEA: 0
SHORTNESS OF BREATH: 0

## 2022-09-07 NOTE — ED NOTES
AMA form signed by pt. Placed in wheelchair and in waiting room to wait for wife.      Kyra Macias RN  09/07/22 [id number]

## 2022-09-07 NOTE — ED NOTES
Called wife and updated on pt, she will be here in 10 minutes to pick him up from LAZARO Emery RN  09/07/22 Gabrielle Mejia - [facility]

## 2022-09-07 NOTE — ED PROVIDER NOTES
I independently saw performed a substantive portion of the visit (history, physical, and MDM) on Graeme Munoz. All diagnostic, treatment, and disposition decisions were made by myself in conjunction with the advanced practice provider. I have participated in the medical decision making and directed the treatment plan and disposition of the patient. For further details of [facility] emergency department encounter, please see the advanced practice provider's documentation. Catherine Collins MD, am the primary physician provider of record. CHIEF COMPLAINT  Chief Complaint   Patient presents with    Alcohol Intoxication     Pt brought in by squad from home after falling and hitting head. Small abrasion to forehead above right eye. Pt admits to having 12 pack of beer, some weed and percocet \"3 tab every 6 hours\"     Briefly, Graeme Munoz is a 63 y.o. male  who presents to the ED complaining of falling and hitting his head, although he denies any pain there. He does have an abrasion without laceration noted to his forehead. He did drink alcohol today and also some marijuana and prescribed opiates. On arrival, he is refusing all medical care. He does let me examine him however he does not want any imaging done or any other testing regarding his injury. He says that he only came to the ED to \"get my wife off of my back\" as she was the one who called 911. Patient did not have apparent loss of consciousness and has not vomiting. He is intoxicated but he is ambulatory. He is not disoriented or encephalopathic or psychotic. FOCUSED PHYSICAL EXAMINATION  /79   Pulse 78   Temp 97.4 °F (36.3 °C)   Resp 16   Ht 5' 10\" (1.778 m)   Wt 210 lb (95.3 kg)   SpO2 94%   BMI 30.13 kg/m²    Focused physical examination notable for no acute distress, well-appearing, well-nourished, slurred speech but normal mentation without obvious facial droop, no obvious rash.   No obvious cranial nerve deficits on my initial exam.  Independently ambulatory. Forehead contusion with abrasion noted, no laceration. No cervical thoracic or lumbar spine tenderness or tenderness in extremities chest or abdomen on my evaluation. Regular rate and rhythm clear to auscultation bilaterally. Independently ambulatory. Not suicidal.    MDM:  Diagnostic considerations included intracranial injury, cervical spine injury, chest/abdominal organ injury, extremity injury, abrasion/laceration, contusion, fracture, sprain/strain, dislocation    ED course was notable for refusal of all care when in the ED. Although he is intoxicated, he is alert, oriented x4, is able to explicitly express to me that he understands he could miss a brain bleed and potentially devastating life-threatening consequences including death by missing his diagnosis, and refuses all care anyway including imaging, labs, or topical wound care. He does not have any injuries requiring primary repair with sutures. He is not psychotic or disoriented or unable to comprehend in my opinion his decisions. He is fully informed that we strongly recommend unequivocally to obtain CT imaging of the head and cervical spine at minimum and likely more diagnostics based on a secondary survey however he is refusing any of this. His vitals are reassuring and he is independently ambulatory and as such is discharged [address] as I do not feel we can force him to obtain diagnostics against his will at this point. CLINICAL IMPRESSION  1. Abrasion of forehead, initial encounter    2. Fall on same level from slipping, tripping or stumbling, initial encounter    3. Acute alcoholic intoxication without complication ([address]    4. Refusal of care by patient    5. Left against medical advice        DISPOSITION  Micky Hill was discharged AMA in fair condition.       Follow-up with:  [facility] Emergency Department  [place] [address]  [phone]  Go to   For symptom re-evaluation, If symptoms worsen      This chart was created using Dragon dictation software. Efforts were made by me to ensure accuracy, however some errors may be present due to limitations of this technology.             Nicol Prabhakar MD  09/07/22 [id number]

## 2022-09-07 NOTE — ED PROVIDER NOTES
[address]        Pt Name: Soila Espinal  MRN: [id number]  [place] 1960  Date of evaluation: 9/7/2022  Provider: Jenna Agosto PA-C  PCP: Roman Diamond MD  Note Started: 7:40 PM EDT        I have seen and evaluated this patient with my supervising physician No att. providers found. CHIEF COMPLAINT       Chief Complaint   Patient presents with    Alcohol Intoxication     Pt brought in by squad from home after falling and hitting head. Small abrasion to forehead above right eye. Pt admits to having 12 pack of beer, some weed and percocet \"3 tab every 6 hours\"       HISTORY OF PRESENT ILLNESS   (Location, Timing/Onset, Context/Setting, Quality, Duration, Modifying Factors, Severity, Associated Signs and Symptoms)  Note limiting factors. Chief Complaint: Alcohol intoxication    Soila Espinal is a 63 y.o. male who presents department today for evaluation for alcohol intoxication. The patient was brought in by EMS from home. The patient admits to drinking a 12 pack of beer today, states he did smoke some marijuana, and also took his home dose of pain medications and states that he was on his front porch, was stumbling, tripped, fell and hit his head. No loss of consciousness. No vomiting. The wife is concerned, and called EMS. The patient arrives to the ED fluids, although is clinically stable. He is awake, alert, talkative. He does have a small abrasion to his forehead. The patient states that he does not want to be here. Patient denies any suicidal ideation or homicidal ideation. He is no headaches. He has no visual changes. Patient states that he has been under a lot of stress and states that this is why he was drinking. Patient has no chest pain or shortness of breath, no other complaints or symptoms  Nursing Notes were all reviewed and agreed with or any disagreements were addressed in the HPI.     REVIEW OF SYSTEMS    (2-9 systems for level 4, 10 or more for level 5)     Review of Systems   Constitutional:  Negative for activity change, appetite change, chills and fever. HENT:  Negative for congestion and rhinorrhea. Respiratory:  Negative for cough and shortness of breath. Cardiovascular:  Negative for chest pain. Gastrointestinal:  Negative for abdominal pain, diarrhea, nausea and vomiting. Genitourinary:  Negative for difficulty urinating, dysuria and hematuria. Skin:  Positive for wound. Neurological:  Negative for weakness and numbness. Positives and Pertinent negatives as per HPI. Except as noted above in the ROS, all other systems were reviewed and negative. PAST MEDICAL HISTORY     Past Medical History:   Diagnosis Date    Alcoholism ([address] 07/14/1999    Anxiety     Back pain     Hyperlipidemia     Low HDL (under 40)     Prolonged emergence from general anesthesia     \"hard time breathing when waking up'    Spinal arthritis 2005         SURGICAL HISTORY     Past Surgical History:   Procedure Laterality Date    COLONOSCOPY  10/18/2012    COLONOSCOPY N/A 11/23/2021    Dr Swati Morse[de-identified] 2 polyps in cecum 10 mm polyp mid sigmoid, mild diverticulosis    SPINAL FUSION  10/2006    SPINE SURGERY  2008    l5-s1 hardware removal    TYMPANOSTOMY TUBE PLACEMENT Bilateral 04/09/2018    LEFT PRESSURE EQUALIZATION TUBE INSERTION AND EXAM UNDER ANESTHESIA OF NASOPHARYNX    VASECTOMY  01/11/2019         [address]       Discharge Medication List as of 9/7/2022  7:02 PM        CONTINUE these medications which have NOT CHANGED    Details   ALPRAZolam (XANAX) 1 MG tablet 1 tab as needed 3 times daily, Disp-90 tablet, R-0Normal      nicotine (NICODERM CQ) 14 MG/24HR Place 1 patch onto the skin daily, Disp-30 patch, R-3Normal      oxyCODONE-acetaminophen (PERCOCET) 7.5-325 MG per tablet Take 1 tablet by mouth every 6 hours as needed for Pain for up to 30 days.  Intended supply: 30 days, Disp-120 tablet, R-0Normal      ofloxacin (OCUFLOX) 0.3 % solution Place 4 drops in each ear twice a day for the next 5 days. , Disp-1 each, R-0Normal      fluticasone (FLONASE) 50 MCG/ACT nasal spray 2 sprays by Each Nostril route daily, Disp-48 g, R-1Normal      sodium chloride (OCEAN) 0.65 % nasal spray 2 sprays by Nasal route 4 times daily, Disp-1 each, R-3Normal      hydrOXYzine (ATARAX) 50 MG tablet Take 50 mg by mouth nightlyHistorical Med      amLODIPine (NORVASC) 5 MG tablet TAKE 1 TABLET BY MOUTH EVERY DAY, Disp-90 tablet, R-2Normal      atorvastatin (LIPITOR) 10 MG tablet TAKE 1 TABLET BY MOUTH EVERY DAY, Disp-90 tablet, R-2Normal               ALLERGIES     Nucynta [tapentadol], Vicodin [hydrocodone-acetaminophen], Codeine, and Tylenol [acetaminophen]    FAMILYHISTORY       Family History   Problem Relation Age of Onset    Heart Disease Mother     High Blood Pressure Mother     Cancer Mother         female    Lung Cancer Mother 78        stage 4, age 78 after 4-5 months, 3/2021          SOCIAL HISTORY       Social History     Tobacco Use    Smoking status: Every Day     Packs/day: 1.50     Years: 43.00     Pack years: 64.50     Types: Cigarettes     Start date: 1977    Smokeless tobacco: Never   Vaping Use    Vaping Use: Never used   Substance Use Topics    Alcohol use: Not Currently     Alcohol/week: 2.0 standard drinks     Types: 2 Cans of beer per week     Comment: former heavy use; recovering alcoholic since , approx    Drug use: No       SCREENINGS             PHYSICAL EXAM    (up to 7 for level 4, 8 or more for level 5)     ED Triage Vitals [22 1813]   BP Temp Temp src Heart Rate Resp SpO2 Height Weight   123/79 97.4 °F (36.3 °C) -- 78 16 94 % 5' 10\" (1.778 m) 210 lb (95.3 kg)       Physical Exam  Vitals and nursing note reviewed. Constitutional:       Appearance: He is well-developed. He is not diaphoretic. HENT:      Head: Normocephalic and atraumatic.       Comments: Small abrasion noted to the right upper forehead. No laceration. No hematoma. No rascon sign raccoon sign. No CSF rhinorrhea. Right Ear: External ear normal.      Left Ear: External ear normal.      Nose: Nose normal.      Mouth/Throat:      Mouth: Mucous membranes are moist.      Pharynx: Oropharynx is clear. No posterior oropharyngeal erythema. Eyes:      General:         Right eye: No discharge. Left eye: No discharge. Extraocular Movements: Extraocular movements intact. Conjunctiva/sclera: Conjunctivae normal.      Pupils: Pupils are equal, round, and reactive to light. Neck:      Trachea: No tracheal deviation. Cardiovascular:      Rate and Rhythm: Normal rate and regular rhythm. Heart sounds: No murmur heard. Pulmonary:      Effort: Pulmonary effort is normal. No respiratory distress. Breath sounds: Normal breath sounds. No wheezing or rales. Musculoskeletal:         General: Normal range of motion. Cervical back: Normal range of motion and neck supple. Skin:     General: Skin is warm and dry. Neurological:      General: No focal deficit present. Mental Status: He is alert and oriented to person, place, and time. Psychiatric:         Behavior: Behavior normal.       DIAGNOSTIC RESULTS   LABS:    Labs Reviewed - No data to display    When ordered only abnormal lab results are displayed. All other labs were within normal range or not returned as of this dictation. EKG: When ordered, EKG's are interpreted by the Emergency Department Physician in the absence of a cardiologist.  Please see their note for interpretation of EKG. RADIOLOGY:   Non-plain film images such as CT, Ultrasound and MRI are read by the radiologist. Plain radiographic images are visualized and preliminarily interpreted by the ED Provider with the below findings:        Interpretation per the Radiologist below, if available at the time of this note:    No orders to display     No results found. PROCEDURES   Unless otherwise noted below, none     Procedures    CRITICAL CARE TIME       CONSULTS:  None      EMERGENCY DEPARTMENT COURSE and DIFFERENTIAL DIAGNOSIS/MDM:   Vitals:    Vitals:    09/07/22 1813   BP: 123/79   Pulse: 78   Resp: 16   Temp: 97.4 °F (36.3 °C)   SpO2: 94%   Weight: 210 lb (95.3 kg)   Height: 5' 10\" (1.778 m)       Patient was given the following medications:  Medications - No data to display      Is this patient to be included in the SEP-1 Core Measure due to severe sepsis or septic shock? No   Exclusion criteria - the patient is NOT to be included for SEP-1 Core Measure due to: Infection is not suspected    Briefly, this is a 61-year-old male who presents to the emergency department today for evaluation for alcohol intoxication. The patient admits to drinking a 12 pack today, also smoking marijuana, and taking his prescribed pain medications. Patient had a mechanical trip and fall on the porch today. He did not hit his head no loss of consciousness. No vomiting. Patient is in the emergency room after wife called EMS. When patient arrived he states that he does not want to be here. He is alert and oriented x3. He does have a small abrasion. There are no neurological deficits. Patient is clinically sober, he is awake he is alert and oriented x3, he does understand the risks of leaving AMA, which include permanent disability and even death. He is able and capable of making his own medical decisions. He is refusing any lab work, or imaging here. He tells us that he came to the emergency room to \"get my wife off my back\". He states that he will call his wife to pick him up, or perhaps take an [employer] home. He is ambulatory, tolerating p.o., and within his patient's rights to refuse any testing. Patient will leave [address] Recommend that he follow-up with his primary care physician within 1 to 2 days.   He did discuss with the patient that he is welcome to return at any time or if he changes his mind. FINAL IMPRESSION      1. Abrasion of forehead, initial encounter    2. Fall on same level from slipping, tripping or stumbling, initial encounter    3. Acute alcoholic intoxication without complication ([facility]    4. Refusal of care by patient    5.  Left against medical advice          DISPOSITION/PLAN   DISPOSITION [place] 09/07/2022 06:33:30 PM      PATIENT REFERRED TO:  [facility] Emergency Department  [address] [facility]  [address] [address]  [phone]  Go to   For symptom re-evaluation, If symptoms worsen      DISCHARGE MEDICATIONS:  Discharge Medication List as of 9/7/2022  7:02 PM          DISCONTINUED MEDICATIONS:  Discharge Medication List as of 9/7/2022  7:02 PM                 (Please note that portions of this note were completed with a voice recognition program.  Efforts were made to edit the dictations but occasionally words are mis-transcribed.)    Aquilino Levy PA-C (electronically signed)            Aquilino Levy PA-C  09/07/22 2017       Aquilino Levy PA-C  09/07/22 2018

## 2022-09-12 DIAGNOSIS — E78.5 HYPERLIPIDEMIA, UNSPECIFIED HYPERLIPIDEMIA TYPE: ICD-10-CM

## 2022-09-12 DIAGNOSIS — G89.29 CHRONIC LEFT-SIDED LOW BACK PAIN WITH LEFT-SIDED SCIATICA: ICD-10-CM

## 2022-09-12 DIAGNOSIS — I10 ESSENTIAL HYPERTENSION: ICD-10-CM

## 2022-09-12 DIAGNOSIS — M54.42 CHRONIC LEFT-SIDED LOW BACK PAIN WITH LEFT-SIDED SCIATICA: ICD-10-CM

## 2022-09-12 RX ORDER — OXYCODONE AND ACETAMINOPHEN 7.5; 325 MG/1; MG/1
1 TABLET ORAL EVERY 6 HOURS PRN
Qty: 120 TABLET | Refills: 0 | Status: CANCELLED | OUTPATIENT
Start: 2022-09-12 | End: 2022-10-12

## 2022-09-12 RX ORDER — AMLODIPINE BESYLATE 5 MG/1
TABLET ORAL
Qty: 90 TABLET | Refills: 2 | Status: CANCELLED | OUTPATIENT
Start: 2022-09-12

## 2022-09-12 RX ORDER — ATORVASTATIN CALCIUM 10 MG/1
TABLET, FILM COATED ORAL
Qty: 90 TABLET | Refills: 2 | Status: SHIPPED | OUTPATIENT
Start: 2022-09-12

## 2022-09-12 RX ORDER — ATORVASTATIN CALCIUM 10 MG/1
TABLET, FILM COATED ORAL
Qty: 90 TABLET | Refills: 2 | Status: CANCELLED | OUTPATIENT
Start: 2022-09-12

## 2022-09-12 RX ORDER — OXYCODONE AND ACETAMINOPHEN 7.5; 325 MG/1; MG/1
1 TABLET ORAL EVERY 6 HOURS PRN
Qty: 120 TABLET | Refills: 0 | Status: SHIPPED | OUTPATIENT
Start: 2022-09-12 | End: 2022-10-12 | Stop reason: SDUPTHER

## 2022-09-12 RX ORDER — AMLODIPINE BESYLATE 5 MG/1
TABLET ORAL
Qty: 90 TABLET | Refills: 2 | Status: SHIPPED | OUTPATIENT
Start: 2022-09-12

## 2022-09-12 NOTE — TELEPHONE ENCOUNTER
Medication:   Requested Prescriptions     Pending Prescriptions Disp Refills    amLODIPine (NORVASC) 5 MG tablet 90 tablet 2     Sig: TAKE 1 TABLET BY MOUTH EVERY DAY    atorvastatin (LIPITOR) 10 MG tablet 90 tablet 2     Sig: TAKE 1 TABLET BY MOUTH EVERY DAY    oxyCODONE-acetaminophen (PERCOCET) 7.5-325 MG per tablet 120 tablet 0     Sig: Take 1 tablet by mouth every 6 hours as needed for Pain for up to 30 days. Intended supply: 30 days        Last Filled:  3/18//2022, 08/15/95180  Patient Phone Number: [phone] (home)     Last appt: 8/23/2022   Next appt: 12/1/2022    Last OARRS:   RX Monitoring 8/23/2022   Attestation -   Periodic Controlled Substance Monitoring No signs of potential drug abuse or diversion identified.

## 2022-09-12 NOTE — TELEPHONE ENCOUNTER
Medication and Quantity requested:    atorvastatin (LIPITOR) tablet 10 mg  And    amLODIPine (NORVASC) tablet 5 mg   And  oxyCODONE-acetaminophen (PERCOCET) 7.5-325 MG per tablet 1 tablet    Last Visit  08/23/2022    Pharmacy and phone number updated in EPIC:  yes    Rolando Augustin

## 2022-09-12 NOTE — TELEPHONE ENCOUNTER
Medication:   Requested Prescriptions     Pending Prescriptions Disp Refills    atorvastatin (LIPITOR) 10 MG tablet 90 tablet 2     Sig: TAKE 1 TABLET BY MOUTH EVERY DAY    amLODIPine (NORVASC) 5 MG tablet 90 tablet 2     Sig: TAKE 1 TABLET BY MOUTH EVERY DAY    oxyCODONE-acetaminophen (PERCOCET) 7.5-325 MG per tablet 120 tablet 0     Sig: Take 1 tablet by mouth every 6 hours as needed for Pain for up to 30 days.  Intended supply: 30 days       Last Filled:  3/18/22 with 2 refill    Patient Phone Number: [phone] (home)     Last appt: 8/23/2022   Next appt: 10/25/2022    Lab Results   Component Value Date     08/18/2022    K 4.0 08/18/2022     08/18/2022    CO2 26 08/18/2022    BUN 17 08/18/2022    CREATININE 0.6 (L) 08/18/2022    GLUCOSE 97 08/18/2022    CALCIUM 9.1 08/18/2022    PROT 7.3 09/20/2021    LABALBU 4.5 09/20/2021    BILITOT 0.6 09/20/2021    ALKPHOS 78 09/20/2021    AST 28 09/20/2021    ALT 34 09/20/2021    LABGLOM >60 08/18/2022    GFRAA >60 08/18/2022    AGRATIO 1.6 09/20/2021    GLOB 2.8 09/20/2021

## 2022-09-19 DIAGNOSIS — F41.9 ANXIETY: ICD-10-CM

## 2022-09-20 ENCOUNTER — OFFICE VISIT (OUTPATIENT)
Dept: FAMILY MEDICINE CLINIC | Age: 62
End: 2022-09-20
Payer: MEDICARE

## 2022-09-20 VITALS
HEIGHT: 70 IN | WEIGHT: 212.8 LBS | BODY MASS INDEX: 30.46 KG/M2 | HEART RATE: 98 BPM | SYSTOLIC BLOOD PRESSURE: 140 MMHG | DIASTOLIC BLOOD PRESSURE: 80 MMHG | OXYGEN SATURATION: 95 %

## 2022-09-20 DIAGNOSIS — M54.50 ACUTE LEFT-SIDED LOW BACK PAIN WITHOUT SCIATICA: Primary | ICD-10-CM

## 2022-09-20 DIAGNOSIS — R06.02 SHORTNESS OF BREATH: ICD-10-CM

## 2022-09-20 DIAGNOSIS — Z09 HOSPITAL DISCHARGE FOLLOW-UP: ICD-10-CM

## 2022-09-20 PROCEDURE — 99214 OFFICE O/P EST MOD 30 MIN: CPT | Performed by: FAMILY MEDICINE

## 2022-09-20 PROCEDURE — 1111F DSCHRG MED/CURRENT MED MERGE: CPT | Performed by: FAMILY MEDICINE

## 2022-09-20 RX ORDER — METHYLPREDNISOLONE 4 MG/1
TABLET ORAL
Qty: 1 KIT | Refills: 0 | Status: SHIPPED | OUTPATIENT
Start: 2022-09-20

## 2022-09-20 RX ORDER — ALPRAZOLAM 1 MG/1
TABLET ORAL
Qty: 90 TABLET | Refills: 0 | Status: SHIPPED | OUTPATIENT
Start: 2022-09-20 | End: 2022-10-19

## 2022-09-20 RX ORDER — TIZANIDINE 4 MG/1
4 TABLET ORAL 3 TIMES DAILY
Qty: 30 TABLET | Refills: 1 | Status: SHIPPED | OUTPATIENT
Start: 2022-09-20 | End: 2022-10-03 | Stop reason: SDUPTHER

## 2022-09-20 ASSESSMENT — PATIENT HEALTH QUESTIONNAIRE - PHQ9
SUM OF ALL RESPONSES TO PHQ QUESTIONS 1-9: 0
1. LITTLE INTEREST OR PLEASURE IN DOING THINGS: 0
SUM OF ALL RESPONSES TO PHQ QUESTIONS 1-9: 0
SUM OF ALL RESPONSES TO PHQ9 QUESTIONS 1 & 2: 0
2. FEELING DOWN, DEPRESSED OR HOPELESS: 0
SUM OF ALL RESPONSES TO PHQ QUESTIONS 1-9: 0
SUM OF ALL RESPONSES TO PHQ QUESTIONS 1-9: 0

## 2022-09-20 NOTE — TELEPHONE ENCOUNTER
Medication:   Requested Prescriptions     Pending Prescriptions Disp Refills    ALPRAZolam (XANAX) 1 MG tablet 90 tablet 0     Si tab as needed 3 times daily        Last Filled:  2022    Patient Phone Number: [phone] (home)     Last appt: 2022   Next appt: 2022    Last OARRS:   RX Monitoring 2022   Attestation -   Periodic Controlled Substance Monitoring No signs of potential drug abuse or diversion identified.
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
## 2022-09-27 ENCOUNTER — TELEPHONE (OUTPATIENT)
Dept: FAMILY MEDICINE CLINIC | Age: 62
End: 2022-09-27

## 2022-09-27 DIAGNOSIS — R06.02 SHORTNESS OF BREATH: Primary | ICD-10-CM

## 2022-09-27 RX ORDER — UMECLIDINIUM BROMIDE AND VILANTEROL TRIFENATATE 62.5; 25 UG/1; UG/1
1 POWDER RESPIRATORY (INHALATION) DAILY
Qty: 1 EACH | Refills: 0 | COMMUNITY
Start: 2022-09-27

## 2022-09-27 RX ORDER — TIOTROPIUM BROMIDE INHALATION SPRAY 1.56 UG/1
2 SPRAY, METERED RESPIRATORY (INHALATION) DAILY
Qty: 1 EACH | Refills: 0 | COMMUNITY
Start: 2022-09-27 | End: 2022-10-05

## 2022-09-27 NOTE — TELEPHONE ENCOUNTER
Patient is asking for a sample of Anora or Spiriva, or both if we have them. That way he can try both.  Then when patient comes in on 10/03/2022 he will be able to tell which one works the best. Please advise

## 2022-10-03 ENCOUNTER — OFFICE VISIT (OUTPATIENT)
Dept: FAMILY MEDICINE CLINIC | Age: 62
End: 2022-10-03
Payer: MEDICARE

## 2022-10-03 VITALS
BODY MASS INDEX: 32.05 KG/M2 | OXYGEN SATURATION: 98 % | WEIGHT: 223.4 LBS | DIASTOLIC BLOOD PRESSURE: 76 MMHG | SYSTOLIC BLOOD PRESSURE: 146 MMHG | HEART RATE: 94 BPM

## 2022-10-03 DIAGNOSIS — M54.50 ACUTE LEFT-SIDED LOW BACK PAIN WITHOUT SCIATICA: Primary | ICD-10-CM

## 2022-10-03 DIAGNOSIS — J43.9 PULMONARY EMPHYSEMA, UNSPECIFIED EMPHYSEMA TYPE (HCC): ICD-10-CM

## 2022-10-03 PROCEDURE — G8417 CALC BMI ABV UP PARAM F/U: HCPCS | Performed by: FAMILY MEDICINE

## 2022-10-03 PROCEDURE — G8484 FLU IMMUNIZE NO ADMIN: HCPCS | Performed by: FAMILY MEDICINE

## 2022-10-03 PROCEDURE — G8427 DOCREV CUR MEDS BY ELIG CLIN: HCPCS | Performed by: FAMILY MEDICINE

## 2022-10-03 PROCEDURE — 3023F SPIROM DOC REV: CPT | Performed by: FAMILY MEDICINE

## 2022-10-03 PROCEDURE — 3017F COLORECTAL CA SCREEN DOC REV: CPT | Performed by: FAMILY MEDICINE

## 2022-10-03 PROCEDURE — 4004F PT TOBACCO SCREEN RCVD TLK: CPT | Performed by: FAMILY MEDICINE

## 2022-10-03 PROCEDURE — 99214 OFFICE O/P EST MOD 30 MIN: CPT | Performed by: FAMILY MEDICINE

## 2022-10-03 RX ORDER — ALBUTEROL SULFATE 90 UG/1
2 AEROSOL, METERED RESPIRATORY (INHALATION) EVERY 6 HOURS PRN
Qty: 18 G | Refills: 3 | Status: SHIPPED | OUTPATIENT
Start: 2022-10-03

## 2022-10-03 RX ORDER — TIZANIDINE 4 MG/1
4 TABLET ORAL 3 TIMES DAILY
Qty: 30 TABLET | Refills: 1 | Status: SHIPPED | OUTPATIENT
Start: 2022-10-03

## 2022-10-03 ASSESSMENT — PATIENT HEALTH QUESTIONNAIRE - PHQ9
SUM OF ALL RESPONSES TO PHQ QUESTIONS 1-9: 2
8. MOVING OR SPEAKING SO SLOWLY THAT OTHER PEOPLE COULD HAVE NOTICED. OR THE OPPOSITE, BEING SO FIGETY OR RESTLESS THAT YOU HAVE BEEN MOVING AROUND A LOT MORE THAN USUAL: 0
6. FEELING BAD ABOUT YOURSELF - OR THAT YOU ARE A FAILURE OR HAVE LET YOURSELF OR YOUR FAMILY DOWN: 0
9. THOUGHTS THAT YOU WOULD BE BETTER OFF DEAD, OR OF HURTING YOURSELF: 0
7. TROUBLE CONCENTRATING ON THINGS, SUCH AS READING THE NEWSPAPER OR WATCHING TELEVISION: 0
2. FEELING DOWN, DEPRESSED OR HOPELESS: 2
3. TROUBLE FALLING OR STAYING ASLEEP: 0
SUM OF ALL RESPONSES TO PHQ QUESTIONS 1-9: 2
SUM OF ALL RESPONSES TO PHQ9 QUESTIONS 1 & 2: 2
5. POOR APPETITE OR OVEREATING: 0
10. IF YOU CHECKED OFF ANY PROBLEMS, HOW DIFFICULT HAVE THESE PROBLEMS MADE IT FOR YOU TO DO YOUR WORK, TAKE CARE OF THINGS AT HOME, OR GET ALONG WITH OTHER PEOPLE: 0
4. FEELING TIRED OR HAVING LITTLE ENERGY: 0
1. LITTLE INTEREST OR PLEASURE IN DOING THINGS: 0
SUM OF ALL RESPONSES TO PHQ QUESTIONS 1-9: 2
SUM OF ALL RESPONSES TO PHQ QUESTIONS 1-9: 2
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
## 2022-10-05 DIAGNOSIS — J43.9 PULMONARY EMPHYSEMA, UNSPECIFIED EMPHYSEMA TYPE (HCC): Primary | ICD-10-CM

## 2022-10-10 ENCOUNTER — TELEPHONE (OUTPATIENT)
Dept: FAMILY MEDICINE CLINIC | Age: 62
End: 2022-10-10
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
## 2022-10-11 ENCOUNTER — OFFICE VISIT (OUTPATIENT)
Dept: FAMILY MEDICINE CLINIC | Age: 62
End: 2022-10-11
Payer: MEDICARE

## 2022-10-11 VITALS
SYSTOLIC BLOOD PRESSURE: 146 MMHG | BODY MASS INDEX: 31.92 KG/M2 | OXYGEN SATURATION: 97 % | WEIGHT: 223 LBS | DIASTOLIC BLOOD PRESSURE: 88 MMHG | HEART RATE: 86 BPM | HEIGHT: 70 IN

## 2022-10-11 DIAGNOSIS — G89.29 CHRONIC MIDLINE LOW BACK PAIN WITHOUT SCIATICA: Primary | ICD-10-CM

## 2022-10-11 DIAGNOSIS — M54.50 CHRONIC MIDLINE LOW BACK PAIN WITHOUT SCIATICA: Primary | ICD-10-CM

## 2022-10-11 DIAGNOSIS — M54.2 NECK PAIN: ICD-10-CM

## 2022-10-11 PROCEDURE — 99213 OFFICE O/P EST LOW 20 MIN: CPT | Performed by: FAMILY MEDICINE

## 2022-10-11 PROCEDURE — 3017F COLORECTAL CA SCREEN DOC REV: CPT | Performed by: FAMILY MEDICINE

## 2022-10-11 PROCEDURE — 4004F PT TOBACCO SCREEN RCVD TLK: CPT | Performed by: FAMILY MEDICINE

## 2022-10-11 PROCEDURE — G8427 DOCREV CUR MEDS BY ELIG CLIN: HCPCS | Performed by: FAMILY MEDICINE

## 2022-10-11 PROCEDURE — G8417 CALC BMI ABV UP PARAM F/U: HCPCS | Performed by: FAMILY MEDICINE

## 2022-10-11 PROCEDURE — G8484 FLU IMMUNIZE NO ADMIN: HCPCS | Performed by: FAMILY MEDICINE

## 2022-10-11 ASSESSMENT — ENCOUNTER SYMPTOMS
RESPIRATORY NEGATIVE: 1
GASTROINTESTINAL NEGATIVE: 1
BACK PAIN: 1
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
## 2022-10-12 DIAGNOSIS — G89.29 CHRONIC LEFT-SIDED LOW BACK PAIN WITH LEFT-SIDED SCIATICA: ICD-10-CM

## 2022-10-12 DIAGNOSIS — M54.42 CHRONIC LEFT-SIDED LOW BACK PAIN WITH LEFT-SIDED SCIATICA: ICD-10-CM

## 2022-10-12 RX ORDER — OXYCODONE AND ACETAMINOPHEN 7.5; 325 MG/1; MG/1
1 TABLET ORAL EVERY 6 HOURS PRN
Qty: 120 TABLET | Refills: 0 | Status: SHIPPED | OUTPATIENT
Start: 2022-10-12 | End: 2022-11-11

## 2022-10-21 ENCOUNTER — TELEPHONE (OUTPATIENT)
Dept: FAMILY MEDICINE CLINIC | Age: 62
End: 2022-10-21

## 2022-10-21 DIAGNOSIS — M54.50 ACUTE LEFT-SIDED LOW BACK PAIN WITHOUT SCIATICA: Primary | ICD-10-CM

## 2022-10-21 NOTE — TELEPHONE ENCOUNTER
Pt called back and would like a cervical mri done as well if possible so he can get that done when he gets the lumbar mri. Pt said he will then see a pain management specialist afterwards.

## 2022-10-22 DIAGNOSIS — F41.9 ANXIETY: ICD-10-CM

## 2022-10-24 DIAGNOSIS — F41.9 ANXIETY: ICD-10-CM

## 2022-10-24 RX ORDER — ALPRAZOLAM 1 MG/1
TABLET ORAL
Qty: 90 TABLET | Refills: 0 | Status: SHIPPED | OUTPATIENT
Start: 2022-10-24 | End: 2022-11-21 | Stop reason: SDUPTHER

## 2022-10-24 RX ORDER — ALPRAZOLAM 1 MG/1
TABLET ORAL
Qty: 90 TABLET | Refills: 0 | OUTPATIENT
Start: 2022-10-24 | End: 2022-11-24

## 2022-10-24 NOTE — TELEPHONE ENCOUNTER
Patient is out of medication  Patient states Dr. Yvrose Webb is the only doctor he sees, 2 encounters for the Alprazolam

## 2022-10-24 NOTE — TELEPHONE ENCOUNTER
Lov 10/11/22  Lrf  90 0 9/20/22 Medication:   Requested Prescriptions     Pending Prescriptions Disp Refills    ALPRAZolam (XANAX) 1 MG tablet [Pharmacy Med Name: ALPRAZolam Oral Tablet 1 MG] 90 tablet 0     Sig: TAKE 1 TABLET BY MOUTH THREE TIMES A DAY AS NEEDED        Last Filled:      Patient Phone Number: [phone] (home)     Last appt: 10/11/2022   Next appt: 10/25/2022    Last OARRS:   RX Monitoring 10/11/2022   Attestation -   Periodic Controlled Substance Monitoring No signs of potential drug abuse or diversion identified.

## 2022-10-24 NOTE — TELEPHONE ENCOUNTER
Medication:   Requested Prescriptions     Pending Prescriptions Disp Refills    ALPRAZolam (XANAX) 1 MG tablet [Pharmacy Med Name: ALPRAZolam Oral Tablet 1 MG] 90 tablet 0     Sig: TAKE 1 TABLET BY MOUTH THREE TIMES DAILY        Last Filled:  90 x 0 RF 9/20/22    Patient Phone Number: [phone] (home)     Last appt: 10/11/2022   Next appt: 10/25/2022    Last OARRS:   RX Monitoring 10/11/2022   Attestation -   Periodic Controlled Substance Monitoring No signs of potential drug abuse or diversion identified.

## 2022-10-25 ENCOUNTER — OFFICE VISIT (OUTPATIENT)
Dept: FAMILY MEDICINE CLINIC | Age: 62
End: 2022-10-25
Payer: MEDICARE

## 2022-10-25 VITALS
DIASTOLIC BLOOD PRESSURE: 80 MMHG | SYSTOLIC BLOOD PRESSURE: 162 MMHG | HEART RATE: 91 BPM | WEIGHT: 223 LBS | BODY MASS INDEX: 31.92 KG/M2 | OXYGEN SATURATION: 95 % | HEIGHT: 70 IN

## 2022-10-25 DIAGNOSIS — I10 ESSENTIAL HYPERTENSION: ICD-10-CM

## 2022-10-25 DIAGNOSIS — G89.29 CHRONIC MIDLINE LOW BACK PAIN WITHOUT SCIATICA: Primary | ICD-10-CM

## 2022-10-25 DIAGNOSIS — M54.50 CHRONIC MIDLINE LOW BACK PAIN WITHOUT SCIATICA: Primary | ICD-10-CM

## 2022-10-25 PROCEDURE — 99213 OFFICE O/P EST LOW 20 MIN: CPT | Performed by: FAMILY MEDICINE

## 2022-10-25 RX ORDER — HYDROCHLOROTHIAZIDE 12.5 MG/1
12.5 TABLET ORAL DAILY
Qty: 30 TABLET | Refills: 3 | Status: SHIPPED | OUTPATIENT
Start: 2022-10-25 | End: 2022-11-21 | Stop reason: SDUPTHER

## 2022-10-25 ASSESSMENT — ENCOUNTER SYMPTOMS
BACK PAIN: 1
GASTROINTESTINAL NEGATIVE: 1
RESPIRATORY NEGATIVE: 1

## 2022-10-25 NOTE — PROGRESS NOTES
Silas Lackey (:  1960) is a [address] [facility] male,Established patient, here for evaluation of the following chief complaint(s):  Back Pain         ASSESSMENT/PLAN:  1. Chronic midline low back pain without sciatica  -     External Referral To Pain Clinic. He will be seeing Dr Riaz Vigil and will switch WC over to him  Discussed Neurosurgeon/Ortho for consult. He wants to wait. We also discussed epidural steroid injection which he states he has had in the past without help. He also declined physical therapy. Return as needed  Essential hypertension  Add  -     hydroCHLOROthiazide (HYDRODIURIL) 12.5 MG tablet; Take 1 tablet by mouth daily  Home BP checks  Return 1 month          Return if symptoms worsen or fail to improve. Subjective   SUBJECTIVE/OBJECTIVE:  HPI  Continues with back pain which remains constant and requiring Oxycodone which is still not totally relieving the pain. Also has headaches and neck pain. He states 1 night his legs felt numb and gave out on him. He feels his legs are getting weaker as well  He states he has contacted Dr Riaz Vigil for a consultation and will use him as pain management and will switch workmans comp over him    Review of Systems   Constitutional:  Positive for activity change and fatigue. Respiratory: Negative. Cardiovascular: Negative. Gastrointestinal: Negative. Genitourinary: Negative. Musculoskeletal:  Positive for arthralgias, back pain and neck pain. Neurological:  Positive for numbness. Psychiatric/Behavioral:  The patient is nervous/anxious. Objective   Physical Exam  Constitutional:       General: He is not in acute distress. Appearance: He is not ill-appearing, toxic-appearing or diaphoretic. HENT:      Head: Normocephalic and atraumatic. Eyes:      Conjunctiva/sclera: Conjunctivae normal.   Musculoskeletal:      Lumbar back: Decreased range of motion. Skin:     General: Skin is warm and dry.    Neurological: Mental Status: He is alert and oriented to person, place, and time. Psychiatric:         Mood and Affect: Mood normal.         Behavior: Behavior normal.         Thought Content: Thought content normal.         Judgment: Judgment normal.                An electronic signature was used to authenticate this note.     --Narcisa Carvajal MD

## 2022-10-28 ENCOUNTER — PATIENT MESSAGE (OUTPATIENT)
Dept: ENT CLINIC | Age: 62
End: 2022-10-28

## 2022-10-31 DIAGNOSIS — F33.2 SEVERE EPISODE OF RECURRENT MAJOR DEPRESSIVE DISORDER, WITHOUT PSYCHOTIC FEATURES (HCC): ICD-10-CM

## 2022-10-31 RX ORDER — VENLAFAXINE HYDROCHLORIDE 75 MG/1
CAPSULE, EXTENDED RELEASE ORAL
Qty: 90 CAPSULE | Refills: 1 | Status: SHIPPED | OUTPATIENT
Start: 2022-10-31

## 2022-10-31 NOTE — TELEPHONE ENCOUNTER
From: Jumana Alcantara  To: Dr. Israel Rausch: 10/28/2022 3:13 PM EDT  Subject: Dec 1st appointment    I'm just preparing as  is approaching fast. I have a visit to see you DEC 1st I'm wondering if I come in that day if need be will you go ahead and put the tubes in that day so I don't have to come back. I'd like to reschedule appointment for late [address] if possible if you were to put them in the day of the visit. Would like them put in as late as possible. Especially before  deductible reasons. Also as well my flight has change to a 13hr flight then 9hr flight then a final 4hr flight. I really feel like I will need them. Ears aren't plugged now don't feel fluid but maybe they feel like heavy whatever that might mean. Main thing is I don't want to leave and have clogging problem or cant get them to pop from flight and suffer for six weeks in the [place] with clogged ears and pain and I Definitely don't want to have to see a doctor there. Hope you understand. Please advise/inform     And Im thinking you will not be around due to holidays possibly so would like to see you before you vacation if that ends up being the situation so as late in the year as possible would be great.     Elena Clemente   42-

## 2022-11-01 ENCOUNTER — TELEPHONE (OUTPATIENT)
Dept: FAMILY MEDICINE CLINIC | Age: 62
End: 2022-11-01

## 2022-11-01 ENCOUNTER — TELEPHONE (OUTPATIENT)
Dept: ADMINISTRATIVE | Age: 62
End: 2022-11-01

## 2022-11-01 NOTE — TELEPHONE ENCOUNTER
Patient called and stated that he needs   A   [employer] form filled out for a couple medications  For workers comp to pay for his   Effexor xr   And his pain medication      Do not submit for his xanx they won't pay for it    He said he thinks he needs it filled out for only a couple more times until he can get with pain management     He is also asking about the c9 form and if it is complete

## 2022-11-07 NOTE — TELEPHONE ENCOUNTER
I spoke with patient re: C-9 forms  Any medications that needs approval by [facility] is completed by an MA, RN, or LPN

## 2022-11-14 DIAGNOSIS — G89.29 CHRONIC LEFT-SIDED LOW BACK PAIN WITH LEFT-SIDED SCIATICA: ICD-10-CM

## 2022-11-14 DIAGNOSIS — M54.42 CHRONIC LEFT-SIDED LOW BACK PAIN WITH LEFT-SIDED SCIATICA: ICD-10-CM

## 2022-11-14 RX ORDER — OXYCODONE AND ACETAMINOPHEN 7.5; 325 MG/1; MG/1
1 TABLET ORAL EVERY 6 HOURS PRN
Qty: 120 TABLET | Refills: 0 | Status: SHIPPED | OUTPATIENT
Start: 2022-11-14 | End: 2022-12-14

## 2022-11-14 NOTE — TELEPHONE ENCOUNTER
Medication and Quantity requested:  oxyCODONE-acetaminophen (PERCOCET) 7.5-325 MG per tablet - qty 120      Last Visit  10/25/22 - Dr Mcconnell Layer and phone number updated in EPIC:  yes    [facility]    (Pt has an appt with pain mgnt 11/16)

## 2022-11-14 NOTE — TELEPHONE ENCOUNTER
Medication:   Requested Prescriptions     Pending Prescriptions Disp Refills    oxyCODONE-acetaminophen (PERCOCET) 7.5-325 MG per tablet 120 tablet 0     Sig: Take 1 tablet by mouth every 6 hours as needed for Pain for up to 30 days. Intended supply: 30 days        Last Filled:  120  x0 RF 10/12/22    Patient Phone Number: [phone] (home)     Last appt: 10/25/2022   Next appt: 12/19/2022    Last OARRS:   RX Monitoring 10/11/2022   Attestation -   Periodic Controlled Substance Monitoring No signs of potential drug abuse or diversion identified.

## 2022-11-21 DIAGNOSIS — F41.9 ANXIETY: ICD-10-CM

## 2022-11-21 DIAGNOSIS — I10 ESSENTIAL HYPERTENSION: ICD-10-CM

## 2022-11-21 RX ORDER — HYDROCHLOROTHIAZIDE 12.5 MG/1
12.5 TABLET ORAL DAILY
Qty: 30 TABLET | Refills: 3 | Status: SHIPPED | OUTPATIENT
Start: 2022-11-21

## 2022-11-21 RX ORDER — ALPRAZOLAM 1 MG/1
TABLET ORAL
Qty: 90 TABLET | Refills: 0 | Status: SHIPPED | OUTPATIENT
Start: 2022-11-21 | End: 2022-12-22

## 2022-11-21 NOTE — TELEPHONE ENCOUNTER
Medication:   Requested Prescriptions     Pending Prescriptions Disp Refills    ALPRAZolam (XANAX) 1 MG tablet 90 tablet 0     Sig: TAKE 1 TABLET BY MOUTH THREE TIMES DAILY    hydroCHLOROthiazide (HYDRODIURIL) 12.5 MG tablet 30 tablet 3     Sig: Take 1 tablet by mouth daily        Last Filled:  90 x 0 RF 10/24/22    Patient Phone Number: [phone] (home)     Last appt: 10/25/2022   Next appt: 12/19/2022    Last OARRS:   RX Monitoring 10/11/2022   Attestation -   Periodic Controlled Substance Monitoring No signs of potential drug abuse or diversion identified.

## 2022-11-28 RX ORDER — ECHINACEA PURPUREA EXTRACT 125 MG
2 TABLET ORAL 4 TIMES DAILY
Qty: 1 EACH | Refills: 3 | Status: SHIPPED | OUTPATIENT
Start: 2022-11-28

## 2022-11-28 RX ORDER — FLUTICASONE PROPIONATE 50 MCG
2 SPRAY, SUSPENSION (ML) NASAL DAILY
Qty: 48 G | Refills: 1 | Status: SHIPPED | OUTPATIENT
Start: 2022-11-28

## 2022-12-08 ENCOUNTER — OFFICE VISIT (OUTPATIENT)
Dept: ENT CLINIC | Age: 62
End: 2022-12-08
Payer: MEDICARE

## 2022-12-08 VITALS
TEMPERATURE: 99.1 F | OXYGEN SATURATION: 98 % | SYSTOLIC BLOOD PRESSURE: 143 MMHG | BODY MASS INDEX: 29.63 KG/M2 | HEART RATE: 80 BPM | WEIGHT: 207 LBS | DIASTOLIC BLOOD PRESSURE: 78 MMHG | HEIGHT: 70 IN

## 2022-12-08 DIAGNOSIS — H61.22 IMPACTED CERUMEN OF LEFT EAR: ICD-10-CM

## 2022-12-08 DIAGNOSIS — H69.83 DYSFUNCTION OF BOTH EUSTACHIAN TUBES: Primary | ICD-10-CM

## 2022-12-08 PROCEDURE — 3074F SYST BP LT 130 MM HG: CPT | Performed by: STUDENT IN AN ORGANIZED HEALTH CARE EDUCATION/TRAINING PROGRAM

## 2022-12-08 PROCEDURE — G8417 CALC BMI ABV UP PARAM F/U: HCPCS | Performed by: STUDENT IN AN ORGANIZED HEALTH CARE EDUCATION/TRAINING PROGRAM

## 2022-12-08 PROCEDURE — 69210 REMOVE IMPACTED EAR WAX UNI: CPT | Performed by: STUDENT IN AN ORGANIZED HEALTH CARE EDUCATION/TRAINING PROGRAM

## 2022-12-08 PROCEDURE — 4004F PT TOBACCO SCREEN RCVD TLK: CPT | Performed by: STUDENT IN AN ORGANIZED HEALTH CARE EDUCATION/TRAINING PROGRAM

## 2022-12-08 PROCEDURE — G8484 FLU IMMUNIZE NO ADMIN: HCPCS | Performed by: STUDENT IN AN ORGANIZED HEALTH CARE EDUCATION/TRAINING PROGRAM

## 2022-12-08 PROCEDURE — 3078F DIAST BP <80 MM HG: CPT | Performed by: STUDENT IN AN ORGANIZED HEALTH CARE EDUCATION/TRAINING PROGRAM

## 2022-12-08 PROCEDURE — 3017F COLORECTAL CA SCREEN DOC REV: CPT | Performed by: STUDENT IN AN ORGANIZED HEALTH CARE EDUCATION/TRAINING PROGRAM

## 2022-12-08 PROCEDURE — G8427 DOCREV CUR MEDS BY ELIG CLIN: HCPCS | Performed by: STUDENT IN AN ORGANIZED HEALTH CARE EDUCATION/TRAINING PROGRAM

## 2022-12-08 PROCEDURE — 99213 OFFICE O/P EST LOW 20 MIN: CPT | Performed by: STUDENT IN AN ORGANIZED HEALTH CARE EDUCATION/TRAINING PROGRAM

## 2022-12-08 NOTE — PROGRESS NOTES
[address] [facility] FOLLOW-UP VISIT      Patient Name: Gurmeet Park Record Number:  [id number]  Primary Care Physician:  Lalitha Reeder MD    ChiefComplaint     Chief Complaint   Patient presents with    Ear Problem     Check ears for fluid, flying in ,        History of Present Illness     Linden Hernandez is an 63 y.o. male previously seen for chronic serous otitis media of left ear status post PE tube placement on 2022    Interval History:   He is going to the [facility] for 6 weeks in January. He is concerned about fluid building up behind his eardrums. Currently without ear issues, no otalgia or otorrhea. No recent hearing changes.       Past Medical History     Past Medical History:   Diagnosis Date    Alcoholism ([address] 1999    Anxiety     Back pain     Hyperlipidemia     Low HDL (under 40)     Prolonged emergence from general anesthesia     \"hard time breathing when waking up'    Spinal arthritis        Past Surgical History     Past Surgical History:   Procedure Laterality Date    COLONOSCOPY  10/18/2012    COLONOSCOPY N/A 2021    Dr Reymundo Rowland[de-identified] 2 polyps in cecum 10 mm polyp mid sigmoid, mild diverticulosis    SPINAL FUSION  10/2006    SPINE SURGERY  2008    l5-s1 hardware removal    TYMPANOSTOMY TUBE PLACEMENT Bilateral 2018    LEFT PRESSURE EQUALIZATION TUBE INSERTION AND EXAM UNDER ANESTHESIA OF NASOPHARYNX    VASECTOMY  2019       Family History     Family History   Problem Relation Age of Onset    Heart Disease Mother     High Blood Pressure Mother     Cancer Mother         female    Lung Cancer Mother 78        stage 4, age 78 after 2-6 months, 3/2021       Social History     Social History     Tobacco Use    Smoking status: Every Day     Packs/day: 1.50     Years: 43.00     Pack years: 64.50     Types: Cigarettes     Start date: 1977    Smokeless tobacco: Never   Vaping Use    Vaping Use: Never used   Substance Use Topics    Alcohol use: Not Currently     Alcohol/week: 2.0 standard drinks     Types: 2 Cans of beer per week     Comment: former heavy use; recovering alcoholic since 7637, approx    Drug use: No        Allergies     Allergies   Allergen Reactions    Nucynta [Tapentadol] Anaphylaxis     Hallucinations. Went to hospital.    Vicodin [Hydrocodone-Acetaminophen]      Itching      Codeine Rash     itching    Tylenol [Acetaminophen] Rash     Itching, pt states he is able to take       Medications     Current Outpatient Medications   Medication Sig Dispense Refill    sodium chloride (OCEAN) 0.65 % nasal spray 2 sprays by Nasal route 4 times daily 1 each 3    fluticasone (FLONASE) 50 MCG/ACT nasal spray 2 sprays by Each Nostril route daily 48 g 1    ALPRAZolam (XANAX) 1 MG tablet TAKE 1 TABLET BY MOUTH THREE TIMES DAILY 90 tablet 0    hydroCHLOROthiazide (HYDRODIURIL) 12.5 MG tablet Take 1 tablet by mouth daily 30 tablet 3    oxyCODONE-acetaminophen (PERCOCET) 7.5-325 MG per tablet Take 1 tablet by mouth every 6 hours as needed for Pain for up to 30 days.  Intended supply: 30 days 120 tablet 0    venlafaxine (EFFEXOR XR) 75 MG extended release capsule TAKE 1 CAPSULE BY MOUTH ONE TIME A DAY 90 capsule 1    atorvastatin (LIPITOR) 10 MG tablet TAKE 1 TABLET BY MOUTH EVERY DAY 90 tablet 2    amLODIPine (NORVASC) 5 MG tablet TAKE 1 TABLET BY MOUTH EVERY DAY 90 tablet 2    tiotropium (SPIRIVA RESPIMAT) 2.5 MCG/ACT AERS inhaler Inhale 2 puffs into the lungs daily (Patient not taking: Reported on 12/8/2022) 4 g 1    albuterol sulfate HFA (PROAIR HFA) 108 (90 Base) MCG/ACT inhaler Inhale 2 puffs into the lungs every 6 hours as needed for Wheezing 18 g 3    tiZANidine (ZANAFLEX) 4 MG tablet Take 1 tablet by mouth 3 times daily 30 tablet 1    umeclidinium-vilanterol (ANORO ELLIPTA) 62.5-25 MCG/INH AEPB inhaler Inhale 1 puff into the lungs daily 1 each 0    methylPREDNISolone (MEDROL DOSEPACK) 4 MG tablet Take by mouth. 1 kit 0     No current facility-administered medications for this visit. Review of Systems     REVIEW OF SYSTEM: See HPI above    PhysicalExam     Vitals:    12/08/22 1303   BP: (!) 143/78   Pulse: 80   Temp: 99.1 °F (37.3 °C)   TempSrc: Temporal   SpO2: 98%   Weight: 207 lb (93.9 kg)   Height: 5' 10\" (1.778 m)       PHYSICAL EXAM  BP (!) 143/78   Pulse 80   Temp 99.1 °F (37.3 °C) (Temporal)   Ht 5' 10\" (1.778 m)   Wt 207 lb (93.9 kg)   SpO2 98%   BMI 29.70 kg/m²     GENERAL: No acute distress, alert and oriented. EYES: EOMI, Anti-icteric. NOSE: On anterior rhinoscopy there is no epistaxis, nasal mucosa moist and normal appearing, no purulent drainage. EARS: Normal external appearance; on portable otomicroscopy:     -Ad: External auditory canal without stenosis, tympanic membrane with PE tube in posterior inferior quadrant. PE tube patent and in proper position. No otorrhea.     -As: External auditory canal with cerumen impaction, see procedure note below. I have performed a head and neck physical exam personally or was physically present during the key or critical portions of the service. Procedure     Procedure: Binocular otomicroscopy with debridement of cerumen impaction    Pre-op: Cerumen impaction of the left external auditory canal  Post op: Same  Procedure : Binocular otomicroscopy with debridement of cerumen of left external auditory canal  Surgeon: Dr. Whitney Johnson DO  Estimated Blood Loss: None    Description of Procedure:    After obtaining verbal consent, the patient was placed in the examination chair in the reclined position.      -Left ear: External auditory canal with occluding cerumen limiting visualization of the tympanic membrane which was removed with use of #7 and #5 [place]ian suction, alligator forceps, and cerumen loop curet. This hard cerumen was noted to be adherent to the tympanic membrane and obstructing the view of the tympanostomy tube.   After successful removal of cerumen, the left tympanic membrane was visualized with patent PE tube in posterior inferior quadrant in proper position without otorrhea. * Patient tolerated the procedure well with no complications      Assessment and Plan     1. Dysfunction of both eustachian tubes  -Bilateral PE tubes appear in proper position and patent. Hopefully they stay this way for the duration of his upcoming trip to the [facility]. To help further alleviate and prevent eustachian tube issues on his flight I have recommended that he use Afrin 1 hour prior to his flight and Sudafed the morning of his flight    2. Impacted cerumen of left ear  - Cerumen debrided in the office today  - Avoid Q-tip and self instrumentation of ears  - Hydrogen peroxide or Debrox (OTC) drops as needed or once every other week to help break up and soften wax  - Follow-up as needed for repeat debridement      Follow-Up     Return if symptoms worsen or fail to improve. Dr. [place] [id number]  Department of Otolaryngology/Head and Neck Surgery  12/8/22    Medical Decision Making: The following items were considered in medical decision making:  Independent review of images  Review / order clinical lab tests  Review / order radiology tests  Decision to obtain old records      This note was generated completely or in part utilizing Dragon dictation speech recognition software. Occasionally, words are mistranscribed and despite editing, the text may contain inaccuracies due to incorrect word recognition. If further clarification is needed please contact the office at [phone].

## 2022-12-08 NOTE — PATIENT INSTRUCTIONS
INSTRUCTIONS BEFORE FLYING:    -Use Afrin nasal spray (can be obtained over-the-counter) 2 sprays each nostril the morning of the flight and 1 hour prior to getting on the plane  -Consider taking a Sudafed the morning of your flight  -Attempt to pop your ears throughout the flight, especially when ascending and descending. Chewing gum can also help pop your ears throughout the flight.

## 2022-12-16 ENCOUNTER — OFFICE VISIT (OUTPATIENT)
Dept: ENT CLINIC | Age: 62
End: 2022-12-16

## 2022-12-16 DIAGNOSIS — H65.02 ACUTE SEROUS OTITIS MEDIA OF LEFT EAR, RECURRENCE NOT SPECIFIED: Primary | ICD-10-CM

## 2022-12-16 DIAGNOSIS — H69.83 DYSFUNCTION OF BOTH EUSTACHIAN TUBES: ICD-10-CM

## 2022-12-16 RX ORDER — CIPROFLOXACIN AND DEXAMETHASONE 3; 1 MG/ML; MG/ML
4 SUSPENSION/ DROPS AURICULAR (OTIC) 2 TIMES DAILY
Qty: 1 EACH | Refills: 0 | Status: SHIPPED | OUTPATIENT
Start: 2022-12-16 | End: 2022-12-26

## 2022-12-16 NOTE — PROGRESS NOTES
standard drinks     Types: 2 Cans of beer per week     Comment: former heavy use; recovering alcoholic since 6918, approx    Drug use: No        Allergies     Allergies   Allergen Reactions    Nucynta [Tapentadol] Anaphylaxis     Hallucinations. Went to hospital.    Vicodin [Hydrocodone-Acetaminophen]      Itching      Codeine Rash     itching    Tylenol [Acetaminophen] Rash     Itching, pt states he is able to take       Medications     Current Outpatient Medications   Medication Sig Dispense Refill    ciprofloxacin-dexamethasone (CIPRODEX) 0.3-0.1 % otic suspension Place 4 drops into the left ear 2 times daily for 10 days 1 each 0    sodium chloride (OCEAN) 0.65 % nasal spray 2 sprays by Nasal route 4 times daily 1 each 3    fluticasone (FLONASE) 50 MCG/ACT nasal spray 2 sprays by Each Nostril route daily 48 g 1    ALPRAZolam (XANAX) 1 MG tablet TAKE 1 TABLET BY MOUTH THREE TIMES DAILY 90 tablet 0    hydroCHLOROthiazide (HYDRODIURIL) 12.5 MG tablet Take 1 tablet by mouth daily 30 tablet 3    venlafaxine (EFFEXOR XR) 75 MG extended release capsule TAKE 1 CAPSULE BY MOUTH ONE TIME A DAY 90 capsule 1    tiotropium (SPIRIVA RESPIMAT) 2.5 MCG/ACT AERS inhaler Inhale 2 puffs into the lungs daily (Patient not taking: Reported on 12/8/2022) 4 g 1    albuterol sulfate HFA (PROAIR HFA) 108 (90 Base) MCG/ACT inhaler Inhale 2 puffs into the lungs every 6 hours as needed for Wheezing 18 g 3    tiZANidine (ZANAFLEX) 4 MG tablet Take 1 tablet by mouth 3 times daily 30 tablet 1    umeclidinium-vilanterol (ANORO ELLIPTA) 62.5-25 MCG/INH AEPB inhaler Inhale 1 puff into the lungs daily 1 each 0    methylPREDNISolone (MEDROL DOSEPACK) 4 MG tablet Take by mouth. 1 kit 0    atorvastatin (LIPITOR) 10 MG tablet TAKE 1 TABLET BY MOUTH EVERY DAY 90 tablet 2    amLODIPine (NORVASC) 5 MG tablet TAKE 1 TABLET BY MOUTH EVERY DAY 90 tablet 2     No current facility-administered medications for this visit.        Review of Systems     REVIEW OF placement but will treat for middle ear effusion as well. Start Ciprodex to left ear twice daily x10 days. He will follow-up in 10 days  - ciprofloxacin-dexamethasone (CIPRODEX) 0.3-0.1 % otic suspension; Place 4 drops into the left ear 2 times daily for 10 days  Dispense: 1 each; Refill: 0    2. Dysfunction of both eustachian tubes  -Continue Flonase daily    Follow-Up     Return in about 10 days (around 12/26/2022). Karel Forrester   Department of Otolaryngology/Head and Neck Surgery  12/16/22    Medical Decision Making: The following items were considered in medical decision making:  Independent review of images  Review / order clinical lab tests  Review / order radiology tests  Decision to obtain old records      This note was generated completely or in part utilizing Dragon dictation speech recognition software. Occasionally, words are mistranscribed and despite editing, the text may contain inaccuracies due to incorrect word recognition. If further clarification is needed please contact the office at [phone].

## 2022-12-19 ENCOUNTER — OFFICE VISIT (OUTPATIENT)
Dept: FAMILY MEDICINE CLINIC | Age: 62
End: 2022-12-19
Payer: MEDICARE

## 2022-12-19 VITALS
DIASTOLIC BLOOD PRESSURE: 84 MMHG | SYSTOLIC BLOOD PRESSURE: 140 MMHG | HEART RATE: 82 BPM | OXYGEN SATURATION: 96 % | WEIGHT: 211.4 LBS | BODY MASS INDEX: 30.33 KG/M2

## 2022-12-19 DIAGNOSIS — I10 ESSENTIAL HYPERTENSION: Primary | ICD-10-CM

## 2022-12-19 DIAGNOSIS — J43.9 PULMONARY EMPHYSEMA, UNSPECIFIED EMPHYSEMA TYPE (HCC): ICD-10-CM

## 2022-12-19 DIAGNOSIS — F33.2 SEVERE EPISODE OF RECURRENT MAJOR DEPRESSIVE DISORDER, WITHOUT PSYCHOTIC FEATURES (HCC): ICD-10-CM

## 2022-12-19 DIAGNOSIS — F41.9 ANXIETY: ICD-10-CM

## 2022-12-19 DIAGNOSIS — N52.9 ERECTILE DYSFUNCTION, UNSPECIFIED ERECTILE DYSFUNCTION TYPE: ICD-10-CM

## 2022-12-19 PROCEDURE — 3078F DIAST BP <80 MM HG: CPT | Performed by: FAMILY MEDICINE

## 2022-12-19 PROCEDURE — 4004F PT TOBACCO SCREEN RCVD TLK: CPT | Performed by: FAMILY MEDICINE

## 2022-12-19 PROCEDURE — 3074F SYST BP LT 130 MM HG: CPT | Performed by: FAMILY MEDICINE

## 2022-12-19 PROCEDURE — 3023F SPIROM DOC REV: CPT | Performed by: FAMILY MEDICINE

## 2022-12-19 PROCEDURE — 99214 OFFICE O/P EST MOD 30 MIN: CPT | Performed by: FAMILY MEDICINE

## 2022-12-19 PROCEDURE — G8427 DOCREV CUR MEDS BY ELIG CLIN: HCPCS | Performed by: FAMILY MEDICINE

## 2022-12-19 PROCEDURE — G8484 FLU IMMUNIZE NO ADMIN: HCPCS | Performed by: FAMILY MEDICINE

## 2022-12-19 PROCEDURE — 3017F COLORECTAL CA SCREEN DOC REV: CPT | Performed by: FAMILY MEDICINE

## 2022-12-19 PROCEDURE — G8417 CALC BMI ABV UP PARAM F/U: HCPCS | Performed by: FAMILY MEDICINE

## 2022-12-19 RX ORDER — ALPRAZOLAM 1 MG/1
TABLET ORAL
Qty: 90 TABLET | Refills: 0 | Status: SHIPPED | OUTPATIENT
Start: 2022-12-19 | End: 2023-01-19

## 2022-12-19 RX ORDER — SILDENAFIL 100 MG/1
100 TABLET, FILM COATED ORAL DAILY PRN
Qty: 30 TABLET | Refills: 1 | Status: SHIPPED | OUTPATIENT
Start: 2022-12-19

## 2022-12-19 ASSESSMENT — ENCOUNTER SYMPTOMS
SHORTNESS OF BREATH: 1
GASTROINTESTINAL NEGATIVE: 1
BACK PAIN: 1

## 2022-12-19 NOTE — PROGRESS NOTES
Jorge Pittman (:  1960) is a 63 y.o. male,Established patient, here for evaluation of the following chief complaint(s): Other (Going to the [facility] and wants to make sure all meds are correct and filled )      Assessment/Plan:    Essential hypertension  Borderline/reasonably well-controlled. Continue current treatment. Return 3 months when back from the [facility]. Home blood pressure checks  Anxiety  -     ALPRAZolam (XANAX) 1 MG tablet; TAKE 1 TABLET BY MOUTH THREE TIMES DAILY  OARRS report reviewed and no inconsistencies noted   The patient understands the risks of dependency/addiction with alprazolam and will take as little as possible and discontinue as soon as possible   Severe episode of recurrent major depressive disorder, without psychotic features ([facility] [address]  See below note about increasing Effexor  Pulmonary emphysema, unspecified emphysema type ([facility] [address]  -    Patient wanted a written prescription as well as a sample. He states the written prescription is to take with him since he is going to for an airports on the way to the [facility] he wants to make sure they know that his sample is legitimately prescribed for him. Prescription  : tiotropium (SPIRIVA RESPIMAT) 2.5 MCG/ACT AERS inhaler; Inhale 2 puffs into the lungs daily  Sample  -     tiotropium (SPIRIVA RESPIMAT) 2.5 MCG/ACT AERS inhaler; Inhale 2 puffs into the lungs daily    Erectile dysfunction, unspecified erectile dysfunction type  Patient request for  -     sildenafil (VIAGRA) 100 MG tablet; Take 1 tablet by mouth daily as needed for Erectile Dysfunction         Return in about 2 months (around 2023). Subjective   SUBJECTIVE/OBJECTIVE:  HPI  Hypertension: Patient here for follow-up of elevated blood pressure. Blood pressure is in adequate range at home. Patient denies chest pain and irregular heart beat. He denies side effects from medication.   Anxiety/Depression: Patient states he is leaving for the [facility] on January 11 and will return on February 23. During the time he is over in the [place] he would like to start to reduce and get off of his Xanax hopefully substituting it with Effexor by going from 75 to 150 mg or even 225 mg with the Effexor. COPD: He feels that Spiriva helps better than Anoro. See above note did assessment plan  ED: would like written Rx for Sildenafil to shop at [facility]     Review of Systems   Constitutional:  Positive for activity change. Respiratory:  Positive for shortness of breath (mild). Cardiovascular: Negative. Gastrointestinal: Negative. Endocrine: Negative. Genitourinary: Negative. Musculoskeletal:  Positive for back pain. Neurological: Negative. Psychiatric/Behavioral:  Positive for dysphoric mood. The patient is nervous/anxious. Objective   Physical Exam  Constitutional:       General: He is not in acute distress. Appearance: He is not ill-appearing, toxic-appearing or diaphoretic. HENT:      Head: Normocephalic and atraumatic. Eyes:      Conjunctiva/sclera: Conjunctivae normal.   Skin:     General: Skin is warm and dry. Neurological:      Mental Status: He is alert and oriented to person, place, and time. Psychiatric:         Mood and Affect: Mood normal.         Behavior: Behavior normal.         Thought Content: Thought content normal.         Judgment: Judgment normal.                An electronic signature was used to authenticate this note.     --Salvador Chauhan MD

## 2022-12-29 ENCOUNTER — OFFICE VISIT (OUTPATIENT)
Dept: ENT CLINIC | Age: 62
End: 2022-12-29
Payer: MEDICARE

## 2022-12-29 ENCOUNTER — PATIENT MESSAGE (OUTPATIENT)
Dept: ENT CLINIC | Age: 62
End: 2022-12-29

## 2022-12-29 VITALS
DIASTOLIC BLOOD PRESSURE: 78 MMHG | TEMPERATURE: 99.1 F | SYSTOLIC BLOOD PRESSURE: 147 MMHG | WEIGHT: 211 LBS | BODY MASS INDEX: 30.28 KG/M2 | OXYGEN SATURATION: 97 % | HEART RATE: 85 BPM

## 2022-12-29 DIAGNOSIS — J30.9 ALLERGIC RHINITIS, UNSPECIFIED SEASONALITY, UNSPECIFIED TRIGGER: ICD-10-CM

## 2022-12-29 DIAGNOSIS — H69.83 DYSFUNCTION OF BOTH EUSTACHIAN TUBES: Primary | ICD-10-CM

## 2022-12-29 DIAGNOSIS — R09.82 PND (POST-NASAL DRIP): ICD-10-CM

## 2022-12-29 DIAGNOSIS — H65.02 ACUTE SEROUS OTITIS MEDIA OF LEFT EAR, RECURRENCE NOT SPECIFIED: ICD-10-CM

## 2022-12-29 PROCEDURE — 4004F PT TOBACCO SCREEN RCVD TLK: CPT | Performed by: STUDENT IN AN ORGANIZED HEALTH CARE EDUCATION/TRAINING PROGRAM

## 2022-12-29 PROCEDURE — 99213 OFFICE O/P EST LOW 20 MIN: CPT | Performed by: STUDENT IN AN ORGANIZED HEALTH CARE EDUCATION/TRAINING PROGRAM

## 2022-12-29 PROCEDURE — G8427 DOCREV CUR MEDS BY ELIG CLIN: HCPCS | Performed by: STUDENT IN AN ORGANIZED HEALTH CARE EDUCATION/TRAINING PROGRAM

## 2022-12-29 PROCEDURE — 3078F DIAST BP <80 MM HG: CPT | Performed by: STUDENT IN AN ORGANIZED HEALTH CARE EDUCATION/TRAINING PROGRAM

## 2022-12-29 PROCEDURE — 3017F COLORECTAL CA SCREEN DOC REV: CPT | Performed by: STUDENT IN AN ORGANIZED HEALTH CARE EDUCATION/TRAINING PROGRAM

## 2022-12-29 PROCEDURE — G8417 CALC BMI ABV UP PARAM F/U: HCPCS | Performed by: STUDENT IN AN ORGANIZED HEALTH CARE EDUCATION/TRAINING PROGRAM

## 2022-12-29 PROCEDURE — 3074F SYST BP LT 130 MM HG: CPT | Performed by: STUDENT IN AN ORGANIZED HEALTH CARE EDUCATION/TRAINING PROGRAM

## 2022-12-29 PROCEDURE — G8484 FLU IMMUNIZE NO ADMIN: HCPCS | Performed by: STUDENT IN AN ORGANIZED HEALTH CARE EDUCATION/TRAINING PROGRAM

## 2022-12-29 RX ORDER — LORATADINE 10 MG/1
10 TABLET ORAL DAILY
Qty: 30 TABLET | Refills: 2 | Status: SHIPPED | OUTPATIENT
Start: 2022-12-29

## 2022-12-29 NOTE — PROGRESS NOTES
[address] VISIT      Patient Name: Gurmeet Park Record Number:  [id number]  Primary Care Physician:  Mami Arce MD    ChiefComplaint     Chief Complaint   Patient presents with    Ear Problem     Ear issues        History of Present Illness     Saadia Green is an 63 y.o. male previously seen for chronic serous otitis media of left ear status post PE tube placement on 2022    Interval History:   Stopped drops on the . When swallowing without dentures can hear cracking in each ear. Using flonase daily. Wakes up with drainage in back of throat, resolved with nasal saline. No more H2O2 in ears. Going to [facility] on , will be gone for 6 weeks.      Past Medical History     Past Medical History:   Diagnosis Date    Alcoholism ([address] 1999    Anxiety     Back pain     Hyperlipidemia     Low HDL (under 40)     Prolonged emergence from general anesthesia     \"hard time breathing when waking up'    Spinal arthritis        Past Surgical History     Past Surgical History:   Procedure Laterality Date    COLONOSCOPY  10/18/2012    COLONOSCOPY N/A 2021    Dr Eldridge Krabbe[de-identified] 2 polyps in cecum 10 mm polyp mid sigmoid, mild diverticulosis    SPINAL FUSION  10/2006    SPINE SURGERY      l5-s1 hardware removal    TYMPANOSTOMY TUBE PLACEMENT Bilateral 2018    LEFT PRESSURE EQUALIZATION TUBE INSERTION AND EXAM UNDER ANESTHESIA OF NASOPHARYNX    VASECTOMY  2019       Family History     Family History   Problem Relation Age of Onset    Heart Disease Mother     High Blood Pressure Mother     Cancer Mother         female    Lung Cancer Mother 78        stage 4, age 78 after 2-6 months, 3/2021       Social History     Social History     Tobacco Use    Smoking status: Every Day     Packs/day: 1.50     Years: 43.00     Pack years: 64.50     Types: Cigarettes     Start date: 1977    Smokeless tobacco: Never Vaping Use    Vaping Use: Never used   Substance Use Topics    Alcohol use: Not Currently     Alcohol/week: 2.0 standard drinks     Types: 2 Cans of beer per week     Comment: former heavy use; recovering alcoholic since 2826, approx    Drug use: No        Allergies     Allergies   Allergen Reactions    Nucynta [Tapentadol] Anaphylaxis     Hallucinations. Went to hospital.    Vicodin [Hydrocodone-Acetaminophen]      Itching      Codeine Rash     itching    Tylenol [Acetaminophen] Rash     Itching, pt states he is able to take       Medications     Current Outpatient Medications   Medication Sig Dispense Refill    loratadine (CLARITIN) 10 MG tablet Take 1 tablet by mouth daily 30 tablet 2    sildenafil (VIAGRA) 100 MG tablet Take 1 tablet by mouth daily as needed for Erectile Dysfunction 30 tablet 1    tiotropium (SPIRIVA RESPIMAT) 2.5 MCG/ACT AERS inhaler Inhale 2 puffs into the lungs daily 1 each 0    ALPRAZolam (XANAX) 1 MG tablet TAKE 1 TABLET BY MOUTH THREE TIMES DAILY 90 tablet 0    sodium chloride (OCEAN) 0.65 % nasal spray 2 sprays by Nasal route 4 times daily 1 each 3    fluticasone (FLONASE) 50 MCG/ACT nasal spray 2 sprays by Each Nostril route daily 48 g 1    hydroCHLOROthiazide (HYDRODIURIL) 12.5 MG tablet Take 1 tablet by mouth daily 30 tablet 3    venlafaxine (EFFEXOR XR) 75 MG extended release capsule TAKE 1 CAPSULE BY MOUTH ONE TIME A DAY 90 capsule 1    atorvastatin (LIPITOR) 10 MG tablet TAKE 1 TABLET BY MOUTH EVERY DAY 90 tablet 2    amLODIPine (NORVASC) 5 MG tablet TAKE 1 TABLET BY MOUTH EVERY DAY 90 tablet 2     No current facility-administered medications for this visit.        Review of Systems     REVIEW OF SYSTEM: See HPI above    PhysicalExam     Vitals:    12/29/22 1255   BP: (!) 147/78   Pulse: 85   Temp: 99.1 °F (37.3 °C)   TempSrc: Temporal   SpO2: 97%   Weight: 211 lb (95.7 kg)       PHYSICAL EXAM  BP (!) 147/78   Pulse 85   Temp 99.1 °F (37.3 °C) (Temporal)   Wt 211 lb (95.7 kg) SpO2 97%   BMI 30.28 kg/m²     GENERAL: No acute distress, alert and oriented. EYES: EOMI, Anti-icteric. NOSE: On anterior rhinoscopy there is no epistaxis, nasal mucosa moist and normal appearing, no purulent drainage. EARS: Normal external appearance; on portable otomicroscopy:     -Ad: External auditory canal without stenosis, tympanic membrane with PE tube in posterior inferior quadrant, PE tube looks in place and patent. Pneumatic otoscopy demonstrates immobile TM. No otorrhea.      -As: External auditory canal without stenosis, tympanic membrane with PE tube in posterior inferior quadrant, PE tube looks in place and patent. Pneumatic otoscopy demonstrates immobile TM. No otorrhea. FACE: HB 1/6 bilaterally, symmetric appearing, sensation equal bilaterally  ORAL CAVITY: No masses or lesions visualized or palpated, uvula is midline, moist mucous membranes, no oropharyngeal masses or oropharyngeal obstruction  NECK: Normal range of motion, no thyromegaly, trachea is midline, no palpable lymphadenopathy or neck masses, no crepitus  CHEST: Normal respiratory effort, breathing comfortably, no retractions  SKIN: No rashes, normal appearing skin, no evidence of skin lesions/tumors  NEURO: Cranial Nerves 2, 3, 4, 5, 6, 7, 11, 12 grossly intact bilaterally     I have performed a head and neck physical exam personally or was physically present during the key or critical portions of the service. Assessment and Plan     1. Dysfunction of both eustachian tubes  -Bilateral PE tubes in place. No longer with otorrhea since finishing his Ciprodex drops. Discussed using Afrin and Sudafed prior to his flight to the [facility]. 2. Acute serous otitis media of left ear, recurrence not specified  -Resolved with Ciprodex. Avoid placement of hydroperoxide into the ear now that there is an ear tube in place. 3. Allergic rhinitis, unspecified seasonality, unspecified trigger  4.  PND (post-nasal drip)  -Continue Flonase daily. Increase use of sinonasal saline sprays to at least 3 times a day  - Start Claritin daily    Follow-Up     Return if symptoms worsen or fail to improve. Karel Alvarez   Department of Otolaryngology/Head and Neck Surgery  12/29/22    Medical Decision Making: The following items were considered in medical decision making:  Independent review of images  Review / order clinical lab tests  Review / order radiology tests  Decision to obtain old records      This note was generated completely or in part utilizing Dragon dictation speech recognition software. Occasionally, words are mistranscribed and despite editing, the text may contain inaccuracies due to incorrect word recognition. If further clarification is needed please contact the office at [phone].

## 2022-12-30 RX ORDER — FLUTICASONE PROPIONATE 50 MCG
2 SPRAY, SUSPENSION (ML) NASAL DAILY
Qty: 48 G | Refills: 1 | Status: SHIPPED | OUTPATIENT
Start: 2022-12-30

## 2022-12-30 RX ORDER — ECHINACEA PURPUREA EXTRACT 125 MG
2 TABLET ORAL 4 TIMES DAILY
Qty: 1 EACH | Refills: 3 | Status: SHIPPED | OUTPATIENT
Start: 2022-12-30

## 2022-12-30 NOTE — TELEPHONE ENCOUNTER
Problem: Cardiac Rhythm Disturbances with or without Devices  Goal: Hemodynamic stability achieved/maintained  Outcome: Outcome Met, Continue evaluating goal progress toward completion  Goal: Anxiety is controlled  Outcome: Outcome Met, Continue evaluating goal progress toward completion  Goal: Participates in ADL/Activity without s/s of intolerance  Outcome: Outcome Met, Continue evaluating goal progress toward completion  Goal: Urinary elimination pattern returned to baseline  Description: Patient must be making adequate amounts of urine output (240cc/8 hours) and voiding. If indwelling urinary catheter: Remove asap (no later an Day 2 or provider must specify reason for continued use).  Outcome: Outcome Met, Continue evaluating goal progress toward completion  Goal: Verbalizes understanding of rhythm disturbance, treatment procedure and pre, post-, and d/c care specific to intervention  Description: Document on Patient Education Activity  Outcome: Outcome Met, Continue evaluating goal progress toward completion     Problem: Pain  Goal: #Acceptable pain level achieved/maintained at rest using NRS/Faces  Description: This goal is used for patients who can self-report.  Acceptable means the level is at or below the identified comfort/function goal.  Outcome: Outcome Met, Continue evaluating goal progress toward completion  Goal: # Acceptable pain level achieved/maintained with activity using NRS/Faces  Description: This goal is used for patients who can self-report and are not achieving acceptable pain control during activity.  Outcome: Outcome Met, Continue evaluating goal progress toward completion  Goal: # Verbalizes understanding of pain management  Description: Documented in Patient Education Activity  Outcome: Outcome Met, Continue evaluating goal progress toward completion      Refill request. LOV 12/29/2022. Please advise. Thank you.

## 2022-12-30 NOTE — TELEPHONE ENCOUNTER
From: Jumana Alcantara  To: Dr. Israel Rausch: 12/29/2022 10:09 PM EST  Subject: Flonase and saline nasal spray    I have one bottle of each of these. I will be going to [facility] friday the 30th to  I believe claritin that you called in I will check while Im there if there are refills available for either of those. I dont think there is. Main thing I suppose would be flonase as I can get saline over the counter. Possible in that climate I might not need anything but just in case.

## 2023-01-08 ENCOUNTER — PATIENT MESSAGE (OUTPATIENT)
Dept: FAMILY MEDICINE CLINIC | Age: 63
End: 2023-01-08

## 2023-01-08 DIAGNOSIS — G89.29 CHRONIC LEFT-SIDED LOW BACK PAIN WITH LEFT-SIDED SCIATICA: ICD-10-CM

## 2023-01-08 DIAGNOSIS — M54.42 CHRONIC LEFT-SIDED LOW BACK PAIN WITH LEFT-SIDED SCIATICA: ICD-10-CM

## 2023-01-09 RX ORDER — OXYCODONE AND ACETAMINOPHEN 7.5; 325 MG/1; MG/1
1 TABLET ORAL EVERY 6 HOURS PRN
Qty: 120 TABLET | Refills: 0 | Status: SHIPPED | OUTPATIENT
Start: 2023-01-09 | End: 2023-02-08

## 2023-01-09 NOTE — TELEPHONE ENCOUNTER
Medication:   Requested Prescriptions     Pending Prescriptions Disp Refills    oxyCODONE-acetaminophen (PERCOCET) 7.5-325 MG per tablet 120 tablet 0     Sig: Take 1 tablet by mouth every 6 hours as needed for Pain for up to 30 days. Intended supply: 30 days        Last Filled:  11/14/2022, 120, 0    Patient Phone Number: [phone] (home)     Last appt: 12/19/2022   Next appt: 2/28/2023    Last OARRS:   RX Monitoring 12/19/2022   Attestation -   Periodic Controlled Substance Monitoring No signs of potential drug abuse or diversion identified.

## 2023-01-09 NOTE — TELEPHONE ENCOUNTER
From: Gerson Ramos  To: Dr. Master Murphy: 1/8/2023 9:50 PM EST  Subject: Percocet    As you know there was a problem with Dr. Romero Speaker we talked about. Know there seems to be another issue as he is not communicating with [facility]. There requesting from him about my first visit, they have contacted him on three occasions and he doesnt seem to be complying with what they are asking from. He has filled percocet up till 1-9. As you know Im leaving the country on 1-11 till 2-23. [facility] has contacted me with issues of paying him for visits. Im not sure what is going on but he is supposed to and said he will fill my perscription on 1-9. As we talked about you would take over when I returned but dont believe I will be seeing him based on [facility] not paying for visits since he is not send them what they are asking for so Im a bit concerned so I would like to ask that you send in for my refill so I will for sure get my medicine before I leave on 1-11. Percocet 7.5/325 120 4 times a day every six hours. Your aware that if he would call them in as well as you the pharmacy will only fill   one. Since I dont know what is going on with Dr. Heather Grossman and [facility] just making sure I will have my meds for my trip.  I will call your office Monday afternoon to make sure or confirm the situation    Marcelo Brothers  [phone] Please dont hesitate to call

## 2023-03-11 DIAGNOSIS — F33.2 SEVERE EPISODE OF RECURRENT MAJOR DEPRESSIVE DISORDER, WITHOUT PSYCHOTIC FEATURES (HCC): ICD-10-CM

## 2023-03-13 RX ORDER — VENLAFAXINE HYDROCHLORIDE 75 MG/1
CAPSULE, EXTENDED RELEASE ORAL
Qty: 90 CAPSULE | Refills: 0 | Status: SHIPPED | OUTPATIENT
Start: 2023-03-13

## 2023-03-13 NOTE — TELEPHONE ENCOUNTER
Medication:   Requested Prescriptions     Pending Prescriptions Disp Refills    venlafaxine (EFFEXOR XR) 75 MG extended release capsule [Pharmacy Med Name: Venlafaxine HCl ER Oral Capsule Extended Release 24 Hour 75 MG] 90 capsule 0     Sig: TAKE 1 CAPSULE BY MOUTH ONE TIME A DAY        Last Filled:  10/31/2022, 90, 1    Patient Phone Number: [phone] (home)     Last appt: 12/19/2022   Next appt: 4/4/2023    Last OARRS:   RX Monitoring 12/19/2022   Attestation -   Periodic Controlled Substance Monitoring No signs of potential drug abuse or diversion identified.

## 2023-04-13 PROBLEM — F10.920 ACUTE ALCOHOLIC INTOXICATION WITHOUT COMPLICATION (HCC): Status: RESOLVED | Noted: 2023-04-13 | Resolved: 2023-04-13

## 2023-04-13 PROBLEM — F10.920 ACUTE ALCOHOLIC INTOXICATION WITHOUT COMPLICATION (HCC): Status: ACTIVE | Noted: 2023-04-13

## 2023-04-20 DIAGNOSIS — Z12.5 PROSTATE CANCER SCREENING: ICD-10-CM

## 2023-04-20 DIAGNOSIS — I10 ESSENTIAL HYPERTENSION: ICD-10-CM

## 2023-04-20 LAB
ALBUMIN SERPL-MCNC: 4.4 G/DL (ref 3.4–5)
ALBUMIN/GLOB SERPL: 1.9 {RATIO} (ref 1.1–2.2)
ALP SERPL-CCNC: 65 U/L (ref 40–129)
ALT SERPL-CCNC: 17 U/L (ref 10–40)
ANION GAP SERPL CALCULATED.3IONS-SCNC: 9 MMOL/L (ref 3–16)
AST SERPL-CCNC: 16 U/L (ref 15–37)
BASOPHILS # BLD: 0.1 K/UL (ref 0–0.2)
BASOPHILS NFR BLD: 0.6 %
BILIRUB SERPL-MCNC: 0.8 MG/DL (ref 0–1)
BUN SERPL-MCNC: 12 MG/DL (ref 7–20)
CALCIUM SERPL-MCNC: 9.7 MG/DL (ref 8.3–10.6)
CHLORIDE SERPL-SCNC: 108 MMOL/L (ref 99–110)
CHOLEST SERPL-MCNC: 180 MG/DL (ref 0–199)
CO2 SERPL-SCNC: 28 MMOL/L (ref 21–32)
CREAT SERPL-MCNC: 0.6 MG/DL (ref 0.8–1.3)
DEPRECATED RDW RBC AUTO: 14.7 % (ref 12.4–15.4)
EOSINOPHIL # BLD: 0.1 K/UL (ref 0–0.6)
EOSINOPHIL NFR BLD: 1 %
GFR SERPLBLD CREATININE-BSD FMLA CKD-EPI: >60 ML/MIN/{1.73_M2}
GLUCOSE SERPL-MCNC: 102 MG/DL (ref 70–99)
HCT VFR BLD AUTO: 45.1 % (ref 40.5–52.5)
HDLC SERPL-MCNC: 45 MG/DL (ref 40–60)
HGB BLD-MCNC: 15.2 G/DL (ref 13.5–17.5)
LDLC SERPL CALC-MCNC: 111 MG/DL
LYMPHOCYTES # BLD: 1.6 K/UL (ref 1–5.1)
LYMPHOCYTES NFR BLD: 16.9 %
MCH RBC QN AUTO: 30.6 PG (ref 26–34)
MCHC RBC AUTO-ENTMCNC: 33.6 G/DL (ref 31–36)
MCV RBC AUTO: 91.1 FL (ref 80–100)
MONOCYTES # BLD: 0.5 K/UL (ref 0–1.3)
MONOCYTES NFR BLD: 5.3 %
NEUTROPHILS # BLD: 7.2 K/UL (ref 1.7–7.7)
NEUTROPHILS NFR BLD: 76.2 %
PLATELET # BLD AUTO: 275 K/UL (ref 135–450)
PMV BLD AUTO: 8.3 FL (ref 5–10.5)
POTASSIUM SERPL-SCNC: 4.7 MMOL/L (ref 3.5–5.1)
PROT SERPL-MCNC: 6.7 G/DL (ref 6.4–8.2)
PSA SERPL DL<=0.01 NG/ML-MCNC: 0.9 NG/ML (ref 0–4)
RBC # BLD AUTO: 4.96 M/UL (ref 4.2–5.9)
SODIUM SERPL-SCNC: 145 MMOL/L (ref 136–145)
TRIGL SERPL-MCNC: 118 MG/DL (ref 0–150)
TSH SERPL DL<=0.005 MIU/L-ACNC: 0.97 UIU/ML (ref 0.27–4.2)
VLDLC SERPL CALC-MCNC: 24 MG/DL
WBC # BLD AUTO: 9.4 K/UL (ref 4–11)

## 2023-05-18 ENCOUNTER — OFFICE VISIT (OUTPATIENT)
Dept: FAMILY MEDICINE CLINIC | Age: 63
End: 2023-05-18

## 2023-05-18 VITALS
HEIGHT: 70 IN | SYSTOLIC BLOOD PRESSURE: 136 MMHG | WEIGHT: 207 LBS | DIASTOLIC BLOOD PRESSURE: 82 MMHG | BODY MASS INDEX: 29.63 KG/M2

## 2023-05-18 DIAGNOSIS — M54.42 CHRONIC LEFT-SIDED LOW BACK PAIN WITH LEFT-SIDED SCIATICA: Primary | ICD-10-CM

## 2023-05-18 DIAGNOSIS — G89.29 CHRONIC LEFT-SIDED LOW BACK PAIN WITH LEFT-SIDED SCIATICA: Primary | ICD-10-CM

## 2023-05-18 DIAGNOSIS — F41.9 ANXIETY: ICD-10-CM

## 2023-05-18 DIAGNOSIS — F33.2 SEVERE EPISODE OF RECURRENT MAJOR DEPRESSIVE DISORDER, WITHOUT PSYCHOTIC FEATURES (HCC): ICD-10-CM

## 2023-05-18 RX ORDER — OXYCODONE AND ACETAMINOPHEN 7.5; 325 MG/1; MG/1
1 TABLET ORAL EVERY 6 HOURS PRN
Qty: 120 TABLET | Refills: 0 | Status: SHIPPED | OUTPATIENT
Start: 2023-05-18 | End: 2023-06-17

## 2023-05-18 ASSESSMENT — ENCOUNTER SYMPTOMS
RESPIRATORY NEGATIVE: 1
BACK PAIN: 1
GASTROINTESTINAL NEGATIVE: 1

## 2023-05-18 NOTE — PROGRESS NOTES
Lula Romo (:  1960) is a 63 y.o. male,Established patient, here for evaluation of the following chief complaint(s):  Back Pain         ASSESSMENT/PLAN:  Assessment/Plan:    Frank Toribio was seen today for back pain. Diagnoses and all orders for this visit:    Chronic left-sided low back pain with left-sided sciatica  -     oxyCODONE-acetaminophen (PERCOCET) 7.5-325 MG per tablet; Take 1 tablet by mouth every 6 hours as needed for Pain for up to 30 days. Intended supply: 30 days  -     Alisson Zaman MD, Neurosurgery (Spine), [address] [place] [address], MD [place], Pain Management, [facility]  OARRS report reviewed and no inconsistencies noted   The patient understands the risks of dependency/addiction with oxycodone and will take as little as possible and discontinue as soon as possible   Severe episode of recurrent major depressive disorder, without psychotic features [facility])  Patient has been doing better regarding his depression to the point that he does not feel he needs medication. Anxiety  Patient has weaned himself off the alprazolam and although he still notes some degree of anxiety feels he can get by without medication        Return in about 3 months (around 2023). Subjective   SUBJECTIVE/OBJECTIVE:  HPI  Anxiety/Back Pain/OARRS visit and med check. He continues to need med for anxiety and sleep and back pain. Chronic left-sided low back pain with left-sided sciatica  Persists with chronic/acute back pain due to disc et al, he has radicular pain and numbness. He has been averse to surgery again but thinks he may need to have it done in the future. He remains on oxycodone and tried medical marijuana without help. He doesn't want to go back to see Dr Joseph Weldon because he did not feel he did much  Depression:  He states he feels a little depressed due to wife's health but not enough to take medication.  He stopped his venlafaxine  Anxiety:  The patient

## 2023-05-22 DIAGNOSIS — M54.42 CHRONIC LEFT-SIDED LOW BACK PAIN WITH LEFT-SIDED SCIATICA: ICD-10-CM

## 2023-05-22 DIAGNOSIS — G89.29 CHRONIC LEFT-SIDED LOW BACK PAIN WITH LEFT-SIDED SCIATICA: ICD-10-CM

## 2023-05-22 RX ORDER — OXYCODONE AND ACETAMINOPHEN 7.5; 325 MG/1; MG/1
1 TABLET ORAL EVERY 6 HOURS PRN
Qty: 120 TABLET | Refills: 0 | Status: SHIPPED | OUTPATIENT
Start: 2023-05-22 | End: 2023-05-26

## 2023-05-23 ENCOUNTER — OFFICE VISIT (OUTPATIENT)
Dept: PULMONOLOGY | Age: 63
End: 2023-05-23
Payer: MEDICARE

## 2023-05-23 VITALS
WEIGHT: 213 LBS | HEIGHT: 70 IN | DIASTOLIC BLOOD PRESSURE: 70 MMHG | OXYGEN SATURATION: 97 % | BODY MASS INDEX: 30.49 KG/M2 | SYSTOLIC BLOOD PRESSURE: 130 MMHG | HEART RATE: 66 BPM

## 2023-05-23 DIAGNOSIS — J44.9 COPD, MODERATE (HCC): Primary | ICD-10-CM

## 2023-05-23 PROCEDURE — 3075F SYST BP GE 130 - 139MM HG: CPT | Performed by: INTERNAL MEDICINE

## 2023-05-23 PROCEDURE — G8417 CALC BMI ABV UP PARAM F/U: HCPCS | Performed by: INTERNAL MEDICINE

## 2023-05-23 PROCEDURE — 99203 OFFICE O/P NEW LOW 30 MIN: CPT | Performed by: INTERNAL MEDICINE

## 2023-05-23 PROCEDURE — G8427 DOCREV CUR MEDS BY ELIG CLIN: HCPCS | Performed by: INTERNAL MEDICINE

## 2023-05-23 PROCEDURE — 3078F DIAST BP <80 MM HG: CPT | Performed by: INTERNAL MEDICINE

## 2023-05-23 PROCEDURE — 3023F SPIROM DOC REV: CPT | Performed by: INTERNAL MEDICINE

## 2023-05-23 RX ORDER — ALBUTEROL SULFATE 90 UG/1
2 AEROSOL, METERED RESPIRATORY (INHALATION) 4 TIMES DAILY PRN
Qty: 18 G | Refills: 5 | Status: SHIPPED | OUTPATIENT
Start: 2023-05-23

## 2023-05-23 ASSESSMENT — ENCOUNTER SYMPTOMS
SINUS PRESSURE: 0
CHEST TIGHTNESS: 0
CONSTIPATION: 0
ABDOMINAL DISTENTION: 0
RHINORRHEA: 0
BLOOD IN STOOL: 0
WHEEZING: 0
VOICE CHANGE: 0
SHORTNESS OF BREATH: 1
ANAL BLEEDING: 0
STRIDOR: 0
COUGH: 1
ABDOMINAL PAIN: 0
DIARRHEA: 0
SORE THROAT: 0
APNEA: 0
BACK PAIN: 0
CHOKING: 0

## 2023-05-23 NOTE — PROGRESS NOTES
Loreta Rasheed    YOB: 1960     Date of Service:  5/23/2023     Chief Complaint   Patient presents with    New Patient     REF BY DR Jonah Johnson FOR EMPHYSEMA    Shortness of Breath     WITH ACTIVITY      Cough     PRODUCTIVE WHITE/YELLOW SPUTUM         HPI patient referred for consultation by Dr. Davin Williamson for evaluation of COPD. Patient states that he has a chronic cough with mucus and chest tightness with any form of activity including short distances. This has been ongoing for several years, but lately it appears to have progressed significantly. Patient states that he was previously prescribed Anoro Ellipta in 2019, but he stopped using it due to high cost.  Currently he is not on any inhalers. Prior PFT from 3/2019 showed moderate obstruction with FEV1 of 2.13 L [57% predicted]. Current smoker, still smokes > 1 pack/day for 45 years. He states that he has previously tried nicotine patches and Chantix and failed quitting the habit. Patient's mother had lung cancer. He is a retired , also worked as a hazmat worker in the remote past.    [address] [Tapentadol] Anaphylaxis     Hallucinations. Went to hospital.    Vicodin [Hydrocodone-Acetaminophen]      Itching      Codeine Rash     itching    Tylenol [Acetaminophen] Rash     Itching, pt states he is able to take     Outpatient Medications Marked as Taking for the 5/23/23 encounter (Office Visit) with Laron Romo MD   Medication Sig Dispense Refill    Budeson-Glycopyrrol-Formoterol 160-9-4.8 MCG/ACT AERO Inhale 2 puffs into the lungs in the morning and at bedtime 1 each 3    albuterol sulfate HFA (VENTOLIN HFA) 108 (90 Base) MCG/ACT inhaler Inhale 2 puffs into the lungs 4 times daily as needed for Wheezing 18 g 5    oxyCODONE-acetaminophen (PERCOCET) 7.5-325 MG per tablet Take 1 tablet by mouth every 6 hours as needed for Pain for up to 30 days.  Intended supply: 30 days 120 tablet 0

## 2023-05-25 ENCOUNTER — TELEPHONE (OUTPATIENT)
Dept: ENT CLINIC | Age: 63
End: 2023-05-25

## 2023-05-26 ENCOUNTER — PATIENT MESSAGE (OUTPATIENT)
Dept: FAMILY MEDICINE CLINIC | Age: 63
End: 2023-05-26

## 2023-05-26 ENCOUNTER — OFFICE VISIT (OUTPATIENT)
Dept: FAMILY MEDICINE CLINIC | Age: 63
End: 2023-05-26

## 2023-05-26 VITALS
SYSTOLIC BLOOD PRESSURE: 132 MMHG | BODY MASS INDEX: 30.49 KG/M2 | DIASTOLIC BLOOD PRESSURE: 66 MMHG | HEIGHT: 70 IN | WEIGHT: 213 LBS

## 2023-05-26 DIAGNOSIS — H92.02 LEFT EAR PAIN: Primary | ICD-10-CM

## 2023-05-26 DIAGNOSIS — M54.2 NECK PAIN: ICD-10-CM

## 2023-05-26 DIAGNOSIS — G89.29 CHRONIC LEFT-SIDED LOW BACK PAIN WITH LEFT-SIDED SCIATICA: ICD-10-CM

## 2023-05-26 DIAGNOSIS — M54.42 CHRONIC LEFT-SIDED LOW BACK PAIN WITH LEFT-SIDED SCIATICA: ICD-10-CM

## 2023-05-26 RX ORDER — OXYCODONE HYDROCHLORIDE 10 MG/1
10 TABLET ORAL EVERY 8 HOURS PRN
Qty: 90 TABLET | Refills: 0 | Status: SHIPPED | OUTPATIENT
Start: 2023-05-26 | End: 2023-06-25

## 2023-05-26 RX ORDER — TIZANIDINE 4 MG/1
4 TABLET ORAL 3 TIMES DAILY
Qty: 30 TABLET | Refills: 1 | Status: SHIPPED | OUTPATIENT
Start: 2023-05-26

## 2023-05-26 ASSESSMENT — ENCOUNTER SYMPTOMS
RESPIRATORY NEGATIVE: 1
BACK PAIN: 1
GASTROINTESTINAL NEGATIVE: 1

## 2023-05-26 NOTE — TELEPHONE ENCOUNTER
From: [facility]  To: Dr. Armando Busch: 5/26/2023 10:01 AM EDT  Subject: Left ear    After I saw you last week I started having a problem with my neck. Pain went down from the lower back part of ear and down neck into lower neck and when I would cough pain would shoot across the bottom of neck like a thump. Got bad last few days a little better now. Still pressure and heavy feeling like maybe swelling now. Pain has decreased as worst part is over I hope. Ears not clogged with like fluid. But like something down in there. Im sure its my ear and not neck now. Tried getting in with ENT Dr. Jomarie Hashimoto but cant get me in until 6-20 they suggested I see PCP. I dont want to have to come in unless I have to hoping maybe antibiotics or whatever you think for now. Would you be able to call something in.     Brittany Perez  [phone]

## 2023-05-26 NOTE — PROGRESS NOTES
Cristela Warner (:  1960) is a 63 y.o. male,Established patient, here for evaluation of the following chief complaint(s):  Otalgia         ASSESSMENT/PLAN:  1. Left ear pain/2. Neck pain  Trial of  -     tiZANidine (ZANAFLEX) 4 MG tablet; Take 1 tablet by mouth 3 times daily, Disp-30 tablet, R-1Normal    3. Chronic left-sided low back pain with left-sided sciatica  -     oxyCODONE HCl (OXY-IR) 10 MG immediate release tablet; Take 1 tablet by mouth every 8 hours as needed for Pain for up to 30 days. Intended supply: 30 days Max Daily Amount: 30 mg, Disp-90 tablet, R-0Print  See note below about changing his oxycodone dose. Since I will be on vacation when his new dose is due, I have given him a written prescription to be filled in 1 month. I will remove the Percocet 7.5 so there is no confusion and what his next dose will be. He is aware he will not be able to fill this for 3 days from the last scription. Return in about 3 months (around 2023), or if symptoms worsen or fail to improve. Subjective   SUBJECTIVE/OBJECTIVE:  HPI  Patient complains of left ear pain for the last several days. He has had PE tubes in his ears. He states he tried to get into see Dr. Jomarie Hashimoto but could not get in until the end of . He has had no fever or other systemic symptoms. He also states he has some discomfort from behind the ear down the sternocleidomastoid muscle as well as on the right side back of the neck. Patient also states he would like to eliminate the acetaminophen in his Percocet and take oxycodone. Since they do not make a 7.5 mg oxycodone he would like to increase to the 10 mg however only take it 3 times per day rather than 4 times per day that he is taking now and the oxycodone dose within the equal at 30 mg/day. Review of Systems   HENT:  Positive for ear pain. Respiratory: Negative. Cardiovascular: Negative. Gastrointestinal: Negative. Endocrine: Negative.

## 2023-05-31 NOTE — TELEPHONE ENCOUNTER
Going to open up my schedule this for this Friday early afternoon at [address] for add-ons.   Can you please call the patient to schedule

## 2023-06-02 ENCOUNTER — OFFICE VISIT (OUTPATIENT)
Dept: ENT CLINIC | Age: 63
End: 2023-06-02
Payer: MEDICARE

## 2023-06-02 VITALS
BODY MASS INDEX: 29.78 KG/M2 | OXYGEN SATURATION: 91 % | HEART RATE: 83 BPM | WEIGHT: 208 LBS | HEIGHT: 70 IN | SYSTOLIC BLOOD PRESSURE: 137 MMHG | DIASTOLIC BLOOD PRESSURE: 82 MMHG | TEMPERATURE: 97.8 F

## 2023-06-02 DIAGNOSIS — H69.83 DYSFUNCTION OF BOTH EUSTACHIAN TUBES: ICD-10-CM

## 2023-06-02 DIAGNOSIS — I10 ESSENTIAL HYPERTENSION: ICD-10-CM

## 2023-06-02 DIAGNOSIS — M26.622 ARTHRALGIA OF LEFT TEMPOROMANDIBULAR JOINT: Primary | ICD-10-CM

## 2023-06-02 PROCEDURE — 3075F SYST BP GE 130 - 139MM HG: CPT | Performed by: STUDENT IN AN ORGANIZED HEALTH CARE EDUCATION/TRAINING PROGRAM

## 2023-06-02 PROCEDURE — 3017F COLORECTAL CA SCREEN DOC REV: CPT | Performed by: STUDENT IN AN ORGANIZED HEALTH CARE EDUCATION/TRAINING PROGRAM

## 2023-06-02 PROCEDURE — 4004F PT TOBACCO SCREEN RCVD TLK: CPT | Performed by: STUDENT IN AN ORGANIZED HEALTH CARE EDUCATION/TRAINING PROGRAM

## 2023-06-02 PROCEDURE — G8427 DOCREV CUR MEDS BY ELIG CLIN: HCPCS | Performed by: STUDENT IN AN ORGANIZED HEALTH CARE EDUCATION/TRAINING PROGRAM

## 2023-06-02 PROCEDURE — 99213 OFFICE O/P EST LOW 20 MIN: CPT | Performed by: STUDENT IN AN ORGANIZED HEALTH CARE EDUCATION/TRAINING PROGRAM

## 2023-06-02 PROCEDURE — G8417 CALC BMI ABV UP PARAM F/U: HCPCS | Performed by: STUDENT IN AN ORGANIZED HEALTH CARE EDUCATION/TRAINING PROGRAM

## 2023-06-02 PROCEDURE — 3079F DIAST BP 80-89 MM HG: CPT | Performed by: STUDENT IN AN ORGANIZED HEALTH CARE EDUCATION/TRAINING PROGRAM

## 2023-06-02 NOTE — PROGRESS NOTES
[address]  FOLLOW-UP VISIT      Patient Name: Gurmeet Park Record Number:  [id number]  Primary Care Physician:  Bharti Mora MD    ChiefComplaint     Chief Complaint   Patient presents with    Ear Problem     Pt c/o left ear pain       History of Present Illness     Gokul Campa is an 63 y.o. male previously seen for chronic serous otitis media of left ear status post PE tube placement on 2022    Interval History:   2 weeks ago was having pain behind his ear. Was tender to the touch. Better now. Seen by PCP who thought it may be related to musculoskeletal neck pain and prescribed him Zanaflex. Seems like the beginning of fluid buildup but ear not clogged off and no hearing changes. Did not take any medications for this.      Past Medical History     Past Medical History:   Diagnosis Date    Alcoholism ([address] 1999    Anxiety     Back pain     Hyperlipidemia     Low HDL (under 40)     Prolonged emergence from general anesthesia     \"hard time breathing when waking up'    Spinal arthritis        Past Surgical History     Past Surgical History:   Procedure Laterality Date    COLONOSCOPY  10/18/2012    COLONOSCOPY N/A 2021    Dr Bello Needles[de-identified] 2 polyps in cecum 10 mm polyp mid sigmoid, mild diverticulosis    SPINAL FUSION  10/2006    SPINE SURGERY      l5-s1 hardware removal    TYMPANOSTOMY TUBE PLACEMENT Bilateral 2018    LEFT PRESSURE EQUALIZATION TUBE INSERTION AND EXAM UNDER ANESTHESIA OF NASOPHARYNX    VASECTOMY  2019       Family History     Family History   Problem Relation Age of Onset    Heart Disease Mother     High Blood Pressure Mother     Cancer Mother         female    Lung Cancer Mother 78        stage 4, age 78 after 2-6 months, 3/2021       Social History     Social History     Tobacco Use    Smoking status: Every Day     Packs/day: 1.50     Years: 43.00     Pack years: 64.50     Types:

## 2023-06-05 RX ORDER — HYDROCHLOROTHIAZIDE 12.5 MG/1
12.5 TABLET ORAL DAILY
Qty: 90 TABLET | Refills: 3 | Status: SHIPPED | OUTPATIENT
Start: 2023-06-05

## 2023-06-05 NOTE — TELEPHONE ENCOUNTER
Medication:   Requested Prescriptions     Pending Prescriptions Disp Refills    hydroCHLOROthiazide (HYDRODIURIL) 12.5 MG tablet [Pharmacy Med Name: hydroCHLOROthiazide Oral Tablet 12.5 MG] 30 tablet 0     Sig: Take 1 tablet by mouth daily        Last Filled:  11/21/2022    Patient Phone Number: [phone] (home)     Last appt: 5/26/2023   Next appt: Visit date not found    Last OARRS:   RX Monitoring 5/18/2023   Attestation -   Periodic Controlled Substance Monitoring No signs of potential drug abuse or diversion identified.

## 2023-06-19 DIAGNOSIS — G89.29 CHRONIC LEFT-SIDED LOW BACK PAIN WITH LEFT-SIDED SCIATICA: ICD-10-CM

## 2023-06-19 DIAGNOSIS — M54.42 CHRONIC LEFT-SIDED LOW BACK PAIN WITH LEFT-SIDED SCIATICA: ICD-10-CM

## 2023-06-19 DIAGNOSIS — E78.5 HYPERLIPIDEMIA, UNSPECIFIED HYPERLIPIDEMIA TYPE: ICD-10-CM

## 2023-06-19 DIAGNOSIS — I10 ESSENTIAL HYPERTENSION: ICD-10-CM

## 2023-06-20 RX ORDER — ATORVASTATIN CALCIUM 10 MG/1
TABLET, FILM COATED ORAL
Qty: 90 TABLET | Refills: 0 | Status: SHIPPED | OUTPATIENT
Start: 2023-06-20

## 2023-06-20 RX ORDER — OXYCODONE HYDROCHLORIDE 10 MG/1
10 TABLET ORAL EVERY 8 HOURS PRN
Qty: 90 TABLET | Refills: 0 | Status: SHIPPED | OUTPATIENT
Start: 2023-06-20 | End: 2023-07-18 | Stop reason: SDUPTHER

## 2023-06-20 RX ORDER — AMLODIPINE BESYLATE 5 MG/1
TABLET ORAL
Qty: 90 TABLET | Refills: 0 | Status: SHIPPED | OUTPATIENT
Start: 2023-06-20

## 2023-06-20 NOTE — TELEPHONE ENCOUNTER
Lov 5/26/23  Lrf 90 0 5/26/23Medication:   Requested Prescriptions     Pending Prescriptions Disp Refills    oxyCODONE HCl (OXY-IR) 10 MG immediate release tablet 90 tablet 0     Sig: Take 1 tablet by mouth every 8 hours as needed for Pain for up to 30 days. Intended supply: 30 days Max Daily Amount: 30 mg        Last Filled:      Patient Phone Number: [phone] (home)     Last appt: 5/26/2023   Next appt: 6/19/2023    Last OARRS:   RX Monitoring 5/18/2023   Attestation -   Periodic Controlled Substance Monitoring No signs of potential drug abuse or diversion identified.

## 2023-06-26 DIAGNOSIS — M54.2 NECK PAIN: ICD-10-CM

## 2023-06-27 RX ORDER — TIZANIDINE 4 MG/1
4 TABLET ORAL 3 TIMES DAILY
Qty: 30 TABLET | Refills: 3 | Status: SHIPPED | OUTPATIENT
Start: 2023-06-27 | End: 2023-08-25

## 2023-07-12 ENCOUNTER — HOSPITAL ENCOUNTER (OUTPATIENT)
Dept: PULMONOLOGY | Age: 63
Discharge: HOME | End: 2023-07-12
Attending: INTERNAL MEDICINE
Payer: MEDICARE

## 2023-07-12 DIAGNOSIS — J44.9 COPD, MODERATE (HCC): ICD-10-CM

## 2023-07-12 PROCEDURE — 6370000000 HC RX 637 (ALT 250 FOR IP): Performed by: INTERNAL MEDICINE

## 2023-07-12 PROCEDURE — 94729 DIFFUSING CAPACITY: CPT

## 2023-07-12 PROCEDURE — 94726 PLETHYSMOGRAPHY LUNG VOLUMES: CPT

## 2023-07-12 PROCEDURE — 94760 N-INVAS EAR/PLS OXIMETRY 1: CPT

## 2023-07-12 PROCEDURE — 94060 EVALUATION OF WHEEZING: CPT

## 2023-07-12 RX ORDER — ALBUTEROL SULFATE 90 UG/1
4 AEROSOL, METERED RESPIRATORY (INHALATION) ONCE
Status: COMPLETED | OUTPATIENT
Start: 2023-07-12 | End: 2023-07-12

## 2023-07-12 RX ADMIN — Medication 4 PUFF: at 17:10

## 2023-07-13 PROCEDURE — 94729 DIFFUSING CAPACITY: CPT | Performed by: INTERNAL MEDICINE

## 2023-07-13 PROCEDURE — 94726 PLETHYSMOGRAPHY LUNG VOLUMES: CPT | Performed by: INTERNAL MEDICINE

## 2023-07-13 PROCEDURE — 94375 RESPIRATORY FLOW VOLUME LOOP: CPT | Performed by: INTERNAL MEDICINE

## 2023-07-13 NOTE — PROCEDURES
Pulmonary Function Testing      Patient name:  Gurjit Rodrigues     [facility] Unit #:   [id number]   Date of test: 7/12/2023  Date of interpretation:   7/13/2023    Mr. Gurjit Rodrigues is a 63y.o. year-old. The spirometry data were acceptable and reproducible. Spirometry:  Flow volume loops were obstructed. The FEV-1/FVC ratio was decreased. The  post-bronchodilator FEV-1 was 2.53 liters (70% of predicted), which was moderately decreased. The FVC was 3.62 liters (76% of predicted), which was decreased. Response to inhaled bronchodilators (albuterol) was significant. Lung volumes:  Lung volumes were tested by plethysmography. The total lung capacity was 7.53 liters (113% of predicted), which was normal. The residual volume was 3.67 liters (155% of predicted), which was increased. The ratio of residual volume to total lung capacity (RV/TLC) was 131%, which was increased. Specific airway resistance was increased. Diffusion capacity was found to be normal.       In comparison to the study done on 3/27/2019 there has been an improvement in FEV1 by 4%, total lung capacity by 23% with a decrease in FVC by 9% and diffusion capacity by 12%. Interpretation:  Moderate obstructive airway disease with a significant bronchodilator response, evidence of air trapping and normal diffusion capacity.       Abhi Bennett MD, [address] [place] [address] Pulmonary, Critical Care and Sleep Medicine  [address] [place] [address] [address]

## 2023-07-14 ENCOUNTER — TELEPHONE (OUTPATIENT)
Dept: FAMILY MEDICINE CLINIC | Age: 63
End: 2023-07-14

## 2023-07-18 ENCOUNTER — OFFICE VISIT (OUTPATIENT)
Dept: PULMONOLOGY | Age: 63
End: 2023-07-18
Payer: MEDICARE

## 2023-07-18 VITALS
BODY MASS INDEX: 29.35 KG/M2 | HEIGHT: 70 IN | DIASTOLIC BLOOD PRESSURE: 76 MMHG | WEIGHT: 205 LBS | SYSTOLIC BLOOD PRESSURE: 126 MMHG | HEART RATE: 78 BPM | OXYGEN SATURATION: 95 %

## 2023-07-18 DIAGNOSIS — M54.42 CHRONIC LEFT-SIDED LOW BACK PAIN WITH LEFT-SIDED SCIATICA: ICD-10-CM

## 2023-07-18 DIAGNOSIS — Z87.891 PERSONAL HISTORY OF TOBACCO USE: ICD-10-CM

## 2023-07-18 DIAGNOSIS — J44.9 COPD, MODERATE (HCC): Primary | ICD-10-CM

## 2023-07-18 DIAGNOSIS — G89.29 CHRONIC LEFT-SIDED LOW BACK PAIN WITH LEFT-SIDED SCIATICA: ICD-10-CM

## 2023-07-18 PROCEDURE — 99213 OFFICE O/P EST LOW 20 MIN: CPT | Performed by: INTERNAL MEDICINE

## 2023-07-18 PROCEDURE — G0296 VISIT TO DETERM LDCT ELIG: HCPCS | Performed by: INTERNAL MEDICINE

## 2023-07-18 PROCEDURE — 3017F COLORECTAL CA SCREEN DOC REV: CPT | Performed by: INTERNAL MEDICINE

## 2023-07-18 PROCEDURE — 3078F DIAST BP <80 MM HG: CPT | Performed by: INTERNAL MEDICINE

## 2023-07-18 PROCEDURE — G8417 CALC BMI ABV UP PARAM F/U: HCPCS | Performed by: INTERNAL MEDICINE

## 2023-07-18 PROCEDURE — 4004F PT TOBACCO SCREEN RCVD TLK: CPT | Performed by: INTERNAL MEDICINE

## 2023-07-18 PROCEDURE — 3023F SPIROM DOC REV: CPT | Performed by: INTERNAL MEDICINE

## 2023-07-18 PROCEDURE — 3074F SYST BP LT 130 MM HG: CPT | Performed by: INTERNAL MEDICINE

## 2023-07-18 PROCEDURE — G8427 DOCREV CUR MEDS BY ELIG CLIN: HCPCS | Performed by: INTERNAL MEDICINE

## 2023-07-18 RX ORDER — OXYCODONE HYDROCHLORIDE 10 MG/1
10 TABLET ORAL EVERY 8 HOURS PRN
Qty: 90 TABLET | Refills: 0 | Status: SHIPPED | OUTPATIENT
Start: 2023-07-18 | End: 2023-08-17

## 2023-07-18 ASSESSMENT — ENCOUNTER SYMPTOMS
ANAL BLEEDING: 0
BLOOD IN STOOL: 0
SINUS PRESSURE: 0
DIARRHEA: 0
CONSTIPATION: 0
WHEEZING: 0
VOICE CHANGE: 0
CHEST TIGHTNESS: 0
CHOKING: 0
BACK PAIN: 0
SORE THROAT: 0
RHINORRHEA: 0
ABDOMINAL DISTENTION: 0
STRIDOR: 0
SHORTNESS OF BREATH: 1
COUGH: 1
ABDOMINAL PAIN: 0
APNEA: 0

## 2023-07-20 RX ORDER — ALBUTEROL SULFATE 90 UG/1
2 AEROSOL, METERED RESPIRATORY (INHALATION) 4 TIMES DAILY PRN
Qty: 18 G | Refills: 5 | Status: SHIPPED | OUTPATIENT
Start: 2023-07-20

## 2023-08-14 DIAGNOSIS — M54.42 CHRONIC LEFT-SIDED LOW BACK PAIN WITH LEFT-SIDED SCIATICA: ICD-10-CM

## 2023-08-14 DIAGNOSIS — G89.29 CHRONIC LEFT-SIDED LOW BACK PAIN WITH LEFT-SIDED SCIATICA: ICD-10-CM

## 2023-08-14 RX ORDER — OXYCODONE HYDROCHLORIDE 10 MG/1
10 TABLET ORAL EVERY 8 HOURS PRN
Qty: 90 TABLET | Refills: 0 | Status: SHIPPED | OUTPATIENT
Start: 2023-08-14 | End: 2023-09-13

## 2023-08-14 NOTE — TELEPHONE ENCOUNTER
Lov 5/26/23  Lrf 90 0 7/18/23 Medication:   Requested Prescriptions     Pending Prescriptions Disp Refills    oxyCODONE HCl (OXY-IR) 10 MG immediate release tablet 90 tablet 0     Sig: Take 1 tablet by mouth every 8 hours as needed for Pain for up to 30 days. Intended supply: 30 days Max Daily Amount: 30 mg        Last Filled:      Patient Phone Number: [phone] (home)     Last appt: 5/26/2023   Next appt: Visit date not found    Last OARRS:   RX Monitoring 6/20/2023   Attestation -   Periodic Controlled Substance Monitoring No signs of potential drug abuse or diversion identified.

## 2023-08-22 ENCOUNTER — HOSPITAL ENCOUNTER (EMERGENCY)
Age: 63
Discharge: HOME | End: 2023-08-22
Attending: EMERGENCY MEDICINE
Payer: MEDICARE

## 2023-08-22 VITALS
OXYGEN SATURATION: 96 % | RESPIRATION RATE: 16 BRPM | HEART RATE: 82 BPM | DIASTOLIC BLOOD PRESSURE: 87 MMHG | TEMPERATURE: 98.1 F | SYSTOLIC BLOOD PRESSURE: 108 MMHG

## 2023-08-22 DIAGNOSIS — R55 VASOVAGAL SYNCOPE: Primary | ICD-10-CM

## 2023-08-22 LAB
ANION GAP SERPL CALCULATED.3IONS-SCNC: 9 MMOL/L (ref 3–16)
BASOPHILS # BLD: 0.2 K/UL (ref 0–0.2)
BASOPHILS NFR BLD: 1.3 %
BUN SERPL-MCNC: 13 MG/DL (ref 7–20)
CALCIUM SERPL-MCNC: 9.7 MG/DL (ref 8.3–10.6)
CHLORIDE SERPL-SCNC: 101 MMOL/L (ref 99–110)
CO2 SERPL-SCNC: 28 MMOL/L (ref 21–32)
CREAT SERPL-MCNC: 0.7 MG/DL (ref 0.8–1.3)
DEPRECATED RDW RBC AUTO: 14.4 % (ref 12.4–15.4)
EOSINOPHIL # BLD: 0.2 K/UL (ref 0–0.6)
EOSINOPHIL NFR BLD: 1.4 %
GFR SERPLBLD CREATININE-BSD FMLA CKD-EPI: >60 ML/MIN/{1.73_M2}
GLUCOSE SERPL-MCNC: 112 MG/DL (ref 70–99)
HCT VFR BLD AUTO: 43.6 % (ref 40.5–52.5)
HGB BLD-MCNC: 14.7 G/DL (ref 13.5–17.5)
LYMPHOCYTES # BLD: 3.7 K/UL (ref 1–5.1)
LYMPHOCYTES NFR BLD: 26.4 %
MCH RBC QN AUTO: 30.4 PG (ref 26–34)
MCHC RBC AUTO-ENTMCNC: 33.7 G/DL (ref 31–36)
MCV RBC AUTO: 90.3 FL (ref 80–100)
MONOCYTES # BLD: 0.9 K/UL (ref 0–1.3)
MONOCYTES NFR BLD: 6.4 %
NEUTROPHILS # BLD: 9.1 K/UL (ref 1.7–7.7)
NEUTROPHILS NFR BLD: 64.5 %
PLATELET # BLD AUTO: 296 K/UL (ref 135–450)
PMV BLD AUTO: 8.3 FL (ref 5–10.5)
POTASSIUM SERPL-SCNC: 3.6 MMOL/L (ref 3.5–5.1)
RBC # BLD AUTO: 4.83 M/UL (ref 4.2–5.9)
SODIUM SERPL-SCNC: 138 MMOL/L (ref 136–145)
TROPONIN, HIGH SENSITIVITY: 13 NG/L (ref 0–22)
WBC # BLD AUTO: 14.1 K/UL (ref 4–11)

## 2023-08-22 PROCEDURE — 85025 COMPLETE CBC W/AUTO DIFF WBC: CPT

## 2023-08-22 PROCEDURE — 80048 BASIC METABOLIC PNL TOTAL CA: CPT

## 2023-08-22 PROCEDURE — 84484 ASSAY OF TROPONIN QUANT: CPT

## 2023-08-22 PROCEDURE — 99284 EMERGENCY DEPT VISIT MOD MDM: CPT

## 2023-08-22 PROCEDURE — 93005 ELECTROCARDIOGRAM TRACING: CPT | Performed by: EMERGENCY MEDICINE

## 2023-08-22 ASSESSMENT — ENCOUNTER SYMPTOMS
COUGH: 0
NAUSEA: 0
BACK PAIN: 0
EYE REDNESS: 0
ABDOMINAL PAIN: 0
CONSTIPATION: 0
SHORTNESS OF BREATH: 0
DIARRHEA: 0
VOMITING: 0
EYE PAIN: 0
RHINORRHEA: 0

## 2023-08-22 ASSESSMENT — PAIN - FUNCTIONAL ASSESSMENT: PAIN_FUNCTIONAL_ASSESSMENT: NONE - DENIES PAIN

## 2023-08-23 ENCOUNTER — HOSPITAL ENCOUNTER (OUTPATIENT)
Dept: CT IMAGING | Age: 63
Discharge: HOME | End: 2023-08-23
Attending: INTERNAL MEDICINE
Payer: MEDICARE

## 2023-08-23 DIAGNOSIS — Z87.891 PERSONAL HISTORY OF TOBACCO USE: ICD-10-CM

## 2023-08-23 LAB
EKG ATRIAL RATE: 73 BPM
EKG DIAGNOSIS: NORMAL
EKG P AXIS: 49 DEGREES
EKG P-R INTERVAL: 166 MS
EKG Q-T INTERVAL: 360 MS
EKG QRS DURATION: 90 MS
EKG QTC CALCULATION (BAZETT): 396 MS
EKG R AXIS: 23 DEGREES
EKG T AXIS: 18 DEGREES
EKG VENTRICULAR RATE: 73 BPM

## 2023-08-23 PROCEDURE — 71271 CT THORAX LUNG CANCER SCR C-: CPT

## 2023-08-23 PROCEDURE — 93010 ELECTROCARDIOGRAM REPORT: CPT | Performed by: INTERNAL MEDICINE

## 2023-08-25 ENCOUNTER — OFFICE VISIT (OUTPATIENT)
Dept: FAMILY MEDICINE CLINIC | Age: 63
End: 2023-08-25

## 2023-08-25 VITALS
BODY MASS INDEX: 29.7 KG/M2 | HEART RATE: 76 BPM | DIASTOLIC BLOOD PRESSURE: 80 MMHG | SYSTOLIC BLOOD PRESSURE: 132 MMHG | OXYGEN SATURATION: 94 % | WEIGHT: 207 LBS

## 2023-08-25 DIAGNOSIS — J43.9 PULMONARY EMPHYSEMA, UNSPECIFIED EMPHYSEMA TYPE (HCC): ICD-10-CM

## 2023-08-25 DIAGNOSIS — M54.2 NECK PAIN ON LEFT SIDE: ICD-10-CM

## 2023-08-25 DIAGNOSIS — F11.20 OPIOID DEPENDENCE WITH CURRENT USE (HCC): ICD-10-CM

## 2023-08-25 DIAGNOSIS — M54.42 CHRONIC LEFT-SIDED LOW BACK PAIN WITH LEFT-SIDED SCIATICA: Primary | ICD-10-CM

## 2023-08-25 DIAGNOSIS — G89.29 CHRONIC LEFT-SIDED LOW BACK PAIN WITH LEFT-SIDED SCIATICA: Primary | ICD-10-CM
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
## 2023-08-29 NOTE — ED TRIAGE NOTES
Aaron Delvalle is a 38 year old male presenting for physical exam.    Medications reviewed and updated.Denies known Latex allergy or symptoms of Latex sensitivity.    Refills needed today?  Provider will discuss medication refills with patient.         Recent PHQ 2/9 Score    PHQ 2:  PHQ 2 Score Adult PHQ 2 Score Adult PHQ 2 Interpretation Little interest or pleasure in activity?   8/29/2023   7:25 AM 0 No further screening needed 0           Health Maintenance Due   Topic Date Due   • Diabetes Foot Exam  07/17/2020   • Pneumococcal Vaccine 0-64 (2 - PCV) 07/17/2020   • COVID-19 Vaccine (3 - Pfizer series) 06/16/2021   • Diabetes Eye Exam  04/28/2023   • DM/CKD GFR  06/16/2023   • Diabetes A1C  06/20/2023   • Depression Screening  12/20/2023       Patient is due for topics as listed above but is not proceeding with Immunization(s) COVID-19 and Pneumococcal, Diabetes A1C, Diabetes Eye Exam and Diabetes Foot Exam at this time. Provider will discuss health maintenance with patient.     Pt admits to taking one 50mg THC gummy prior to arrival.

## 2023-09-12 DIAGNOSIS — J43.9 PULMONARY EMPHYSEMA, UNSPECIFIED EMPHYSEMA TYPE (HCC): ICD-10-CM

## 2023-09-12 DIAGNOSIS — M54.42 CHRONIC LEFT-SIDED LOW BACK PAIN WITH LEFT-SIDED SCIATICA: ICD-10-CM

## 2023-09-12 DIAGNOSIS — I10 ESSENTIAL HYPERTENSION: ICD-10-CM

## 2023-09-12 DIAGNOSIS — E78.5 HYPERLIPIDEMIA, UNSPECIFIED HYPERLIPIDEMIA TYPE: ICD-10-CM

## 2023-09-12 DIAGNOSIS — G89.29 CHRONIC LEFT-SIDED LOW BACK PAIN WITH LEFT-SIDED SCIATICA: ICD-10-CM

## 2023-09-13 RX ORDER — OXYCODONE HYDROCHLORIDE 10 MG/1
10 TABLET ORAL EVERY 8 HOURS PRN
Qty: 90 TABLET | Refills: 0 | Status: SHIPPED | OUTPATIENT
Start: 2023-09-13 | End: 2023-10-13

## 2023-09-13 RX ORDER — ALBUTEROL SULFATE 90 UG/1
2 AEROSOL, METERED RESPIRATORY (INHALATION) 4 TIMES DAILY PRN
Qty: 18 G | Refills: 5 | Status: SHIPPED | OUTPATIENT
Start: 2023-09-13

## 2023-09-13 RX ORDER — ATORVASTATIN CALCIUM 10 MG/1
TABLET, FILM COATED ORAL
Qty: 90 TABLET | Refills: 0 | Status: SHIPPED | OUTPATIENT
Start: 2023-09-13

## 2023-09-13 RX ORDER — AMLODIPINE BESYLATE 5 MG/1
TABLET ORAL
Qty: 90 TABLET | Refills: 0 | Status: SHIPPED | OUTPATIENT
Start: 2023-09-13

## 2023-09-28 ENCOUNTER — TELEMEDICINE (OUTPATIENT)
Dept: FAMILY MEDICINE CLINIC | Age: 63
End: 2023-09-28

## 2023-09-28 DIAGNOSIS — F11.20 OPIOID DEPENDENCE WITH CURRENT USE (HCC): ICD-10-CM

## 2023-09-28 DIAGNOSIS — J43.9 PULMONARY EMPHYSEMA, UNSPECIFIED EMPHYSEMA TYPE (HCC): ICD-10-CM

## 2023-09-28 DIAGNOSIS — M54.42 CHRONIC LEFT-SIDED LOW BACK PAIN WITH LEFT-SIDED SCIATICA: Primary | ICD-10-CM

## 2023-09-28 DIAGNOSIS — G89.29 CHRONIC LEFT-SIDED LOW BACK PAIN WITH LEFT-SIDED SCIATICA: Primary | ICD-10-CM

## 2023-09-28 ASSESSMENT — PATIENT HEALTH QUESTIONNAIRE - PHQ9
5. POOR APPETITE OR OVEREATING: 0
SUM OF ALL RESPONSES TO PHQ QUESTIONS 1-9: 8
2. FEELING DOWN, DEPRESSED OR HOPELESS: 0
6. FEELING BAD ABOUT YOURSELF - OR THAT YOU ARE A FAILURE OR HAVE LET YOURSELF OR YOUR FAMILY DOWN: 0
SUM OF ALL RESPONSES TO PHQ9 QUESTIONS 1 & 2: 1
9. THOUGHTS THAT YOU WOULD BE BETTER OFF DEAD, OR OF HURTING YOURSELF: 0
SUM OF ALL RESPONSES TO PHQ QUESTIONS 1-9: 8
10. IF YOU CHECKED OFF ANY PROBLEMS, HOW DIFFICULT HAVE THESE PROBLEMS MADE IT FOR YOU TO DO YOUR WORK, TAKE CARE OF THINGS AT HOME, OR GET ALONG WITH OTHER PEOPLE: 1
3. TROUBLE FALLING OR STAYING ASLEEP: 3
4. FEELING TIRED OR HAVING LITTLE ENERGY: 3
8. MOVING OR SPEAKING SO SLOWLY THAT OTHER PEOPLE COULD HAVE NOTICED. OR THE OPPOSITE, BEING SO FIGETY OR RESTLESS THAT YOU HAVE BEEN MOVING AROUND A LOT MORE THAN USUAL: 0
7. TROUBLE CONCENTRATING ON THINGS, SUCH AS READING THE NEWSPAPER OR WATCHING TELEVISION: 1
SUM OF ALL RESPONSES TO PHQ QUESTIONS 1-9: 8
SUM OF ALL RESPONSES TO PHQ QUESTIONS 1-9: 8
1. LITTLE INTEREST OR PLEASURE IN DOING THINGS: 1

## 2023-09-28 ASSESSMENT — ENCOUNTER SYMPTOMS
SHORTNESS OF BREATH: 1
GASTROINTESTINAL NEGATIVE: 1
COUGH: 1
BACK PAIN: 1

## 2023-09-28 NOTE — PROGRESS NOTES
2023    TELEHEALTH EVALUATION -- Audio/Visual (During FJVAK-70 public health emergency)    HPI:    Jose Eduardo Anthony (:  1960) has requested an audio/video evaluation for the following concern(s):    The patient presents for his chronic low back pain which is essentially unchanged. He is seeing NS. See below  He also persists withne ck stiffness/pain and states he has MRI scheduled for that as well at [facility]  He states he feels his COPD is worse since starting Breztri. He has an appointment with his pulmonary in October    Review of Systems   Constitutional:  Positive for activity change. Respiratory:  Positive for cough and shortness of breath. Cardiovascular: Negative. Gastrointestinal: Negative. Endocrine: Negative. Genitourinary: Negative. Musculoskeletal:  Positive for arthralgias, back pain, myalgias and neck pain. Prior to Visit Medications    Medication Sig Taking? Authorizing Provider   albuterol sulfate HFA (VENTOLIN HFA) 108 (90 Base) MCG/ACT inhaler Inhale 2 puffs into the lungs 4 times daily as needed for Wheezing Yes Carlos A Yuan MD   oxyCODONE HCl (OXY-IR) 10 MG immediate release tablet Take 1 tablet by mouth every 8 hours as needed for Pain for up to 30 days.  Intended supply: 30 days Max Daily Amount: 30 mg Yes Allyson Collazo MD   atorvastatin (LIPITOR) 10 MG tablet TAKE 1 TABLET BY MOUTH EVERY DAY Yes WILMER Wilson CNP   amLODIPine (NORVASC) 5 MG tablet TAKE 1 TABLET BY MOUTH EVERY DAY Yes WILMER Wilson CNP   Budeson-Glycopyrrol-Formoterol 160-9-4.8 MCG/ACT AERO Inhale 2 puffs into the lungs in the morning and at bedtime Yes WILMER Briggs CNP   hydroCHLOROthiazide (HYDRODIURIL) 12.5 MG tablet Take 1 tablet by mouth daily Yes Kamilla Loyola MD       Social History     Tobacco Use    Smoking status: Every Day     Packs/day: 1.50     Years: 46.00     Additional pack years: 0.00     Total pack years: 69.00     Types:

## 2023-10-11 ENCOUNTER — HOSPITAL ENCOUNTER (OUTPATIENT)
Dept: MRI IMAGING | Age: 63
Discharge: HOME | End: 2023-10-11
Attending: FAMILY MEDICINE
Payer: MEDICARE

## 2023-10-11 ENCOUNTER — HOSPITAL ENCOUNTER (OUTPATIENT)
Dept: CT IMAGING | Age: 63
Discharge: HOME | End: 2023-10-11
Attending: NEUROLOGICAL SURGERY
Payer: MEDICARE

## 2023-10-11 DIAGNOSIS — M54.50 ACUTE LEFT-SIDED LOW BACK PAIN WITHOUT SCIATICA: ICD-10-CM

## 2023-10-11 DIAGNOSIS — M51.26 LUMBAR HERNIATED DISC: ICD-10-CM

## 2023-10-11 PROCEDURE — 72131 CT LUMBAR SPINE W/O DYE: CPT

## 2023-10-11 PROCEDURE — 72148 MRI LUMBAR SPINE W/O DYE: CPT

## 2023-10-12 RX ORDER — AZITHROMYCIN 250 MG/1
TABLET, FILM COATED ORAL
Qty: 6 TABLET | Refills: 0 | Status: SHIPPED | OUTPATIENT
Start: 2023-10-12 | End: 2023-10-22

## 2023-10-14 ENCOUNTER — PATIENT MESSAGE (OUTPATIENT)
Dept: FAMILY MEDICINE CLINIC | Age: 63
End: 2023-10-14

## 2023-10-16 RX ORDER — AZITHROMYCIN 250 MG/1
TABLET, FILM COATED ORAL
Qty: 6 TABLET | Refills: 0 | Status: SHIPPED | OUTPATIENT
Start: 2023-10-16 | End: 2023-10-20

## 2023-10-16 NOTE — TELEPHONE ENCOUNTER
From: Maria A Petersen  To: Dr. Rebecca Fletcher: 10/14/2023 9:46 AM EDT  Subject: info    Are you able to look at my wifes chart and see whats going on from her ER vist/results. I believe she has a respitory infection as well as a stomach virus. Took her to physical therapy this morning and didnt even want to do it due to her have something contagious therapist saw in her chart. My stomach hasnt felt right for a while. Maybe she got whatever from me. Thinking maybe I need some other medicine. Also I got home with my z pack and lost 2 tablets somehow seems like took pressure and tightness away a little but feel i do need to make sure I take a hole z pack correctly. And thank you.   Amy Palumbo 07/09/1973     Marianna Button  [phone]

## 2023-10-17 ENCOUNTER — PATIENT MESSAGE (OUTPATIENT)
Dept: FAMILY MEDICINE CLINIC | Age: 63
End: 2023-10-17

## 2023-10-17 DIAGNOSIS — D35.02 ADENOMA OF LEFT ADRENAL GLAND: Primary | ICD-10-CM

## 2023-10-18 NOTE — TELEPHONE ENCOUNTER
From: Selena Ham  To: Dr. Colt Brady: 10/17/2023 8:28 PM EDT  Subject: CT Lumbar    Sorry to bother you again, doctor Claudia Knowles sent me for cat scan. Those results are in my chart. I was hoping you could give me your opinion and thoughts. That way, I have your info and doctor Claudia Knolwes. And I'm concerned about what they're talking about and my abdomen. I don't recall ever hearing or seeing anything like that. I see doctor Claudia Knowles the 24th I would like to have 2 opinions.     Gwenith John Paul  Thank you

## 2023-10-19 ENCOUNTER — TELEPHONE (OUTPATIENT)
Dept: FAMILY MEDICINE CLINIC | Age: 63
End: 2023-10-19

## 2023-10-19 NOTE — TELEPHONE ENCOUNTER
Patient is having MRI done at [facility] in [place]  Patient is asking for order faxed  [phone]  faxed

## 2023-10-20 ENCOUNTER — OFFICE VISIT (OUTPATIENT)
Dept: PULMONOLOGY | Age: 63
End: 2023-10-20

## 2023-10-20 VITALS
OXYGEN SATURATION: 96 % | HEART RATE: 80 BPM | HEIGHT: 70 IN | SYSTOLIC BLOOD PRESSURE: 130 MMHG | DIASTOLIC BLOOD PRESSURE: 72 MMHG | WEIGHT: 200 LBS | BODY MASS INDEX: 28.63 KG/M2

## 2023-10-20 DIAGNOSIS — J44.9 COPD, MODERATE (HCC): Primary | ICD-10-CM

## 2023-10-20 DIAGNOSIS — F17.210 CIGARETTE NICOTINE DEPENDENCE WITHOUT COMPLICATION: ICD-10-CM

## 2023-10-20 RX ORDER — PREDNISONE 10 MG/1
TABLET ORAL
Qty: 30 TABLET | Refills: 0 | Status: SHIPPED | OUTPATIENT
Start: 2023-10-20 | End: 2023-10-30

## 2023-10-20 RX ORDER — ALBUTEROL SULFATE 90 UG/1
2 AEROSOL, METERED RESPIRATORY (INHALATION) 4 TIMES DAILY PRN
Qty: 18 G | Refills: 5 | Status: SHIPPED | OUTPATIENT
Start: 2023-10-20

## 2023-10-20 RX ORDER — DOXYCYCLINE HYCLATE 100 MG
100 TABLET ORAL 2 TIMES DAILY
Qty: 10 TABLET | Refills: 0 | Status: SHIPPED | OUTPATIENT
Start: 2023-10-20 | End: 2023-10-25

## 2023-10-20 ASSESSMENT — ENCOUNTER SYMPTOMS
RHINORRHEA: 0
ABDOMINAL PAIN: 0
COLOR CHANGE: 0
CONSTIPATION: 0
CHOKING: 0
SINUS PRESSURE: 0
BACK PAIN: 0
SORE THROAT: 0
WHEEZING: 1
ABDOMINAL DISTENTION: 0
APNEA: 0
STRIDOR: 0
VOICE CHANGE: 0
DIARRHEA: 0
VOMITING: 0
SHORTNESS OF BREATH: 1
CHEST TIGHTNESS: 1
COUGH: 1
BLOOD IN STOOL: 0

## 2023-10-20 NOTE — PROGRESS NOTES
Brina Living    YOB: 1960     Date of Service:  10/20/2023     Chief Complaint   Patient presents with    1 Month Follow-Up    COPD       HPI patient states that he is concerned about his health. Approximately a week or so ago, he has developed increased shortness of breath, wheezing and a cough with mucoid phlegm. His wife has been sick with similar symptoms and required hospitalization. States that she has been tested for COVID and noted to be negative. Denies any flulike symptoms. Allergies   Allergen Reactions    Nucynta [Tapentadol] Anaphylaxis     Hallucinations. Went to hospital.    Vicodin [Hydrocodone-Acetaminophen]      Itching      Codeine Rash     itching    Tylenol [Acetaminophen] Rash     Itching, pt states he is able to take     Outpatient Medications Marked as Taking for the 10/20/23 encounter (Office Visit) with Jill Arias MD   Medication Sig Dispense Refill    predniSONE (DELTASONE) 10 MG tablet Take 40 mg by mouth for 3 days 30 mg for 3 days 20 mg for 3 days 10 mg for 3 days. 30 tablet 0    doxycycline hyclate (VIBRA-TABS) 100 MG tablet Take 1 tablet by mouth 2 times daily for 5 days 10 tablet 0    albuterol sulfate HFA (VENTOLIN HFA) 108 (90 Base) MCG/ACT inhaler Inhale 2 puffs into the lungs 4 times daily as needed for Wheezing 18 g 5    oxyCODONE HCl (OXY-IR) 10 MG immediate release tablet Take 1 tablet by mouth every 8 hours as needed for Pain for up to 30 days.  Intended supply: 30 days Max Daily Amount: 30 mg 90 tablet 0    atorvastatin (LIPITOR) 10 MG tablet TAKE 1 TABLET BY MOUTH EVERY DAY 90 tablet 0    amLODIPine (NORVASC) 5 MG tablet TAKE 1 TABLET BY MOUTH EVERY DAY 90 tablet 0    hydroCHLOROthiazide (HYDRODIURIL) 12.5 MG tablet Take 1 tablet by mouth daily 90 tablet 3       Immunization History   Administered Date(s) Administered    COVID-19, PFIZER PURPLE top, DILUTE for use, (age 15 y+), 30mcg/0.3mL 03/16/2021, 04/06/2021, 10/07/2021

## 2023-10-28 ENCOUNTER — PATIENT MESSAGE (OUTPATIENT)
Dept: PULMONOLOGY | Age: 63
End: 2023-10-28

## 2023-10-30 NOTE — TELEPHONE ENCOUNTER
From: Reese García  To: Dr. Narinder Astudillo: 10/28/2023 12:01 AM EDT  Subject: Record and info. I saw you on 10-20. Perscribed presidone and other for respitory problem. Worked great for 5days way better breathing. Today is the 27th have a couple more days of presidone and tonight seems like its hanging on or like coming back a little. Dont think need to do anything yet will finish medicine and see then. This is just for future so you now how when and whats going on for a possible future encounter. I forget things so this will serve as a record. As well as from now on with anything related to [facility] problems I will record it here for our records.     Thank Benedicto Valdes  [phone]

## 2023-11-01 NOTE — TELEPHONE ENCOUNTER
Spoke with patient. Informed of the prednisone being extended. When questioning about his mouth/ tongue. Patient states he does have white spots in his mouth. Advised pt to hold Breztri and we will call in Nystatin for him to use. Dr. Tuan Caba, do you want to change the Breztri inhaler since pt has thrush?

## 2023-11-02 NOTE — TELEPHONE ENCOUNTER
Spoke with patient. He will try using Breztri with spacer. Advised pt to make sure he rinses mouth after each use. Also suggested to try and brush teeth or use mouthwash to rinse mouth since he states he had been rinsing his mouth with water after using the Breztri. He will contact office if he continues to have an issue with the [facility].

## 2023-11-13 DIAGNOSIS — M54.42 CHRONIC LEFT-SIDED LOW BACK PAIN WITH LEFT-SIDED SCIATICA: ICD-10-CM

## 2023-11-13 DIAGNOSIS — G89.29 CHRONIC LEFT-SIDED LOW BACK PAIN WITH LEFT-SIDED SCIATICA: ICD-10-CM

## 2023-11-13 RX ORDER — OXYCODONE HYDROCHLORIDE 10 MG/1
10 TABLET ORAL EVERY 8 HOURS PRN
Qty: 90 TABLET | Refills: 0 | Status: SHIPPED | OUTPATIENT
Start: 2023-11-13 | End: 2023-12-13

## 2023-11-13 NOTE — TELEPHONE ENCOUNTER
Medication:   Requested Prescriptions     Pending Prescriptions Disp Refills    oxyCODONE HCl (OXY-IR) 10 MG immediate release tablet 90 tablet 0     Sig: Take 1 tablet by mouth every 8 hours as needed for Pain for up to 30 days. Intended supply: 30 days Max Daily Amount: 30 mg        Last Filled:  10/16/2023    Patient Phone Number: [phone] (home)     Last appt: 9/28/2023   Next appt: 11/16/2023    Last OARRS:       9/28/2023     4:07 PM   RX Monitoring   Periodic Controlled Substance Monitoring No signs of potential drug abuse or diversion identified.

## 2023-11-14 RX ORDER — ALBUTEROL SULFATE 90 UG/1
2 AEROSOL, METERED RESPIRATORY (INHALATION) 4 TIMES DAILY PRN
Qty: 18 G | Refills: 5 | Status: SHIPPED | OUTPATIENT
Start: 2023-11-14

## 2023-11-16 ENCOUNTER — OFFICE VISIT (OUTPATIENT)
Dept: FAMILY MEDICINE CLINIC | Age: 63
End: 2023-11-16

## 2023-11-16 VITALS
HEART RATE: 75 BPM | SYSTOLIC BLOOD PRESSURE: 118 MMHG | HEIGHT: 70 IN | OXYGEN SATURATION: 97 % | DIASTOLIC BLOOD PRESSURE: 80 MMHG | BODY MASS INDEX: 29.49 KG/M2 | WEIGHT: 206 LBS

## 2023-11-16 DIAGNOSIS — G89.29 CHRONIC LEFT-SIDED LOW BACK PAIN WITH LEFT-SIDED SCIATICA: Primary | ICD-10-CM

## 2023-11-16 DIAGNOSIS — M54.2 NECK PAIN ON LEFT SIDE: ICD-10-CM

## 2023-11-16 DIAGNOSIS — M54.42 CHRONIC LEFT-SIDED LOW BACK PAIN WITH LEFT-SIDED SCIATICA: Primary | ICD-10-CM

## 2023-11-16 DIAGNOSIS — J43.9 PULMONARY EMPHYSEMA, UNSPECIFIED EMPHYSEMA TYPE (HCC): ICD-10-CM

## 2023-11-16 DIAGNOSIS — R06.02 SOB (SHORTNESS OF BREATH): ICD-10-CM

## 2023-11-16 DIAGNOSIS — R10.84 GENERALIZED ABDOMINAL PAIN: ICD-10-CM

## 2023-11-16 DIAGNOSIS — F11.20 OPIOID DEPENDENCE WITH CURRENT USE (HCC): ICD-10-CM

## 2023-11-16 ASSESSMENT — ENCOUNTER SYMPTOMS
BACK PAIN: 1
ABDOMINAL PAIN: 1
SHORTNESS OF BREATH: 1
ABDOMINAL DISTENTION: 1

## 2023-11-16 NOTE — PROGRESS NOTES
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 anesthesia.  The flap was excised through the skin and subcutaneous tissue down to the layer of the underlying musculature.  The interpolation flap was carefully excised within this deep plane to maintain its blood supply.  The edges of the donor site were undermined.   The donor site was closed in a primary fashion.  The pedicle was then rotated into position and sutured.  Once the tube was sutured into place, adequate blood supply was confirmed with blanching and refill.  The pedicle was then wrapped with xeroform gauze and dressed appropriately with a telfa and gauze bandage to ensure continued blood supply and protect the attached pedicle. Graft Donor Site Epidermal Sutures (Optional): 5-0 Ethibond Primary Defect Width In Cm (Final Defect Size - Required For Flaps/Grafts): 1.4 Skin Substitute Text: The defect edges were debeveled with a #15 scalpel blade.  Given the location of the defect, shape of the defect and the proximity to free margins a skin substitute graft was deemed most appropriate.  The graft material was trimmed to fit the size of the defect. The graft was then placed in the primary defect and oriented appropriately. Secondary Intention Text (Leave Blank If You Do Not Want): The defect will heal with secondary intention. Trilobed Flap Text: The defect edges were debeveled with a #15 scalpel blade.  Given the location of the defect and the proximity to free margins a trilobed flap was deemed most appropriate.  Using a sterile surgical marker, an appropriate trilobed flap drawn around the defect.    The area thus outlined was incised deep to adipose tissue with a #15 scalpel blade.  The skin margins were undermined to an appropriate distance in all directions utilizing iris scissors. Tissue Cultured Epidermal Autograft Text: The defect edges were debeveled with a #15 scalpel blade.  Given the location of the defect, shape of the defect and the proximity to free margins a tissue cultured epidermal autograft was deemed most appropriate.  The graft was then trimmed to fit the size of the defect.  The graft was then placed in the primary defect and oriented appropriately. No Repair - Repaired With Adjacent Surgical Defect Text (Leave Blank If You Do Not Want): After obtaining clear surgical margins the defect was repaired concurrently with another surgical defect which was in close approximation. Hatchet Flap Text: The defect edges were debeveled with a #15 scalpel blade.  Given the location of the defect, shape of the defect and the proximity to free margins a hatchet flap based from the glabella was deemed most appropriate.  Using a sterile surgical marker, an appropriate glabellar hatchet flap was drawn incorporating the defect and placing the expected incisions within the relaxed skin tension lines where possible.    The area thus outlined was incised deep to adipose tissue with a #15 scalpel blade.  The skin margins were undermined to an appropriate distance in all directions utilizing iris scissors. Paramedian Forehead Flap Text: A decision was made to reconstruct the defect utilizing an interpolation axial flap and a staged reconstruction.  A telfa template was made of the defect.  This telfa template was then used to outline the paramedian forehead pedicle flap.  The donor area for the pedicle flap was then injected with anesthesia.  The flap was excised through the skin and subcutaneous tissue down to the layer of the underlying musculature.  The pedicle flap was carefully excised within this deep plane to maintain its blood supply.  The edges of the donor site were undermined.   The donor site was closed in a primary fashion.  The pedicle was then rotated into position and sutured.  Once the tube was sutured into place, adequate blood supply was confirmed with blanching and refill.  The pedicle was then wrapped with xeroform gauze and dressed appropriately with a telfa and gauze bandage to ensure continued blood supply and protect the attached pedicle. Consent (Ear)/Introductory Paragraph: The rationale for Mohs was explained to the patient and consent was obtained. The risks, benefits and alternatives to therapy were discussed in detail. Specifically, the risks of ear deformity, infection, scarring, bleeding, prolonged wound healing, incomplete removal, allergy to anesthesia, nerve injury and recurrence were addressed. Prior to the procedure, the treatment site was clearly identified and confirmed by the patient. All components of Universal Protocol/PAUSE Rule completed. Unna Boot Text: An Unna boot was placed to help immobilize the limb and facilitate more rapid healing. Double Island Pedicle Flap Text: The defect edges were debeveled with a #15 scalpel blade.  Given the location of the defect, shape of the defect and the proximity to free margins a double island pedicle advancement flap was deemed most appropriate.  Using a sterile surgical marker, an appropriate advancement flap was drawn incorporating the defect, outlining the appropriate donor tissue and placing the expected incisions within the relaxed skin tension lines where possible.    The area thus outlined was incised deep to adipose tissue with a #15 scalpel blade.  The skin margins were undermined to an appropriate distance in all directions around the primary defect and laterally outward around the island pedicle utilizing iris scissors.  There was minimal undermining beneath the pedicle flap. Partial Purse String (Simple) Text: Given the location of the defect and the characteristics of the surrounding skin a simple purse string closure was deemed most appropriate.  Undermining was performed circumfirentially around the surgical defect.  A purse string suture was then placed and tightened. Wound tension only allowed a partial closure of the circular defect. Full Thickness Lip Wedge Repair (Flap) Text: Given the location of the defect and the proximity to free margins a full thickness wedge repair was deemed most appropriate.  Using a sterile surgical marker, the appropriate repair was drawn incorporating the defect and placing the expected incisions perpendicular to the vermilion border.  The vermilion border was also meticulously outlined to ensure appropriate reapproximation during the repair.  The area thus outlined was incised through and through with a #15 scalpel blade.  The muscularis and dermis were reaproximated with deep sutures following hemostasis. Care was taken to realign the vermilion border before proceeding with the superficial closure.  Once the vermilion was realigned the superfical and mucosal closure was finished. Referring Physician (Optional): SAM Hughes Transposition Flap Text: The defect edges were debeveled with a #15 scalpel blade.  Given the location of the defect and the proximity to free margins a transposition flap was deemed most appropriate.  Using a sterile surgical marker, an appropriate transposition flap was drawn incorporating the defect.    The area thus outlined was incised deep to adipose tissue with a #15 scalpel blade.  The skin margins were undermined to an appropriate distance in all directions utilizing iris scissors. Detail Level: Detailed Post-Care Instructions: I reviewed with the patient in detail post-care instructions. Patient is not to engage in any heavy lifting, exercise, or swimming for the next 14 days. Should the patient develop any fevers, chills, bleeding, severe pain patient will contact the office immediately. Advancement Flap (Double) Text: The defect edges were debeveled with a #15 scalpel blade.  Given the location of the defect and the proximity to free margins a double advancement flap was deemed most appropriate.  Using a sterile surgical marker, the appropriate advancement flaps were drawn incorporating the defect and placing the expected incisions within the relaxed skin tension lines where possible.    The area thus outlined was incised deep to adipose tissue with a #15 scalpel blade.  The skin margins were undermined to an appropriate distance in all directions utilizing iris scissors. O-Z Plasty Text: The defect edges were debeveled with a #15 scalpel blade.  Given the location of the defect, shape of the defect and the proximity to free margins an O-Z plasty (double transposition flap) was deemed most appropriate.  Using a sterile surgical marker, the appropriate transposition flaps were drawn incorporating the defect and placing the expected incisions within the relaxed skin tension lines where possible.    The area thus outlined was incised deep to adipose tissue with a #15 scalpel blade.  The skin margins were undermined to an appropriate distance in all directions utilizing iris scissors.  Hemostasis was achieved with electrocautery.  The flaps were then transposed into place, one clockwise and the other counterclockwise, and anchored with interrupted buried subcutaneous sutures. Complex Repair And Flap Additional Text (Will Appearing After The Standard Complex Repair Text): The complex repair was not sufficient to completely close the primary defect. The remaining additional defect was repaired with the flap mentioned below. Area L Indication Text: Tumors in this location are included in Area L (trunk and extremities).  Mohs surgery is indicated for larger tumors, or tumors with aggressive histologic features, in these anatomic locations. Dressing: dry sterile dressing Repair Anesthesia Method: local infiltration Bcc Histology Text: There were numerous aggregates of basaloid cells. Length To Time In Minutes Device Was In Place: 10 Bi-Rhombic Flap Text: The defect edges were debeveled with a #15 scalpel blade.  Given the location of the defect and the proximity to free margins a bi-rhombic flap was deemed most appropriate.  Using a sterile surgical marker, an appropriate rhombic flap was drawn incorporating the defect. The area thus outlined was incised deep to adipose tissue with a #15 scalpel blade.  The skin margins were undermined to an appropriate distance in all directions utilizing iris scissors. Mauc Instructions: By selecting yes to the question below the MAUC number will be added into the note.  This will be calculated automatically based on the diagnosis chosen, the size entered, the body zone selected (H,M,L) and the specific indications you chose. You will also have the option to override the Mohs AUC if you disagree with the automatically calculated number and this option is found in the Case Summary tab. Unique Flap 4 Name: Banner Flap O-L Flap Text: The defect edges were debeveled with a #15 scalpel blade.  Given the location of the defect, shape of the defect and the proximity to free margins an O-L flap was deemed most appropriate.  Using a sterile surgical marker, an appropriate advancement flap was drawn incorporating the defect and placing the expected incisions within the relaxed skin tension lines where possible.    The area thus outlined was incised deep to adipose tissue with a #15 scalpel blade.  The skin margins were undermined to an appropriate distance in all directions utilizing iris scissors. W Plasty Text: The lesion was extirpated to the level of the fat with a #15 scalpel blade.  Given the location of the defect, shape of the defect and the proximity to free margins a W-plasty was deemed most appropriate for repair.  Using a sterile surgical marker, the appropriate transposition arms of the W-plasty were drawn incorporating the defect and placing the expected incisions within the relaxed skin tension lines where possible.    The area thus outlined was incised deep to adipose tissue with a #15 scalpel blade.  The skin margins were undermined to an appropriate distance in all directions utilizing iris scissors.  The opposing transposition arms were then transposed into place in opposite direction and anchored with interrupted buried subcutaneous sutures. Estimated Blood Loss (Cc): less than 5 cc Alternatives Discussed Intro (Do Not Add Period): I discussed alternative treatments to Mohs surgery and specifically discussed the risks and benefits of Anesthesia Volume In Cc: 6 Composite Graft Text: The defect edges were debeveled with a #15 scalpel blade.  Given the location of the defect, shape of the defect, the proximity to free margins and the fact the defect was full thickness a composite graft was deemed most appropriate.  The defect was outline and then transferred to the donor site.  A full thickness graft was then excised from the donor site. The graft was then placed in the primary defect, oriented appropriately and then sutured into place.  The secondary defect was then repaired using a primary closure. Location Indication Override (Is Already Calculated Based On Selected Body Location): Area M Banner Transposition Flap Text: The defect edges were debeveled with a #15 scalpel blade.  Given the location of the defect and the proximity to free margins a Banner transposition flap was deemed most appropriate.  Using a sterile surgical marker, an appropriate flap drawn around the defect. The area thus outlined was incised deep to adipose tissue with a #15 scalpel blade.  The skin margins were undermined to an appropriate distance in all directions utilizing iris scissors. Eye Protection Verbiage: Before proceeding with the stage, a plastic scleral shield was inserted. The globe was anesthetized with a few drops of 1% lidocaine with 1:100,000 epinephrine. Then, an appropriate sized scleral shield was chosen and coated with lacrilube ointment. The shield was gently inserted and left in place for the duration of each stage. After the stage was completed, the shield was gently removed. Advancement-Rotation Flap Text: The defect edges were debeveled with a #15 scalpel blade.  Given the location of the defect, shape of the defect and the proximity to free margins an advancement-rotation flap was deemed most appropriate.  Using a sterile surgical marker, an appropriate flap was drawn incorporating the defect and placing the expected incisions within the relaxed skin tension lines where possible. The area thus outlined was incised deep to adipose tissue with a #15 scalpel blade.  The skin margins were undermined to an appropriate distance in all directions utilizing iris scissors. Interpolation Flap Text: A decision was made to reconstruct the defect utilizing an interpolation axial flap and a staged reconstruction.  A telfa template was made of the defect.  This telfa template was then used to outline the interpolation flap.  The donor area for the pedicle flap was then injected with anesthesia.  The flap was excised through the skin and subcutaneous tissue down to the layer of the underlying musculature.  The interpolation flap was carefully excised within this deep plane to maintain its blood supply.  The edges of the donor site were undermined.   The donor site was closed in a primary fashion.  The pedicle was then rotated into position and sutured.  Once the tube was sutured into place, adequate blood supply was confirmed with blanching and refill.  The pedicle was then wrapped with xeroform gauze and dressed appropriately with a telfa and gauze bandage to ensure continued blood supply and protect the attached pedicle. Unique Flap 4 Text: The defect edges were debeveled with a #15 scalpel blade.  Given the location of the defect and the proximity to free margins a Banner transposition flap was deemed most appropriate.  Using a sterile surgical marker, an appropriate Banner transposition flap was drawn incorporating the defect.    The area thus outlined was incised deep to adipose tissue with a #15 scalpel blade.  The skin margins were undermined to an appropriate distance in all directions utilizing iris scissors. Consent (Temporal Branch)/Introductory Paragraph: The rationale for Mohs was explained to the patient and consent was obtained. The risks, benefits and alternatives to therapy were discussed in detail. Specifically, the risks of damage to the temporal branch of the facial nerve, infection, scarring, bleeding, prolonged wound healing, incomplete removal, allergy to anesthesia, and recurrence were addressed. Prior to the procedure, the treatment site was clearly identified and confirmed by the patient. All components of Universal Protocol/PAUSE Rule completed. Repair Type: Complex Repair Closure 4 Information: This tab is for additional flaps and grafts above and beyond our usual structured repairs.  Please note if you enter information here it will not currently bill and you will need to add the billing information manually. Dorsal Nasal Flap Text: The defect edges were debeveled with a #15 scalpel blade.  Given the location of the defect and the proximity to free margins a dorsal nasal flap,based upon the glabellar folds, was deemed most appropriate.  Using a sterile surgical marker, an appropriate dorsal nasal flap was drawn around the defect.    The area thus outlined was incised deep to adipose tissue with a #15 scalpel blade.  The skin margins were undermined to an appropriate distance in all directions utilizing iris scissors. Island Pedicle Flap Text: The defect edges were debeveled with a #15 scalpel blade.  Given the location of the defect, shape of the defect and the proximity to free margins an island pedicle advancement flap was deemed most appropriate.  Using a sterile surgical marker, an appropriate advancement flap was drawn incorporating the defect, outlining the appropriate donor tissue and placing the expected incisions within the relaxed skin tension lines where possible.    The area thus outlined was incised deep to adipose tissue with a #15 scalpel blade.  The skin margins were undermined to an appropriate distance in all directions around the primary defect and laterally outward around the island pedicle utilizing iris scissors.  There was minimal undermining beneath the pedicle flap. Xenograft Text: The defect edges were debeveled with a #15 scalpel blade.  Given the location of the defect, shape of the defect and the proximity to free margins a xenograft was deemed most appropriate.  The graft was then trimmed to fit the size of the defect.  The graft was then placed in the primary defect and oriented appropriately. Previous Accession (Optional): [id number] Same Histology In Subsequent Stages Text: The pattern and morphology of the tumor is as described in the first stage. Rotation Flap Text: The defect edges were debeveled with a #15 scalpel blade.  Given the location of the defect, shape of the defect and the proximity to free margins a rotation flap was deemed most appropriate.  Using a sterile surgical marker, an appropriate rotation flap was drawn incorporating the defect and placing the expected incisions within the relaxed skin tension lines where possible.    The area thus outlined was incised deep to adipose tissue with a #15 scalpel blade.  The skin margins were undermined to an appropriate distance in all directions utilizing iris scissors. Consent (Nose)/Introductory Paragraph: The rationale for Mohs was explained to the patient and consent was obtained. The risks, benefits and alternatives to therapy were discussed in detail. Specifically, the risks of nasal deformity, changes in the flow of air through the nose, infection, scarring, bleeding, prolonged wound healing, incomplete removal, allergy to anesthesia, nerve injury and recurrence were addressed. Prior to the procedure, the treatment site was clearly identified and confirmed by the patient. All components of Universal Protocol/PAUSE Rule completed. Cheiloplasty (Less Than 50%) Text: A decision was made to reconstruct the defect with a  cheiloplasty.  The defect was undermined extensively.  Additional obicularis oris muscle was excised with a 15 blade scalpel.  The defect was converted into a full thickness wedge, of less than 50% of the vertical height of the lip, to facilite a better cosmetic result.  Small vessels were then tied off with 5-0 monocyrl. The obicularis oris, superficial fascia, adipose and dermis were then reapproximated.  After the deeper layers were approximated the epidermis was reapproximated with particular care given to realign the vermilion border. Home Suture Removal Text: Patient was provided instructions on removing sutures and will remove their sutures at home.  If they have any questions or difficulties they will call the office. Anesthesia Type: 0.5% lidocaine with 1:200,000 epinephrine and a 1:10 solution of 8.4% sodium bicarbonate and 408mcg clindamycin/ml Island Pedicle Flap-Requiring Vessel Identification Text: The defect edges were debeveled with a #15 scalpel blade.  Given the location of the defect, shape of the defect and the proximity to free margins an island pedicle advancement flap was deemed most appropriate.  Using a sterile surgical marker, an appropriate advancement flap was drawn, based on the axial vessel mentioned above, incorporating the defect, outlining the appropriate donor tissue and placing the expected incisions within the relaxed skin tension lines where possible.    The area thus outlined was incised deep to adipose tissue with a #15 scalpel blade.  The skin margins were undermined to an appropriate distance in all directions around the primary defect and laterally outward around the island pedicle utilizing iris scissors.  There was minimal undermining beneath the pedicle flap. Partial Purse String (Intermediate) Text: Given the location of the defect and the characteristics of the surrounding skin an intermediate purse string closure was deemed most appropriate.  Undermining was performed circumfirentially around the surgical defect.  A purse string suture was then placed and tightened. Wound tension only allowed a partial closure of the circular defect. Suturegard Intro: Intraoperative tissue expansion was performed, utilizing the SUTUREGARD device, in order to reduce wound tension. Consent Type: Consent 1 (Standard) Muscle Hinge Flap Text: The defect edges were debeveled with a #15 scalpel blade.  Given the size, depth and location of the defect and the proximity to free margins a muscle hinge flap was deemed most appropriate.  Using a sterile surgical marker, an appropriate hinge flap was drawn incorporating the defect. The area thus outlined was incised with a #15 scalpel blade.  The skin margins were undermined to an appropriate distance in all directions utilizing iris scissors. Ftsg Text: The defect edges were debeveled with a #15 scalpel blade.  Given the location of the defect, shape of the defect and the proximity to free margins a full thickness skin graft was deemed most appropriate.  Using a sterile surgical marker, the primary defect shape was transferred to the donor site. The area thus outlined was incised deep to adipose tissue with a #15 scalpel blade.  The harvested graft was then trimmed of adipose tissue until only dermis and epidermis was left.  The skin margins of the secondary defect were undermined to an appropriate distance in all directions utilizing iris scissors.  The secondary defect was closed with interrupted buried subcutaneous sutures.  The skin edges were then re-apposed with running  sutures.  The skin graft was then placed in the primary defect and oriented appropriately. Suturegard Retention Suture: 2-0 Nylon Epidermal Sutures: 5-0 Ethilon Burow's Advancement Flap Text: The defect edges were debeveled with a #15 scalpel blade.  Given the location of the defect and the proximity to free margins a Burow's advancement flap was deemed most appropriate.  Using a sterile surgical marker, the appropriate advancement flap was drawn incorporating the defect and placing the expected incisions within the relaxed skin tension lines where possible.    The area thus outlined was incised deep to adipose tissue with a #15 scalpel blade.  The skin margins were undermined to an appropriate distance in all directions utilizing iris scissors. Double O-Z Plasty Text: The defect edges were debeveled with a #15 scalpel blade.  Given the location of the defect, shape of the defect and the proximity to free margins a Double O-Z plasty (double transposition flap) was deemed most appropriate.  Using a sterile surgical marker, the appropriate transposition flaps were drawn incorporating the defect and placing the expected incisions within the relaxed skin tension lines where possible. The area thus outlined was incised deep to adipose tissue with a #15 scalpel blade.  The skin margins were undermined to an appropriate distance in all directions utilizing iris scissors.  Hemostasis was achieved with electrocautery.  The flaps were then transposed into place, one clockwise and the other counterclockwise, and anchored with interrupted buried subcutaneous sutures. Complex Repair And Graft Additional Text (Will Appearing After The Standard Complex Repair Text): The complex repair was not sufficient to completely close the primary defect. The remaining additional defect was repaired with the graft mentioned below. Unique Flap 1 Name: Myocutaneous Island pedicle Flap Crescentic Advancement Flap Text: The defect edges were debeveled with a #15 scalpel blade.  Given the location of the defect and the proximity to free margins a crescentic advancement flap was deemed most appropriate.  Using a sterile surgical marker, the appropriate advancement flap was drawn incorporating the defect and placing the expected incisions within the relaxed skin tension lines where possible.    The area thus outlined was incised deep to adipose tissue with a #15 scalpel blade.  The skin margins were undermined to an appropriate distance in all directions utilizing iris scissors. S Plasty Text: Given the location and shape of the defect, and the orientation of relaxed skin tension lines, an S-plasty was deemed most appropriate for repair.  Using a sterile surgical marker, the appropriate outline of the S-plasty was drawn, incorporating the defect and placing the expected incisions within the relaxed skin tension lines where possible.  The area thus outlined was incised deep to adipose tissue with a #15 scalpel blade.  The skin margins were undermined to an appropriate distance in all directions utilizing iris scissors. The skin flaps were advanced over the defect.  The opposing margins were then approximated with interrupted buried subcutaneous sutures. Graft Basting Suture (Optional): 5-0 Fast Absorbing Gut Helical Rim Advancement Flap Text: The defect edges were debeveled with a #15 blade scalpel.  Given the location of the defect and the proximity to free margins (helical rim) a double helical rim advancement flap was deemed most appropriate.  Using a sterile surgical marker, the appropriate advancement flaps were drawn incorporating the defect and placing the expected incisions between the helical rim and antihelix where possible.  The area thus outlined was incised through and through with a #15 scalpel blade.  With a skin hook and iris scissors, the flaps were gently and sharply undermined and freed up. Bcc Infiltrative Histology Text: There were numerous aggregates of basaloid cells demonstrating an infiltrative pattern. O-Z Flap Text: The defect edges were debeveled with a #15 scalpel blade.  Given the location of the defect, shape of the defect and the proximity to free margins an O-Z flap was deemed most appropriate.  Using a sterile surgical marker, an appropriate transposition flap was drawn incorporating the defect and placing the expected incisions within the relaxed skin tension lines where possible. The area thus outlined was incised deep to adipose tissue with a #15 scalpel blade.  The skin margins were undermined to an appropriate distance in all directions utilizing iris scissors. Z Plasty Text: The lesion was extirpated to the level of the fat with a #15 scalpel blade.  Given the location of the defect, shape of the defect and the proximity to free margins a Z-plasty was deemed most appropriate for repair.  Using a sterile surgical marker, the appropriate transposition arms of the Z-plasty were drawn incorporating the defect and placing the expected incisions within the relaxed skin tension lines where possible.    The area thus outlined was incised deep to adipose tissue with a #15 scalpel blade.  The skin margins were undermined to an appropriate distance in all directions utilizing iris scissors.  The opposing transposition arms were then transposed into place in opposite direction and anchored with interrupted buried subcutaneous sutures. Unique Flap 1 Text: A decision was made to reconstruct the defect utilizing a myocutaneous Island pedicle Flap based on the levator labii superioris muscle.  A telfa template was made of the defect.  This telfa template was then used to outline the myocutaneous flap, based along the meilolabial fold.  The donor area for the pedicle flap was then injected with anesthesia.  The flap was excised through the skin and subcutaneous tissue down to the layer of the underlying musculature.  The myocutaneous flap was carefully excised within this deep plane to maintain its blood supply. Based on the muscle. The edges of the donor site were undermined.   The donor site was closed in a primary fashion to the point of transposition.  The pedicle was then transposed into position and sutured.  Once the flap was sutured into place, adequate blood supply was confirmed with blanching and refill. Consent 1/Introductory Paragraph: The rationale for Mohs was explained to the patient and consent was obtained. The risks, benefits and alternatives to therapy were discussed in detail. Specifically, the risks of infection, scarring, bleeding, prolonged wound healing, incomplete removal, allergy to anesthesia, nerve injury and recurrence were addressed. Prior to the procedure, the treatment site was clearly identified and confirmed by the patient. All components of Universal Protocol/PAUSE Rule completed. Number Of Stages: 3 Non-Graft Cartilage Fenestration Text: The cartilage was fenestrated with a 2mm punch biopsy to help facilitate healing. Bilobed Flap Text: The defect edges were debeveled with a #15 scalpel blade.  Given the location of the defect and the proximity to free margins a bilobe flap was deemed most appropriate.  Using a sterile surgical marker, an appropriate bilobe flap drawn around the defect.    The area thus outlined was incised deep to adipose tissue with a #15 scalpel blade.  The skin margins were undermined to an appropriate distance in all directions utilizing iris scissors. Epidermal Autograft Text: The defect edges were debeveled with a #15 scalpel blade.  Given the location of the defect, shape of the defect and the proximity to free margins an epidermal autograft was deemed most appropriate.  Using a sterile surgical marker, the primary defect shape was transferred to the donor site. The epidermal graft was then harvested.  The skin graft was then placed in the primary defect and oriented appropriately. Information: Selecting Yes will display possible errors in your note based on the variables you have selected. This validation is only offered as a suggestion for you. PLEASE NOTE THAT THE VALIDATION TEXT WILL BE REMOVED WHEN YOU FINALIZE YOUR NOTE. IF YOU WANT TO FAX A PRELIMINARY NOTE YOU WILL NEED TO TOGGLE THIS TO 'NO' IF YOU DO NOT WANT IT IN YOUR FAXED NOTE. Mohs Method Verbiage: An incision at a 45 degree angle following the standard Mohs approach was done and the specimen was harvested as a microscopic controlled layer. Mercedes Flap Text: The defect edges were debeveled with a #15 scalpel blade.  Given the location of the defect, shape of the defect and the proximity to free margins a Mercedes flap was deemed most appropriate.  Using a sterile surgical marker, an appropriate advancement flap was drawn incorporating the defect and placing the expected incisions within the relaxed skin tension lines where possible. The area thus outlined was incised deep to adipose tissue with a #15 scalpel blade.  The skin margins were undermined to an appropriate distance in all directions utilizing iris scissors. Melolabial Interpolation Flap Text: A decision was made to reconstruct the defect utilizing an interpolation axial flap and a staged reconstruction.  A telfa template was made of the defect.  This telfa template was then used to outline the melolabial interpolation flap.  The donor area for the pedicle flap was then injected with anesthesia.  The flap was excised through the skin and subcutaneous tissue down to the layer of the underlying musculature.  The pedicle flap was carefully excised within this deep plane to maintain its blood supply.  The edges of the donor site were undermined.   The donor site was closed in a primary fashion.  The pedicle was then rotated into position and sutured.  Once the tube was sutured into place, adequate blood supply was confirmed with blanching and refill.  The pedicle was then wrapped with xeroform gauze and dressed appropriately with a telfa and gauze bandage to ensure continued blood supply and protect the attached pedicle. Manual Repair Warning Statement: We plan on removing the manually selected variable below in favor of our much easier automatic structured text blocks found in the previous tab. We decided to do this to help make the flow better and give you the full power of structured data. Manual selection is never going to be ideal in our platform and I would encourage you to avoid using manual selection from this point on, especially since I will be sunsetting this feature. It is important that you do one of two things with the customized text below. First, you can save all of the text in a word file so you can have it for future reference. Second, transfer the text to the appropriate area in the Library tab. Lastly, if there is a flap or graft type which we do not have you need to let us know right away so I can add it in before the variable is hidden. No need to panic, we plan to give you roughly 6 months to make the change. Consent (Marginal Mandibular)/Introductory Paragraph: The rationale for Mohs was explained to the patient and consent was obtained. The risks, benefits and alternatives to therapy were discussed in detail. Specifically, the risks of damage to the marginal mandibular branch of the facial nerve, infection, scarring, bleeding, prolonged wound healing, incomplete removal, allergy to anesthesia, and recurrence were addressed. Prior to the procedure, the treatment site was clearly identified and confirmed by the patient. All components of Universal Protocol/PAUSE Rule completed. Epidermal Closure Graft Donor Site (Optional): simple interrupted Consent (Spinal Accessory)/Introductory Paragraph: The rationale for Mohs was explained to the patient and consent was obtained. The risks, benefits and alternatives to therapy were discussed in detail. Specifically, the risks of damage to the spinal accessory nerve, infection, scarring, bleeding, prolonged wound healing, incomplete removal, allergy to anesthesia, and recurrence were addressed. Prior to the procedure, the treatment site was clearly identified and confirmed by the patient. All components of Universal Protocol/PAUSE Rule completed. Mastoid Interpolation Flap Text: A decision was made to reconstruct the defect utilizing an interpolation axial flap and a staged reconstruction.  A telfa template was made of the defect.  This telfa template was then used to outline the mastoid interpolation flap.  The donor area for the pedicle flap was then injected with anesthesia.  The flap was excised through the skin and subcutaneous tissue down to the layer of the underlying musculature.  The pedicle flap was carefully excised within this deep plane to maintain its blood supply.  The edges of the donor site were undermined.   The donor site was closed in a primary fashion.  The pedicle was then rotated into position and sutured.  Once the tube was sutured into place, adequate blood supply was confirmed with blanching and refill.  The pedicle was then wrapped with xeroform gauze and dressed appropriately with a telfa and gauze bandage to ensure continued blood supply and protect the attached pedicle. Island Pedicle Flap With Canthal Suspension Text: The defect edges were debeveled with a #15 scalpel blade.  Given the location of the defect, shape of the defect and the proximity to free margins an island pedicle advancement flap was deemed most appropriate.  Using a sterile surgical marker, an appropriate advancement flap was drawn incorporating the defect, outlining the appropriate donor tissue and placing the expected incisions within the relaxed skin tension lines where possible. The area thus outlined was incised deep to adipose tissue with a #15 scalpel blade.  The skin margins were undermined to an appropriate distance in all directions around the primary defect and laterally outward around the island pedicle utilizing iris scissors.  There was minimal undermining beneath the pedicle flap. A suspension suture was placed in the canthal tendon to prevent tension and prevent ectropion. Surgical Defect Length In Cm (Optional): 1.2 Purse String (Simple) Text: Given the location of the defect and the characteristics of the surrounding skin a purse string closure was deemed most appropriate.  Undermining was performed circumfirentially around the surgical defect.  A purse string suture was then placed and tightened. Subsequent Stages Histo Method Verbiage: Using a similar technique to that described above, a thin layer of tissue was removed from all areas where tumor was visible on the previous stage.  The tissue was again oriented, mapped, dyed, and processed as above. Spiral Flap Text: The defect edges were debeveled with a #15 scalpel blade.  Given the location of the defect, shape of the defect and the proximity to free margins a spiral flap was deemed most appropriate.  Using a sterile surgical marker, an appropriate rotation flap was drawn incorporating the defect and placing the expected incisions within the relaxed skin tension lines where possible. The area thus outlined was incised deep to adipose tissue with a #15 scalpel blade.  The skin margins were undermined to an appropriate distance in all directions utilizing iris scissors. Surgical Defect Length In Cm (Optional): 0.8 Cheiloplasty (Complex) Text: A decision was made to reconstruct the defect with a  cheiloplasty.  The defect was undermined extensively.  Additional obicularis oris muscle was excised with a 15 blade scalpel.  The defect was converted into a full thickness wedge to facilite a better cosmetic result.  Small vessels were then tied off with 5-0 monocyrl. The obicularis oris, superficial fascia, adipose and dermis were then reapproximated.  After the deeper layers were approximated the epidermis was reapproximated with particular care given to realign the vermilion border. No Residual Tumor Seen Histology Text: There were no malignant cells seen in the sections examined. Consent (Lip)/Introductory Paragraph: The rationale for Mohs was explained to the patient and consent was obtained. The risks, benefits and alternatives to therapy were discussed in detail. Specifically, the risks of lip deformity, changes in the oral aperture, infection, scarring, bleeding, prolonged wound healing, incomplete removal, allergy to anesthesia, nerve injury and recurrence were addressed. Prior to the procedure, the treatment site was clearly identified and confirmed by the patient. All components of Universal Protocol/PAUSE Rule completed. Closure 2 Information: This tab is for additional flaps and grafts, including complex repair and grafts and complex repair and flaps. You can also specify a different location for the additional defect, if the location is the same you do not need to select a new one. We will insert the automated text for the repair you select below just as we do for solitary flaps and grafts. Please note that at this time if you select a location with a different insurance zone you will need to override the ICD10 and CPT if appropriate. Area H Indication Text: Tumors in this location are included in Area H (eyelids, eyebrows, nose, lips, chin, ear, pre-auricular, post-auricular, temple, genitalia, hands, feet, ankles and areola).  Tissue conservation is critical in these anatomic locations. Localized Dermabrasion With Wire Brush Text: The patient was draped in routine manner.  Localized dermabrasion using 3 x 17 mm wire brush was performed in routine manner to papillary dermis. This spot dermabrasion is being performed to complete skin cancer reconstruction. It also will eliminate the other sun damaged precancerous cells that are known to be part of the regional effect of a lifetime's worth of sun exposure. This localized dermabrasion is therapeutic and should not be considered cosmetic in any regard. Keystone Flap Text: The defect edges were debeveled with a #15 scalpel blade.  Given the location of the defect, shape of the defect a keystone flap was deemed most appropriate.  Using a sterile surgical marker, an appropriate keystone flap was drawn incorporating the defect, outlining the appropriate donor tissue and placing the expected incisions within the relaxed skin tension lines where possible. The area thus outlined was incised deep to adipose tissue with a #15 scalpel blade.  The skin margins were undermined to an appropriate distance in all directions around the primary defect and laterally outward around the flap utilizing iris scissors. Wound Care: Aquaphor Suturegard Body: The suture ends were repeatedly re-tightened and re-clamped to achieve the desired tissue expansion. Split-Thickness Skin Graft Text: The defect edges were debeveled with a #15 scalpel blade.  Given the location of the defect, shape of the defect and the proximity to free margins a split thickness skin graft was deemed most appropriate.  Using a sterile surgical marker, the primary defect shape was transferred to the donor site. The split thickness graft was then harvested.  The skin graft was then placed in the primary defect and oriented appropriately. Melolabial Transposition Flap Text: The defect edges were debeveled with a #15 scalpel blade.  Given the location of the defect and the proximity to free margins a melolabial flap was deemed most appropriate.  Using a sterile surgical marker, an appropriate melolabial transposition flap was drawn incorporating the defect.    The area thus outlined was incised deep to adipose tissue with a #15 scalpel blade.  The skin margins were undermined to an appropriate distance in all directions utilizing iris scissors.

## 2023-12-15 DIAGNOSIS — M54.42 CHRONIC LEFT-SIDED LOW BACK PAIN WITH LEFT-SIDED SCIATICA: Primary | ICD-10-CM

## 2023-12-15 DIAGNOSIS — G89.29 CHRONIC LEFT-SIDED LOW BACK PAIN WITH LEFT-SIDED SCIATICA: Primary | ICD-10-CM

## 2023-12-15 RX ORDER — OXYCODONE HYDROCHLORIDE 10 MG/1
10 TABLET ORAL EVERY 6 HOURS PRN
Qty: 120 TABLET | Refills: 0 | Status: SHIPPED | OUTPATIENT
Start: 2023-12-15 | End: 2024-01-14

## 2023-12-15 NOTE — TELEPHONE ENCOUNTER
Medication and Quantity requested:   oxyCODONE HCl (OXY-IR) 10 MG immediate release tablet [[id number]]  ENDED            Last Visit    11/16/023    Pharmacy and phone number updated in EPIC:  yes      Matt Monroy on [facility]

## 2024-01-08 ENCOUNTER — PATIENT MESSAGE (OUTPATIENT)
Dept: ENT CLINIC | Age: 64
End: 2024-01-08

## 2024-01-09 DIAGNOSIS — G89.29 CHRONIC LEFT-SIDED LOW BACK PAIN WITH LEFT-SIDED SCIATICA: ICD-10-CM

## 2024-01-09 DIAGNOSIS — M54.42 CHRONIC LEFT-SIDED LOW BACK PAIN WITH LEFT-SIDED SCIATICA: ICD-10-CM

## 2024-01-09 RX ORDER — OXYCODONE HYDROCHLORIDE 10 MG/1
10 TABLET ORAL EVERY 6 HOURS PRN
Qty: 120 TABLET | Refills: 0 | Status: SHIPPED | OUTPATIENT
Start: 2024-01-09 | End: 2024-02-08

## 2024-01-09 NOTE — TELEPHONE ENCOUNTER
Lov 11/16/23  Led 120 0 12/15/23 Medication:   Requested Prescriptions     Pending Prescriptions Disp Refills    oxyCODONE HCl (OXY-IR) 10 MG immediate release tablet 120 tablet 0     Sig: Take 1 tablet by mouth every 6 hours as needed for Pain for up to 30 days. Intended supply: 30 days Max Daily Amount: 40 mg        Last Filled:      Patient Phone Number: [phone] (home)     Last appt: 11/16/2023   Next appt: 2/20/2024    Last OARRS:       9/28/2023     4:07 PM   RX Monitoring   Periodic Controlled Substance Monitoring No signs of potential drug abuse or diversion identified.

## 2024-01-09 NOTE — TELEPHONE ENCOUNTER
From: Jeremiah Nunez  To: Dr. Donald Abreu  Sent: 1/8/2024 7:56 PM EST  Subject: Fluid in ears    Im have problems with my sinuses which seems to be causing and building up fluid in my ears like always this time of year it just seems to be more frequent. Hoping I dont need to come in as Dr. Abreu is familiar with this happening to me. There not clogged yet but headed that way. Hoping he can call in medication so I dont need to come in and I dont need to have tubes put in again but if thats what it takes then fine. Please call with any questions.    Jeremiah Nunez  [phone]

## 2024-01-09 NOTE — TELEPHONE ENCOUNTER
Spoke to pt, Flonase, Claritin and Ocean nasal spray was the last medication Dr Abreu prescribed, advised pt he could get the Flonase and Claritin and Ocean nasal spray over the counter, pt stated he will try that and see if it helps, pt stated he will call back if he continues to have issues.

## 2024-01-14 DIAGNOSIS — J43.9 PULMONARY EMPHYSEMA, UNSPECIFIED EMPHYSEMA TYPE (HCC): ICD-10-CM

## 2024-01-15 NOTE — TELEPHONE ENCOUNTER
Medication:   Requested Prescriptions     Pending Prescriptions Disp Refills    Budeson-Glycopyrrol-Formoterol 160-9-4.8 MCG/ACT AERO 2 each 0     Sig: Inhale 2 puffs into the lungs in the morning and at bedtime        Last Filled:  9/13/23    Patient Phone Number: [phone] (home)     Last appt: 11/16/2023   Next appt: 2/20/2024    Last OARRS:       1/9/2024     1:08 PM   RX Monitoring   Periodic Controlled Substance Monitoring No signs of potential drug abuse or diversion identified.

## 2024-01-15 NOTE — TELEPHONE ENCOUNTER
Nora Nova Patient Age: 26 year old  MESSAGE: Interpreting service used: No    Insurance on file confirmed with caller: Yes    IM/FP- Symptom-  Adult-  Red Symptom-       Waking up in the middle of the night hard to breath due to congestion    Patient schedule to see Bee Polanco on 1/16/24 at 4:30 pm.  Message confirm with caller.    Is the patient currently having the symptom at the time of the call? NO-     Caller connected to triage- Yes- [place]- Connect call to Triage queue- Route message to provider's clinical support pool    Message read back to caller for accuracy: Yes       ALLERGIES:  Penicillins  Current Outpatient Medications   Medication Sig Dispense Refill    albuterol 108 (90 Base) MCG/ACT inhaler INHALE 2 PUFFS BY MOUTH EVERY 4 HOURS AS NEEDED FOR SHORTNESS OF BREATH FOR WHEEZING 9 g 3    Tri-Lo-Ann Marie 0.18/0.215/0.25 MG-25 MCG tablet TAKE 1 TABLET DAILY 84 tablet 3    Spacer/Aero-Holding Chambers (EQ Space Chamber Anti-Static) Device USE AS DIRECTED      tobramycin (TOBREX) 0.3 % ophthalmic solution Instill 2 drops to affected eye(s) four times daily for 5 days 1 each 0    fluticasone-salmeterol 250-50 MCG/ACT inhaler Inhale 1 puff into the lungs in the morning and 1 puff in the evening. 1 each 11    Spacer/Aero-Hold Chamber Bags [facility] to determine type based on insurance 1 each 1    albuterol (VENTOLIN) (2.5 MG/3ML) 0.083% nebulizer solution Take 3 mLs by nebulization every 6 hours as needed for Shortness of Breath. 75 mL 0    cholecalciferol 1.25 mg (50,000 units) tablet Take 1 tablet by mouth 1 day a week. 12 tablet 0    fluticasone-salmeterol (Advair Diskus) 250-50 MCG/ACT inhaler Inhale 1 puff into the lungs in the morning and 1 puff in the evening. 3 each 1    levothyroxine 75 MCG tablet Take 1 tablet by mouth daily. 90 tablet 3     No current facility-administered medications for this visit.     PHARMACY to use:       [facility] [address] [place] [address]  Андрей Radha from [address] [facility],[address] [place] re: MRI for 10-17  Jamshidameefelipe Garcia will contact and explain that w/o [facility] approval on the C-9, he will have to use medical insurance or resched. Also:   [address] [facility] will contact patient with the status of the C-9 today 34  [address] [facility] [address]  [facility] [address]  Phone: [phone] Fax: [phone]        Pharmacy preference(s) on file:   [facility] [address] [place], [place] - [address] [facility] [address]  [address] [facility] [address]  [facility] [address]  Phone: [phone] Fax: [phone]      CALL BACK INFO: Ok to leave response (including medical information) on answering machine      PCP: Pipe Dee MD         INS: Payor: [employer] / Plan: CHOICE PLUS/2400 / Product Type: PPO MISC   PATIENT ADDRESS:  [address]   [facility] [address]

## 2024-02-07 DIAGNOSIS — M54.42 CHRONIC LEFT-SIDED LOW BACK PAIN WITH LEFT-SIDED SCIATICA: ICD-10-CM

## 2024-02-07 DIAGNOSIS — G89.29 CHRONIC LEFT-SIDED LOW BACK PAIN WITH LEFT-SIDED SCIATICA: ICD-10-CM

## 2024-02-07 RX ORDER — OXYCODONE HYDROCHLORIDE 10 MG/1
10 TABLET ORAL EVERY 6 HOURS PRN
Qty: 120 TABLET | Refills: 0 | Status: SHIPPED | OUTPATIENT
Start: 2024-02-07 | End: 2024-03-08

## 2024-02-07 NOTE — TELEPHONE ENCOUNTER
Medication:   Requested Prescriptions     Pending Prescriptions Disp Refills    oxyCODONE HCl (OXY-IR) 10 MG immediate release tablet 120 tablet 0     Sig: Take 1 tablet by mouth every 6 hours as needed for Pain for up to 30 days. Intended supply: 30 days Max Daily Amount: 40 mg        Last Filled:  01/09/2024    Patient Phone Number: [phone] (home)     Last appt: 11/16/2023   Next appt: 2/20/2024    Last OARRS:       1/9/2024     1:08 PM   RX Monitoring   Periodic Controlled Substance Monitoring No signs of potential drug abuse or diversion identified.

## 2024-02-21 ENCOUNTER — PATIENT MESSAGE (OUTPATIENT)
Dept: ENT CLINIC | Age: 64
End: 2024-02-21

## 2024-02-22 RX ORDER — ALBUTEROL SULFATE 90 UG/1
2 AEROSOL, METERED RESPIRATORY (INHALATION) 4 TIMES DAILY PRN
Qty: 54 G | Refills: 1 | Status: SHIPPED | OUTPATIENT
Start: 2024-02-22 | End: 2024-02-23 | Stop reason: SDUPTHER

## 2024-02-22 RX ORDER — OXYMETAZOLINE HYDROCHLORIDE 0.05 G/100ML
2 SPRAY NASAL 2 TIMES DAILY
Qty: 1 EACH | Refills: 0 | Status: SHIPPED | OUTPATIENT
Start: 2024-02-22 | End: 2024-02-25

## 2024-02-22 RX ORDER — PSEUDOEPHEDRINE HCL 30 MG
30 TABLET ORAL EVERY 6 HOURS PRN
Qty: 30 TABLET | Refills: 0 | Status: SHIPPED | OUTPATIENT
Start: 2024-02-22 | End: 2025-02-21

## 2024-02-22 NOTE — TELEPHONE ENCOUNTER
From: Jeremiah Nunez  To: Dr. Donald Abreu  Sent: 2/21/2024 7:20 PM EST  Subject: Fluid in ears    Flonase, ocean spray and sudafed or ? tablets cant remember the 2 options for tablets also took while flying, are what Dr. Abreu perscribed and suggested. All help/ Can he call me in any of these in perscription strength available to [facility] Arreola.  Flonase probably cheaper perscribed than over the counter maybe sudafed as well. Please inform

## 2024-02-23 RX ORDER — ALBUTEROL SULFATE 90 UG/1
2 AEROSOL, METERED RESPIRATORY (INHALATION) 4 TIMES DAILY PRN
Qty: 54 G | Refills: 1 | Status: SHIPPED | OUTPATIENT
Start: 2024-02-23

## 2024-02-23 NOTE — TELEPHONE ENCOUNTER
Last appointment:  10/20/2023    Next appointment:  Visit date not found    Refill sent yesterday failed to go to pharmacy.

## 2024-03-03 DIAGNOSIS — M54.42 CHRONIC LEFT-SIDED LOW BACK PAIN WITH LEFT-SIDED SCIATICA: ICD-10-CM

## 2024-03-03 DIAGNOSIS — G89.29 CHRONIC LEFT-SIDED LOW BACK PAIN WITH LEFT-SIDED SCIATICA: ICD-10-CM

## 2024-03-04 RX ORDER — OXYCODONE HYDROCHLORIDE 10 MG/1
10 TABLET ORAL EVERY 6 HOURS PRN
Qty: 120 TABLET | Refills: 0 | OUTPATIENT
Start: 2024-03-04 | End: 2024-04-03

## 2024-03-04 NOTE — TELEPHONE ENCOUNTER
Lov 11/16/2023  Lrf 120, 0, 02/07/2024    Medication:   Requested Prescriptions     Pending Prescriptions Disp Refills    oxyCODONE HCl (OXY-IR) 10 MG immediate release tablet 120 tablet 0     Sig: Take 1 tablet by mouth every 6 hours as needed for Pain for up to 30 days. Intended supply: 30 days Max Daily Amount: 40 mg        Last Filled:      Patient Phone Number: [phone] (home)     Last appt: 11/16/2023   Next appt: 3/14/2024    Last OARRS:       1/9/2024     1:08 PM   RX Monitoring   Periodic Controlled Substance Monitoring No signs of potential drug abuse or diversion identified.

## 2024-03-04 NOTE — TELEPHONE ENCOUNTER
Per his OARRS report this was last filled on 2/13.  Which means it is not due for another 10 days.  He can send it in at the same time next week and I will refill it at that time.   37

## 2024-03-07 DIAGNOSIS — G89.29 CHRONIC LEFT-SIDED LOW BACK PAIN WITH LEFT-SIDED SCIATICA: ICD-10-CM

## 2024-03-07 DIAGNOSIS — M54.42 CHRONIC LEFT-SIDED LOW BACK PAIN WITH LEFT-SIDED SCIATICA: ICD-10-CM

## 2024-03-07 RX ORDER — OXYCODONE HYDROCHLORIDE 10 MG/1
10 TABLET ORAL EVERY 6 HOURS PRN
Qty: 120 TABLET | Refills: 0 | OUTPATIENT
Start: 2024-03-07 | End: 2024-04-06

## 2024-03-08 ENCOUNTER — TELEPHONE (OUTPATIENT)
Dept: FAMILY MEDICINE CLINIC | Age: 64
End: 2024-03-08

## 2024-03-08 NOTE — TELEPHONE ENCOUNTER
Patient called to check on Rx and then realized that  did reply to his message in my chart. Patient said he will continue to communicated via My Chart with Dr Rudd.

## 2024-03-09 ENCOUNTER — PATIENT MESSAGE (OUTPATIENT)
Dept: FAMILY MEDICINE CLINIC | Age: 64
End: 2024-03-09

## 2024-03-09 DIAGNOSIS — G89.29 CHRONIC LEFT-SIDED LOW BACK PAIN WITH LEFT-SIDED SCIATICA: ICD-10-CM

## 2024-03-09 DIAGNOSIS — M54.42 CHRONIC LEFT-SIDED LOW BACK PAIN WITH LEFT-SIDED SCIATICA: ICD-10-CM

## 2024-03-11 ENCOUNTER — TELEPHONE (OUTPATIENT)
Dept: FAMILY MEDICINE CLINIC | Age: 64
End: 2024-03-11

## 2024-03-11 RX ORDER — OXYCODONE HYDROCHLORIDE 10 MG/1
10 TABLET ORAL EVERY 6 HOURS PRN
Qty: 120 TABLET | Refills: 0 | Status: SHIPPED | OUTPATIENT
Start: 2024-03-11 | End: 2024-04-10

## 2024-03-11 NOTE — TELEPHONE ENCOUNTER
Patient called and has an appt on 03/14/2024 but he is still in [place]  He was wanting to have a VV instead.     Patient stated he sent Dr Rudd a my chart message about medication refill

## 2024-03-14 ENCOUNTER — PATIENT MESSAGE (OUTPATIENT)
Dept: FAMILY MEDICINE CLINIC | Age: 64
End: 2024-03-14

## 2024-03-14 RX ORDER — AZITHROMYCIN 250 MG/1
TABLET, FILM COATED ORAL
Qty: 6 TABLET | Refills: 0 | Status: SHIPPED | OUTPATIENT
Start: 2024-03-14 | End: 2024-03-24

## 2024-03-18 RX ORDER — AZITHROMYCIN 250 MG/1
TABLET, FILM COATED ORAL
Qty: 6 TABLET | Refills: 0 | Status: SHIPPED | OUTPATIENT
Start: 2024-03-18 | End: 2024-03-28

## 2024-03-28 DIAGNOSIS — J43.9 PULMONARY EMPHYSEMA, UNSPECIFIED EMPHYSEMA TYPE (HCC): ICD-10-CM

## 2024-04-01 RX ORDER — ALBUTEROL SULFATE 90 UG/1
2 AEROSOL, METERED RESPIRATORY (INHALATION) 4 TIMES DAILY PRN
Qty: 54 G | Refills: 1 | Status: SHIPPED | OUTPATIENT
Start: 2024-04-01

## 2024-04-02 RX ORDER — FLUTICASONE FUROATE, UMECLIDINIUM BROMIDE AND VILANTEROL TRIFENATATE 100; 62.5; 25 UG/1; UG/1; UG/1
1 POWDER RESPIRATORY (INHALATION) DAILY
Qty: 60 EACH | Refills: 5 | Status: SHIPPED | OUTPATIENT
Start: 2024-04-02

## 2024-04-02 NOTE — TELEPHONE ENCOUNTER
Medication:   Requested Prescriptions     Pending Prescriptions Disp Refills    Budeson-Glycopyrrol-Formoterol 160-9-4.8 MCG/ACT AERO 2 each 5     Sig: Inhale 2 puffs into the lungs in the morning and at bedtime        Last Filled:  01/15/2024    Patient Phone Number: [phone] (home)     Last appt: 11/16/2023   Next appt: 4/15/2024    Last OARRS:       3/4/2024     6:14 PM   RX Monitoring   Periodic Controlled Substance Monitoring No signs of potential drug abuse or diversion identified.

## 2024-04-05 DIAGNOSIS — G89.29 CHRONIC LEFT-SIDED LOW BACK PAIN WITH LEFT-SIDED SCIATICA: ICD-10-CM

## 2024-04-05 DIAGNOSIS — E78.5 HYPERLIPIDEMIA, UNSPECIFIED HYPERLIPIDEMIA TYPE: ICD-10-CM

## 2024-04-05 DIAGNOSIS — I10 ESSENTIAL HYPERTENSION: ICD-10-CM

## 2024-04-05 DIAGNOSIS — M54.42 CHRONIC LEFT-SIDED LOW BACK PAIN WITH LEFT-SIDED SCIATICA: ICD-10-CM

## 2024-04-08 RX ORDER — HYDROCHLOROTHIAZIDE 12.5 MG/1
12.5 TABLET ORAL DAILY
Qty: 90 TABLET | Refills: 1 | Status: SHIPPED | OUTPATIENT
Start: 2024-04-08

## 2024-04-08 RX ORDER — ATORVASTATIN CALCIUM 10 MG/1
TABLET, FILM COATED ORAL
Qty: 90 TABLET | Refills: 1 | Status: SHIPPED | OUTPATIENT
Start: 2024-04-08

## 2024-04-08 RX ORDER — AMLODIPINE BESYLATE 5 MG/1
TABLET ORAL
Qty: 90 TABLET | Refills: 1 | Status: SHIPPED | OUTPATIENT
Start: 2024-04-08

## 2024-04-08 RX ORDER — OXYCODONE HYDROCHLORIDE 10 MG/1
10 TABLET ORAL EVERY 6 HOURS PRN
Qty: 120 TABLET | Refills: 0 | Status: SHIPPED | OUTPATIENT
Start: 2024-04-08 | End: 2024-05-08

## 2024-04-08 NOTE — TELEPHONE ENCOUNTER
Medication:   Requested Prescriptions     Pending Prescriptions Disp Refills    hydroCHLOROthiazide 12.5 MG tablet 90 tablet 3     Sig: Take 1 tablet by mouth daily    atorvastatin (LIPITOR) 10 MG tablet 90 tablet 2     Sig: TAKE 1 TABLET BY MOUTH EVERY DAY    amLODIPine (NORVASC) 5 MG tablet 90 tablet 2     Sig: TAKE 1 TABLET BY MOUTH EVERY DAY    oxyCODONE HCl (OXY-IR) 10 MG immediate release tablet 120 tablet 0     Sig: Take 1 tablet by mouth every 6 hours as needed for Pain for up to 30 days. Intended supply: 30 days Max Daily Amount: 40 mg        Last Filled: 12/19/2023,03/11/2024      Patient Phone Number: [phone] (home)     Last appt: 11/16/2023   Next appt: 4/15/2024    Last OARRS:       3/4/2024     6:14 PM   RX Monitoring   Periodic Controlled Substance Monitoring No signs of potential drug abuse or diversion identified.

## 2024-05-06 DIAGNOSIS — M54.42 CHRONIC LEFT-SIDED LOW BACK PAIN WITH LEFT-SIDED SCIATICA: ICD-10-CM

## 2024-05-06 DIAGNOSIS — G89.29 CHRONIC LEFT-SIDED LOW BACK PAIN WITH LEFT-SIDED SCIATICA: ICD-10-CM

## 2024-05-06 RX ORDER — OXYCODONE HYDROCHLORIDE 10 MG/1
10 TABLET ORAL EVERY 6 HOURS PRN
Qty: 120 TABLET | Refills: 0 | Status: SHIPPED | OUTPATIENT
Start: 2024-05-06 | End: 2024-06-05

## 2024-05-06 SDOH — HEALTH STABILITY: PHYSICAL HEALTH: ON AVERAGE, HOW MANY DAYS PER WEEK DO YOU ENGAGE IN MODERATE TO STRENUOUS EXERCISE (LIKE A BRISK WALK)?: 0 DAYS

## 2024-05-06 SDOH — ECONOMIC STABILITY: HOUSING INSECURITY
IN THE LAST 12 MONTHS, WAS THERE A TIME WHEN YOU DID NOT HAVE A STEADY PLACE TO SLEEP OR SLEPT IN A SHELTER (INCLUDING NOW)?: NO

## 2024-05-06 SDOH — ECONOMIC STABILITY: FOOD INSECURITY: WITHIN THE PAST 12 MONTHS, YOU WORRIED THAT YOUR FOOD WOULD RUN OUT BEFORE YOU GOT MONEY TO BUY MORE.: SOMETIMES TRUE

## 2024-05-06 SDOH — ECONOMIC STABILITY: TRANSPORTATION INSECURITY
IN THE PAST 12 MONTHS, HAS LACK OF TRANSPORTATION KEPT YOU FROM MEETINGS, WORK, OR FROM GETTING THINGS NEEDED FOR DAILY LIVING?: NO

## 2024-05-06 SDOH — ECONOMIC STABILITY: INCOME INSECURITY: HOW HARD IS IT FOR YOU TO PAY FOR THE VERY BASICS LIKE FOOD, HOUSING, MEDICAL CARE, AND HEATING?: HARD

## 2024-05-06 SDOH — ECONOMIC STABILITY: FOOD INSECURITY: WITHIN THE PAST 12 MONTHS, THE FOOD YOU BOUGHT JUST DIDN'T LAST AND YOU DIDN'T HAVE MONEY TO GET MORE.: NEVER TRUE

## 2024-05-06 SDOH — HEALTH STABILITY: PHYSICAL HEALTH: ON AVERAGE, HOW MANY MINUTES DO YOU ENGAGE IN EXERCISE AT THIS LEVEL?: 10 MIN

## 2024-05-06 ASSESSMENT — PATIENT HEALTH QUESTIONNAIRE - PHQ9
SUM OF ALL RESPONSES TO PHQ QUESTIONS 1-9: 14
2. FEELING DOWN, DEPRESSED OR HOPELESS: MORE THAN HALF THE DAYS
6. FEELING BAD ABOUT YOURSELF - OR THAT YOU ARE A FAILURE OR HAVE LET YOURSELF OR YOUR FAMILY DOWN: MORE THAN HALF THE DAYS
10. IF YOU CHECKED OFF ANY PROBLEMS, HOW DIFFICULT HAVE THESE PROBLEMS MADE IT FOR YOU TO DO YOUR WORK, TAKE CARE OF THINGS AT HOME, OR GET ALONG WITH OTHER PEOPLE: SOMEWHAT DIFFICULT
1. LITTLE INTEREST OR PLEASURE IN DOING THINGS: MORE THAN HALF THE DAYS
8. MOVING OR SPEAKING SO SLOWLY THAT OTHER PEOPLE COULD HAVE NOTICED. OR THE OPPOSITE, BEING SO FIGETY OR RESTLESS THAT YOU HAVE BEEN MOVING AROUND A LOT MORE THAN USUAL: NOT AT ALL
9. THOUGHTS THAT YOU WOULD BE BETTER OFF DEAD, OR OF HURTING YOURSELF: NOT AT ALL
SUM OF ALL RESPONSES TO PHQ QUESTIONS 1-9: 14
SUM OF ALL RESPONSES TO PHQ QUESTIONS 1-9: 14
SUM OF ALL RESPONSES TO PHQ9 QUESTIONS 1 & 2: 4
SUM OF ALL RESPONSES TO PHQ QUESTIONS 1-9: 14
4. FEELING TIRED OR HAVING LITTLE ENERGY: MORE THAN HALF THE DAYS
7. TROUBLE CONCENTRATING ON THINGS, SUCH AS READING THE NEWSPAPER OR WATCHING TELEVISION: MORE THAN HALF THE DAYS
5. POOR APPETITE OR OVEREATING: MORE THAN HALF THE DAYS
3. TROUBLE FALLING OR STAYING ASLEEP: MORE THAN HALF THE DAYS

## 2024-05-06 ASSESSMENT — LIFESTYLE VARIABLES
HOW OFTEN DO YOU HAVE A DRINK CONTAINING ALCOHOL: NEVER
HOW OFTEN DO YOU HAVE SIX OR MORE DRINKS ON ONE OCCASION: 1
HOW MANY STANDARD DRINKS CONTAINING ALCOHOL DO YOU HAVE ON A TYPICAL DAY: PATIENT DOES NOT DRINK
HOW MANY STANDARD DRINKS CONTAINING ALCOHOL DO YOU HAVE ON A TYPICAL DAY: 0
HOW OFTEN DO YOU HAVE A DRINK CONTAINING ALCOHOL: 1

## 2024-05-06 NOTE — TELEPHONE ENCOUNTER
Medication:   Requested Prescriptions     Pending Prescriptions Disp Refills    oxyCODONE HCl (OXY-IR) 10 MG immediate release tablet 120 tablet 0     Sig: Take 1 tablet by mouth every 6 hours as needed for Pain for up to 30 days. Intended supply: 30 days Max Daily Amount: 40 mg        Last Filled:  04/08/2024, 120, 0    Patient Phone Number: [phone] (home)     Last appt: 11/16/2023   Next appt: 5/9/2024    Last OARRS:       4/8/2024     2:24 PM   RX Monitoring   Periodic Controlled Substance Monitoring No signs of potential drug abuse or diversion identified.

## 2024-05-09 ENCOUNTER — OFFICE VISIT (OUTPATIENT)
Dept: FAMILY MEDICINE CLINIC | Age: 64
End: 2024-05-09

## 2024-05-09 VITALS
WEIGHT: 210 LBS | OXYGEN SATURATION: 98 % | BODY MASS INDEX: 30.06 KG/M2 | DIASTOLIC BLOOD PRESSURE: 80 MMHG | HEART RATE: 79 BPM | SYSTOLIC BLOOD PRESSURE: 144 MMHG | HEIGHT: 70 IN

## 2024-05-09 DIAGNOSIS — K21.9 GASTROESOPHAGEAL REFLUX DISEASE, UNSPECIFIED WHETHER ESOPHAGITIS PRESENT: ICD-10-CM

## 2024-05-09 DIAGNOSIS — J43.9 PULMONARY EMPHYSEMA, UNSPECIFIED EMPHYSEMA TYPE (HCC): ICD-10-CM

## 2024-05-09 DIAGNOSIS — F11.20 OPIOID DEPENDENCE WITH CURRENT USE (HCC): ICD-10-CM

## 2024-05-09 DIAGNOSIS — F33.2 SEVERE EPISODE OF RECURRENT MAJOR DEPRESSIVE DISORDER, WITHOUT PSYCHOTIC FEATURES (HCC): ICD-10-CM

## 2024-05-09 DIAGNOSIS — I10 ESSENTIAL HYPERTENSION: ICD-10-CM

## 2024-05-09 DIAGNOSIS — E78.00 PURE HYPERCHOLESTEROLEMIA: ICD-10-CM

## 2024-05-09 DIAGNOSIS — B36.0 TINEA VERSICOLOR: ICD-10-CM

## 2024-05-09 DIAGNOSIS — M54.42 CHRONIC LEFT-SIDED LOW BACK PAIN WITH LEFT-SIDED SCIATICA: ICD-10-CM

## 2024-05-09 DIAGNOSIS — G89.29 CHRONIC LEFT-SIDED LOW BACK PAIN WITH LEFT-SIDED SCIATICA: ICD-10-CM

## 2024-05-09 DIAGNOSIS — Z00.00 MEDICARE ANNUAL WELLNESS VISIT, SUBSEQUENT: Primary | ICD-10-CM

## 2024-05-09 DIAGNOSIS — Z12.5 PROSTATE CANCER SCREENING: ICD-10-CM

## 2024-05-09 RX ORDER — SELENIUM SULFIDE 1 G/100ML
SHAMPOO TOPICAL DAILY PRN
Qty: 1 EACH | Refills: 2 | Status: SHIPPED | OUTPATIENT
Start: 2024-05-09

## 2024-05-09 RX ORDER — PANTOPRAZOLE SODIUM 40 MG/1
40 TABLET, DELAYED RELEASE ORAL
Qty: 30 TABLET | Refills: 3 | Status: SHIPPED | OUTPATIENT
Start: 2024-05-09

## 2024-05-09 SDOH — ECONOMIC STABILITY: FOOD INSECURITY: WITHIN THE PAST 12 MONTHS, YOU WORRIED THAT YOUR FOOD WOULD RUN OUT BEFORE YOU GOT MONEY TO BUY MORE.: NEVER TRUE

## 2024-05-09 SDOH — ECONOMIC STABILITY: FOOD INSECURITY: WITHIN THE PAST 12 MONTHS, THE FOOD YOU BOUGHT JUST DIDN'T LAST AND YOU DIDN'T HAVE MONEY TO GET MORE.: NEVER TRUE

## 2024-05-09 SDOH — ECONOMIC STABILITY: INCOME INSECURITY: HOW HARD IS IT FOR YOU TO PAY FOR THE VERY BASICS LIKE FOOD, HOUSING, MEDICAL CARE, AND HEATING?: NOT HARD AT ALL

## 2024-05-09 NOTE — PATIENT INSTRUCTIONS
when blood flow is completely blocked. A high-fat diet, smoking, and other factors increase the risk of heart disease.  Your doctor has found that you have a chance of having heart disease. A heart-healthy lifestyle can help keep your heart healthy and prevent heart disease. This lifestyle includes eating healthy, being active, staying at a weight that's healthy for you, and not smoking or using tobacco. It also includes taking medicines as directed, managing other health conditions, and trying to get a healthy amount of sleep.  Follow-up care is a key part of your treatment and safety. Be sure to make and go to all appointments, and call your doctor if you are having problems. It's also a good idea to know your test results and keep a list of the medicines you take.  How can you care for yourself at home?  Diet    Use less salt when you cook and eat. This helps lower your blood pressure. Taste food before salting. Add only a little salt when you think you need it. With time, your taste buds will adjust to less salt.     Eat fewer snack items, fast foods, canned soups, and other high-salt, high-fat, processed foods.     Read food labels and try to avoid saturated and trans fats. They increase your risk of heart disease by raising cholesterol levels.     Limit the amount of solid fat--butter, margarine, and shortening--you eat. Use olive, peanut, or canola oil when you cook. Bake, broil, and steam foods instead of frying them.     Eat a variety of fruit and vegetables every day. Dark green, deep orange, red, or yellow fruits and vegetables are especially good for you. Examples include spinach, carrots, peaches, and berries.     Foods high in fiber can reduce your cholesterol and provide important vitamins and minerals. High-fiber foods include whole-grain cereals and breads, oatmeal, beans, brown rice, citrus fruits, and apples.     Eat lean proteins. Heart-healthy proteins include seafood, lean meats and poultry,

## 2024-05-09 NOTE — PROGRESS NOTES
Medicare Annual Wellness Visit    Jeremiah Nunez is here for Medicare AWV    Assessment & Plan     Medicare annual wellness visit, subsequent  Return 1 year  Essential hypertension  -     CBC with Auto Differential; Future  -     Comprehensive Metabolic Panel; Future  -     Lipid Panel; Future  -     TSH with Reflex; Future  Borderline  Continue current treatment.  Home blood pressure checks.  Return 3 months  Pulmonary emphysema, unspecified emphysema type (HCC)  Patient states he stopped smoking approximately 3 weeks ago.  He remains on Trelegy.  And albuterol  PCV and RSV recommended to him as noted below  Severe episode of recurrent major depressive disorder, without psychotic features (HCC)  Patient states he has been doing okay and is on no medication  Chronic left-sided low back pain with left-sided sciatica/Opioid dependence with current use (HCC)  OARRS report reviewed and no inconsistencies noted   The patient understands the risks of dependency/addiction with lorazepam and will take as little as possible and discontinue as soon as possible   Prostate cancer screening  -     PSA Screening; Future  -     UT PROSTATE CA SCREENING; CINTHYA    Pure hypercholesterolemia  -     Comprehensive Metabolic Panel; Future  -     Lipid Panel; Future  Await lab to determine stability.  Continue atorvastatin  Gastroesophageal reflux disease, unspecified whether esophagitis present  Trial of  -     pantoprazole (PROTONIX) 40 MG tablet; Take 1 tablet by mouth daily (with breakfast)    Tinea versicolor  Refill  -     selenium sulfide (SELSUN BLUE) 1 % shampoo; Apply topically daily as needed (rash)       Recommendations for Preventive Services Due: see orders and patient instructions/AVS.  Recommended screening schedule for the next 5-10 years is provided to the patient in written form: see Patient Instructions/AVS.    Health Maintenance reviewed with the patient: Shingrix, Tdap, RSV, PCV, and COVID-19 were discussed and

## 2024-06-03 DIAGNOSIS — G89.29 CHRONIC LEFT-SIDED LOW BACK PAIN WITH LEFT-SIDED SCIATICA: ICD-10-CM

## 2024-06-03 DIAGNOSIS — M54.42 CHRONIC LEFT-SIDED LOW BACK PAIN WITH LEFT-SIDED SCIATICA: ICD-10-CM

## 2024-06-03 RX ORDER — OXYCODONE HYDROCHLORIDE 10 MG/1
10 TABLET ORAL EVERY 6 HOURS PRN
Qty: 120 TABLET | Refills: 0 | Status: SHIPPED | OUTPATIENT
Start: 2024-06-03 | End: 2024-07-03

## 2024-06-03 NOTE — TELEPHONE ENCOUNTER
Medication:   Requested Prescriptions     Pending Prescriptions Disp Refills    oxyCODONE HCl (OXY-IR) 10 MG immediate release tablet 120 tablet 0     Sig: Take 1 tablet by mouth every 6 hours as needed for Pain for up to 30 days. Intended supply: 30 days Max Daily Amount: 40 mg        Last Filled:  05/06/2024, 120, 0    Patient Phone Number: [phone] (home)     Last appt: 5/9/2024   Next appt: Visit date not found    Last OARRS:       5/9/2024     5:17 PM   RX Monitoring   Periodic Controlled Substance Monitoring No signs of potential drug abuse or diversion identified.

## 2024-07-02 DIAGNOSIS — M54.42 CHRONIC LEFT-SIDED LOW BACK PAIN WITH LEFT-SIDED SCIATICA: ICD-10-CM

## 2024-07-02 DIAGNOSIS — G89.29 CHRONIC LEFT-SIDED LOW BACK PAIN WITH LEFT-SIDED SCIATICA: ICD-10-CM

## 2024-07-02 RX ORDER — OXYCODONE HYDROCHLORIDE 10 MG/1
10 TABLET ORAL EVERY 6 HOURS PRN
Qty: 120 TABLET | Refills: 0 | Status: SHIPPED | OUTPATIENT
Start: 2024-07-02 | End: 2024-08-01

## 2024-07-02 RX ORDER — FLUTICASONE FUROATE, UMECLIDINIUM BROMIDE AND VILANTEROL TRIFENATATE 100; 62.5; 25 UG/1; UG/1; UG/1
1 POWDER RESPIRATORY (INHALATION) DAILY
Qty: 180 EACH | Refills: 2 | Status: SHIPPED | OUTPATIENT
Start: 2024-07-02

## 2024-07-02 RX ORDER — ALBUTEROL SULFATE 90 UG/1
2 AEROSOL, METERED RESPIRATORY (INHALATION) 4 TIMES DAILY PRN
Qty: 54 G | Refills: 2 | Status: SHIPPED | OUTPATIENT
Start: 2024-07-02

## 2024-07-02 NOTE — TELEPHONE ENCOUNTER
Medication:   Requested Prescriptions     Pending Prescriptions Disp Refills    oxyCODONE HCl (OXY-IR) 10 MG immediate release tablet 120 tablet 0     Sig: Take 1 tablet by mouth every 6 hours as needed for Pain for up to 30 days. Intended supply: 30 days Max Daily Amount: 40 mg        Last Filled:  06/03/2024, 120, 0    Patient Phone Number: [phone] (home)     Last appt: 5/9/2024   Next appt: Visit date not found    Last OARRS:       6/3/2024    12:16 PM   RX Monitoring   Periodic Controlled Substance Monitoring No signs of potential drug abuse or diversion identified.

## 2024-07-08 DIAGNOSIS — I10 ESSENTIAL HYPERTENSION: ICD-10-CM

## 2024-07-08 DIAGNOSIS — Z12.5 PROSTATE CANCER SCREENING: ICD-10-CM

## 2024-07-08 DIAGNOSIS — E78.00 PURE HYPERCHOLESTEROLEMIA: ICD-10-CM

## 2024-07-09 LAB
ALBUMIN SERPL-MCNC: 4.1 G/DL (ref 3.4–5)
ALBUMIN/GLOB SERPL: 1.8 {RATIO} (ref 1.1–2.2)
ALP SERPL-CCNC: 75 U/L (ref 40–129)
ALT SERPL-CCNC: 13 U/L (ref 10–40)
ANION GAP SERPL CALCULATED.3IONS-SCNC: 12 MMOL/L (ref 3–16)
AST SERPL-CCNC: 13 U/L (ref 15–37)
BASOPHILS # BLD: 0.1 K/UL (ref 0–0.2)
BASOPHILS NFR BLD: 0.7 %
BILIRUB SERPL-MCNC: 0.3 MG/DL (ref 0–1)
BUN SERPL-MCNC: 14 MG/DL (ref 7–20)
CALCIUM SERPL-MCNC: 9 MG/DL (ref 8.3–10.6)
CHLORIDE SERPL-SCNC: 104 MMOL/L (ref 99–110)
CHOLEST SERPL-MCNC: 136 MG/DL (ref 0–199)
CO2 SERPL-SCNC: 26 MMOL/L (ref 21–32)
CREAT SERPL-MCNC: 0.6 MG/DL (ref 0.8–1.3)
DEPRECATED RDW RBC AUTO: 14.7 % (ref 12.4–15.4)
EOSINOPHIL # BLD: 0.2 K/UL (ref 0–0.6)
EOSINOPHIL NFR BLD: 1.9 %
GFR SERPLBLD CREATININE-BSD FMLA CKD-EPI: >90 ML/MIN/{1.73_M2}
GLUCOSE SERPL-MCNC: 84 MG/DL (ref 70–99)
HCT VFR BLD AUTO: 44.8 % (ref 40.5–52.5)
HDLC SERPL-MCNC: 42 MG/DL (ref 40–60)
HGB BLD-MCNC: 14.7 G/DL (ref 13.5–17.5)
LDLC SERPL CALC-MCNC: 65 MG/DL
LYMPHOCYTES # BLD: 2.5 K/UL (ref 1–5.1)
LYMPHOCYTES NFR BLD: 21 %
MCH RBC QN AUTO: 30.5 PG (ref 26–34)
MCHC RBC AUTO-ENTMCNC: 32.8 G/DL (ref 31–36)
MCV RBC AUTO: 93.1 FL (ref 80–100)
MONOCYTES # BLD: 0.9 K/UL (ref 0–1.3)
MONOCYTES NFR BLD: 7.3 %
NEUTROPHILS # BLD: 8.1 K/UL (ref 1.7–7.7)
NEUTROPHILS NFR BLD: 69.1 %
PLATELET # BLD AUTO: 292 K/UL (ref 135–450)
PMV BLD AUTO: 9.5 FL (ref 5–10.5)
POTASSIUM SERPL-SCNC: 4.1 MMOL/L (ref 3.5–5.1)
PROT SERPL-MCNC: 6.4 G/DL (ref 6.4–8.2)
PSA SERPL DL<=0.01 NG/ML-MCNC: 1.1 NG/ML (ref 0–4)
RBC # BLD AUTO: 4.81 M/UL (ref 4.2–5.9)
SODIUM SERPL-SCNC: 142 MMOL/L (ref 136–145)
TRIGL SERPL-MCNC: 147 MG/DL (ref 0–150)
TSH SERPL DL<=0.005 MIU/L-ACNC: 2.7 UIU/ML (ref 0.27–4.2)
VLDLC SERPL CALC-MCNC: 29 MG/DL
WBC # BLD AUTO: 11.7 K/UL (ref 4–11)

## 2024-07-29 DIAGNOSIS — M54.42 CHRONIC LEFT-SIDED LOW BACK PAIN WITH LEFT-SIDED SCIATICA: ICD-10-CM

## 2024-07-29 DIAGNOSIS — G89.29 CHRONIC LEFT-SIDED LOW BACK PAIN WITH LEFT-SIDED SCIATICA: ICD-10-CM

## 2024-07-29 DIAGNOSIS — I10 ESSENTIAL HYPERTENSION: ICD-10-CM

## 2024-07-29 DIAGNOSIS — E78.5 HYPERLIPIDEMIA, UNSPECIFIED HYPERLIPIDEMIA TYPE: ICD-10-CM

## 2024-07-29 RX ORDER — OXYCODONE HYDROCHLORIDE 10 MG/1
10 TABLET ORAL EVERY 6 HOURS PRN
Qty: 120 TABLET | Refills: 0 | Status: SHIPPED | OUTPATIENT
Start: 2024-07-29 | End: 2024-07-29 | Stop reason: SDUPTHER

## 2024-07-29 RX ORDER — ATORVASTATIN CALCIUM 10 MG/1
TABLET, FILM COATED ORAL
Qty: 90 TABLET | Refills: 1 | Status: SHIPPED | OUTPATIENT
Start: 2024-07-29

## 2024-07-29 RX ORDER — HYDROCHLOROTHIAZIDE 12.5 MG/1
12.5 TABLET ORAL DAILY
Qty: 90 TABLET | Refills: 1 | Status: SHIPPED | OUTPATIENT
Start: 2024-07-29

## 2024-07-29 RX ORDER — AMLODIPINE BESYLATE 5 MG/1
TABLET ORAL
Qty: 90 TABLET | Refills: 1 | Status: SHIPPED | OUTPATIENT
Start: 2024-07-29

## 2024-07-29 NOTE — TELEPHONE ENCOUNTER
Medication:   Requested Prescriptions     Pending Prescriptions Disp Refills    hydroCHLOROthiazide 12.5 MG tablet 90 tablet 1     Sig: Take 1 tablet by mouth daily    atorvastatin (LIPITOR) 10 MG tablet 90 tablet 1     Sig: TAKE 1 TABLET BY MOUTH EVERY DAY    amLODIPine (NORVASC) 5 MG tablet 90 tablet 1     Sig: TAKE 1 TABLET BY MOUTH EVERY DAY    oxyCODONE HCl (OXY-IR) 10 MG immediate release tablet 120 tablet 0     Sig: Take 1 tablet by mouth every 6 hours as needed for Pain for up to 30 days. Intended supply: 30 days Max Daily Amount: 40 mg       Last Filled:      Patient Phone Number: [phone] (home)     Last appt: 5/9/2024   Next appt: 8/8/2024    Last Labs DM:   Lab Results   Component Value Date/Time    LABA1C 5.6 09/20/2021 11:19 AM     Last Lipid:   Lab Results   Component Value Date/Time    CHOL 136 07/08/2024 08:51 AM    TRIG 147 07/08/2024 08:51 AM    HDL 42 07/08/2024 08:51 AM     Last PSA:   Lab Results   Component Value Date/Time    PSA 1.10 07/08/2024 08:51 AM     Last Thyroid:   Lab Results   Component Value Date/Time    TSH 2.70 07/08/2024 08:51 AM

## 2024-07-29 NOTE — TELEPHONE ENCOUNTER
Medication:   Requested Prescriptions     Pending Prescriptions Disp Refills    oxyCODONE HCl (OXY-IR) 10 MG immediate release tablet 120 tablet 0     Sig: Take 1 tablet by mouth every 6 hours as needed for Pain for up to 30 days. Intended supply: 30 days Max Daily Amount: 40 mg        Last Filled:  07/29/2024, 120, 0    Patient Phone Number: [phone] (home)     Last appt: 5/9/2024   Next appt: 8/8/2024    Last OARRS:       6/3/2024    12:16 PM   RX Monitoring   Periodic Controlled Substance Monitoring No signs of potential drug abuse or diversion identified.

## 2024-07-30 RX ORDER — OXYCODONE HYDROCHLORIDE 10 MG/1
10 TABLET ORAL EVERY 6 HOURS PRN
Qty: 120 TABLET | Refills: 0 | Status: SHIPPED | OUTPATIENT
Start: 2024-07-30 | End: 2024-08-29

## 2024-07-30 NOTE — TELEPHONE ENCOUNTER
Cancel yesterday's prescription at [facility] since he wants this sent to [facility]

## 2024-07-31 ENCOUNTER — TELEPHONE (OUTPATIENT)
Dept: CASE MANAGEMENT | Age: 64
End: 2024-07-31

## 2024-07-31 DIAGNOSIS — Z72.0 TOBACCO ABUSE: Primary | ICD-10-CM

## 2024-07-31 NOTE — TELEPHONE ENCOUNTER
Javier Prasad    This is a friendly reminder that your patient is due for their annual lung screen on 8/23/24.  For your convenience, I have pended the order for the scan.  If you do not agree with the need for the test, please cancel the order and let me know.      Patient will be mailed reminder letter to schedule.      Thank you,     Sherron Quesada  Lung Navigator  [facility]  [phone]    Future Appointments   Date Time Provider Department Center   8/8/2024  5:45 PM Isaac Rudd MD [facility]       Last PulmAppt: 10/2023     Lung Screen Criteria  Age 50-80  Current smoker or quit within the last 15 years  Has => 20 pack year history.

## 2024-08-08 ENCOUNTER — OFFICE VISIT (OUTPATIENT)
Dept: FAMILY MEDICINE CLINIC | Age: 64
End: 2024-08-08

## 2024-08-08 VITALS
OXYGEN SATURATION: 95 % | HEART RATE: 75 BPM | DIASTOLIC BLOOD PRESSURE: 80 MMHG | BODY MASS INDEX: 29.99 KG/M2 | WEIGHT: 209 LBS | SYSTOLIC BLOOD PRESSURE: 150 MMHG

## 2024-08-08 DIAGNOSIS — Z20.828 EXPOSURE TO HERPES SIMPLEX VIRUS (HSV): ICD-10-CM

## 2024-08-08 DIAGNOSIS — J43.9 PULMONARY EMPHYSEMA, UNSPECIFIED EMPHYSEMA TYPE (HCC): ICD-10-CM

## 2024-08-08 DIAGNOSIS — I10 ESSENTIAL HYPERTENSION: ICD-10-CM

## 2024-08-08 DIAGNOSIS — M54.2 NECK PAIN: ICD-10-CM

## 2024-08-08 DIAGNOSIS — M54.42 CHRONIC LEFT-SIDED LOW BACK PAIN WITH LEFT-SIDED SCIATICA: Primary | ICD-10-CM

## 2024-08-08 DIAGNOSIS — G89.29 CHRONIC LEFT-SIDED LOW BACK PAIN WITH LEFT-SIDED SCIATICA: Primary | ICD-10-CM

## 2024-08-08 ASSESSMENT — ENCOUNTER SYMPTOMS
SHORTNESS OF BREATH: 1
COUGH: 0
BACK PAIN: 1
WHEEZING: 0

## 2024-08-08 NOTE — PROGRESS NOTES
Jeremiah Nunez (:  1960) is a 63 y.o. male,Established patient, here for evaluation of the following chief complaint(s):  Follow-up      Assessment & Plan     Chronic left-sided low back pain with left-sided sciatica/Neck pain  OARRS report reviewed and no inconsistencies noted   The patient understands the risks of dependency/addiction with oxycodone and will take as little as possible and discontinue as soon as possible   Pulmonary emphysema, unspecified emphysema type (HCC)  Generally stable.  Continue current treatment and follow-up with pulmonology  Essential hypertension  Blood pressure was mildly elevated tonight.  Patient informed.  He knows he needs to be checking his blood pressure at home which she states he will start doing.  If blood pressure remains elevated he will return in 1 month if normotensive he will return in 3 months  Exposure to herpes simplex virus (HSV)  Patient request below lab  -     HERPES SIMPLEX VIRUS (HSV) I/II ANTIBODIES IGG & IGM ([facility]); Future         Return in about 3 months (around 2024) for htn, OARRS.       Subjective   HPI  Back Pain: persists with same amount of pain  Neck Pain:.persists with pain which is generally unchanged  COPD/SOB: Sees Pulmonary and has been stable.  He remains on medication  HTN: checks occasionally and is 150 range. Asymptomatic. He did not bring his cuff today    Review of Systems   Constitutional: Negative.    Respiratory:  Positive for shortness of breath (with vigorous activity). Negative for cough and wheezing.    Genitourinary:         Wife tested positive for HPV and HSV1.  Both he and wife are asymptomatic   Musculoskeletal:  Positive for arthralgias, back pain, myalgias and neck pain.   Neurological: Negative.    Psychiatric/Behavioral:  Positive for dysphoric mood.           Objective   Physical Exam  Constitutional:       General: He is not in acute distress.     Appearance: He is not ill-appearing,

## 2024-08-09 DIAGNOSIS — Z20.828 EXPOSURE TO HERPES SIMPLEX VIRUS (HSV): ICD-10-CM

## 2024-08-10 ENCOUNTER — PATIENT MESSAGE (OUTPATIENT)
Dept: FAMILY MEDICINE CLINIC | Age: 64
End: 2024-08-10

## 2024-08-11 LAB
HSV1+2 IGG SER IA-ACNC: 0.38 IV
HSV1+2 IGM SER IA-ACNC: 0.59 IV

## 2024-08-12 RX ORDER — AZITHROMYCIN 250 MG/1
TABLET, FILM COATED ORAL
Qty: 6 TABLET | Refills: 0 | Status: SHIPPED | OUTPATIENT
Start: 2024-08-12 | End: 2024-08-22

## 2024-08-19 ENCOUNTER — PATIENT MESSAGE (OUTPATIENT)
Dept: FAMILY MEDICINE CLINIC | Age: 64
End: 2024-08-19

## 2024-08-19 RX ORDER — DOXYCYCLINE HYCLATE 100 MG
100 TABLET ORAL 2 TIMES DAILY
Qty: 20 TABLET | Refills: 0 | Status: SHIPPED | OUTPATIENT
Start: 2024-08-19 | End: 2024-08-29

## 2024-09-03 ENCOUNTER — OFFICE VISIT (OUTPATIENT)
Dept: FAMILY MEDICINE CLINIC | Age: 64
End: 2024-09-03

## 2024-09-03 VITALS
DIASTOLIC BLOOD PRESSURE: 76 MMHG | HEART RATE: 75 BPM | OXYGEN SATURATION: 94 % | SYSTOLIC BLOOD PRESSURE: 140 MMHG | HEIGHT: 70 IN | WEIGHT: 207.6 LBS | BODY MASS INDEX: 29.72 KG/M2

## 2024-09-03 DIAGNOSIS — M54.42 CHRONIC LEFT-SIDED LOW BACK PAIN WITH LEFT-SIDED SCIATICA: ICD-10-CM

## 2024-09-03 DIAGNOSIS — I10 ESSENTIAL HYPERTENSION: ICD-10-CM

## 2024-09-03 DIAGNOSIS — J43.9 PULMONARY EMPHYSEMA, UNSPECIFIED EMPHYSEMA TYPE (HCC): Primary | ICD-10-CM

## 2024-09-03 DIAGNOSIS — G89.29 CHRONIC LEFT-SIDED LOW BACK PAIN WITH LEFT-SIDED SCIATICA: ICD-10-CM

## 2024-09-03 DIAGNOSIS — M54.2 NECK PAIN: ICD-10-CM

## 2024-09-03 RX ORDER — OXYCODONE HYDROCHLORIDE 10 MG/1
10 TABLET ORAL EVERY 6 HOURS PRN
COMMUNITY
Start: 2024-08-06 | End: 2024-09-04 | Stop reason: SDUPTHER

## 2024-09-03 RX ORDER — PREDNISONE 10 MG/1
TABLET ORAL
Qty: 30 TABLET | Refills: 0 | Status: SHIPPED | OUTPATIENT
Start: 2024-09-03 | End: 2024-09-13

## 2024-09-03 ASSESSMENT — ENCOUNTER SYMPTOMS
BACK PAIN: 1
SHORTNESS OF BREATH: 1
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
## 2024-09-04 ENCOUNTER — TELEPHONE (OUTPATIENT)
Dept: FAMILY MEDICINE CLINIC | Age: 64
End: 2024-09-04

## 2024-09-04 ENCOUNTER — PATIENT MESSAGE (OUTPATIENT)
Dept: FAMILY MEDICINE CLINIC | Age: 64
End: 2024-09-04

## 2024-09-04 DIAGNOSIS — M54.2 NECK PAIN: ICD-10-CM

## 2024-09-04 DIAGNOSIS — M54.42 CHRONIC LEFT-SIDED LOW BACK PAIN WITH LEFT-SIDED SCIATICA: Primary | ICD-10-CM

## 2024-09-04 DIAGNOSIS — G89.29 CHRONIC LEFT-SIDED LOW BACK PAIN WITH LEFT-SIDED SCIATICA: Primary | ICD-10-CM

## 2024-09-04 RX ORDER — OXYCODONE HYDROCHLORIDE 10 MG/1
10 TABLET ORAL EVERY 6 HOURS PRN
Qty: 120 TABLET | Refills: 0 | Status: SHIPPED | OUTPATIENT
Start: 2024-09-04 | End: 2024-10-04

## 2024-09-04 NOTE — TELEPHONE ENCOUNTER
Per patient's My Chart message, he would like to send the script to [facility]. I will send his other message via [facility] to Dr. Rudd and close this one

## 2024-09-04 NOTE — TELEPHONE ENCOUNTER
Medication and Quantity requested:   oxyCODONE HCl (OXY-IR) 10 MG immediate release tablet      Last Visit  8/3/2024    Pharmacy and phone number updated in EPIC:  yes  [id number] - [facility] [address] - P [phone] - F [phone]

## 2024-09-21 ENCOUNTER — HOSPITAL ENCOUNTER (OUTPATIENT)
Dept: CT IMAGING | Age: 64
Discharge: HOME | End: 2024-09-21
Attending: INTERNAL MEDICINE
Payer: MEDICARE

## 2024-09-21 DIAGNOSIS — Z72.0 TOBACCO ABUSE: ICD-10-CM

## 2024-09-21 PROCEDURE — 71271 CT THORAX LUNG CANCER SCR C-: CPT

## 2024-09-27 RX ORDER — PREDNISONE 10 MG/1
TABLET ORAL
Qty: 18 TABLET | Refills: 0 | Status: SHIPPED | OUTPATIENT
Start: 2024-09-27

## 2024-09-29 DIAGNOSIS — M54.42 CHRONIC LEFT-SIDED LOW BACK PAIN WITH LEFT-SIDED SCIATICA: ICD-10-CM

## 2024-09-29 DIAGNOSIS — G89.29 CHRONIC LEFT-SIDED LOW BACK PAIN WITH LEFT-SIDED SCIATICA: ICD-10-CM

## 2024-09-29 DIAGNOSIS — M54.2 NECK PAIN: ICD-10-CM

## 2024-09-29 DIAGNOSIS — K21.9 GASTROESOPHAGEAL REFLUX DISEASE, UNSPECIFIED WHETHER ESOPHAGITIS PRESENT: ICD-10-CM

## 2024-09-30 DIAGNOSIS — K21.9 GASTROESOPHAGEAL REFLUX DISEASE, UNSPECIFIED WHETHER ESOPHAGITIS PRESENT: ICD-10-CM

## 2024-09-30 RX ORDER — ALBUTEROL SULFATE 90 UG/1
2 INHALANT RESPIRATORY (INHALATION) 4 TIMES DAILY PRN
Qty: 54 G | Refills: 2 | Status: SHIPPED | OUTPATIENT
Start: 2024-09-30

## 2024-09-30 RX ORDER — PANTOPRAZOLE SODIUM 40 MG/1
40 TABLET, DELAYED RELEASE ORAL
Qty: 30 TABLET | Refills: 3 | Status: SHIPPED | OUTPATIENT
Start: 2024-09-30 | End: 2024-10-02

## 2024-09-30 RX ORDER — FLUTICASONE FUROATE, UMECLIDINIUM BROMIDE AND VILANTEROL TRIFENATATE 100; 62.5; 25 UG/1; UG/1; UG/1
1 POWDER RESPIRATORY (INHALATION) DAILY
Qty: 180 EACH | Refills: 2 | Status: SHIPPED | OUTPATIENT
Start: 2024-09-30

## 2024-09-30 RX ORDER — OXYCODONE HYDROCHLORIDE 10 MG/1
10 TABLET ORAL EVERY 6 HOURS PRN
Qty: 120 TABLET | Refills: 0 | OUTPATIENT
Start: 2024-09-30 | End: 2024-10-30

## 2024-10-02 RX ORDER — PANTOPRAZOLE SODIUM 40 MG/1
40 TABLET, DELAYED RELEASE ORAL DAILY
Qty: 30 TABLET | Refills: 3 | Status: SHIPPED | OUTPATIENT
Start: 2024-10-02

## 2024-10-02 NOTE — TELEPHONE ENCOUNTER
Lov 09/03/2024        Lrf  09/30/2024 30 tab 3 refills Medication:   Requested Prescriptions     Pending Prescriptions Disp Refills    pantoprazole (PROTONIX) 40 MG tablet [Pharmacy Med Name: PANTOPRAZOLE 40MG TABLETS] 30 tablet 3     Sig: TAKE 1 TABLET BY MOUTH DAILY WITH BREAKFAST        Last Filled:      Patient Phone Number: [phone] (home)     Last appt: 9/3/2024   Next appt: Visit date not found    Last OARRS:       9/3/2024     4:49 PM   RX Monitoring   Periodic Controlled Substance Monitoring No signs of potential drug abuse or diversion identified.

## 2024-10-03 DIAGNOSIS — M54.2 NECK PAIN: ICD-10-CM

## 2024-10-03 DIAGNOSIS — G89.29 CHRONIC LEFT-SIDED LOW BACK PAIN WITH LEFT-SIDED SCIATICA: ICD-10-CM

## 2024-10-03 DIAGNOSIS — M54.42 CHRONIC LEFT-SIDED LOW BACK PAIN WITH LEFT-SIDED SCIATICA: ICD-10-CM

## 2024-10-03 RX ORDER — OXYCODONE HYDROCHLORIDE 10 MG/1
10 TABLET ORAL EVERY 6 HOURS PRN
Qty: 120 TABLET | Refills: 0 | Status: SHIPPED | OUTPATIENT
Start: 2024-10-03 | End: 2024-11-02

## 2024-10-03 NOTE — TELEPHONE ENCOUNTER
Medication and Quantity requested:        oxyCODONE HCl (OXY-IR) 10 MG immediate release tablet [[id number]]       Last Visit    09/03/2024    Pharmacy and phone number updated in EPIC:  yes    [facility]

## 2024-10-03 NOTE — TELEPHONE ENCOUNTER
April 27, 2021     DO Julian Nevarez [id number]  [id number]    Patient: Landry Serna   YOB: 1945   Date of Visit: 4/27/2021       Dear Dr Evaristo Green: Thank you for referring Landry Serna to me for evaluation  Below are my notes for this consultation  If you have questions, please do not hesitate to call me  I look forward to following your patient along with you  Sincerely,        Khadijah Hernandez MD        CC: No Recipients  Nicki Smith, [place]  4/27/2021  3:56 PM  Attested  Procedure: S/P coronary artery bypass grafting x 4, performed on 3/31/2021    History: Landry Serna is a 78y o  year old female who presents to our office today for routine follow up care from coronary artery bypass grafting  Patient is doing well without complaints  Denies CP, SOB, worsening LE edema, numbness/paraesthesias, syncope, popping/clicking of sternum  She lives with daughter who cares for her incision, cleaning daily  Daughter states she had mild oozing drainage at top of chest incision which has healed, now with lower chest incision oozing  Patient states she does not wear a bra  She is staying active, walking daily, abiding by weight lifting restrictions, and weighing herself daily  Vital Signs:   Vitals:    04/27/21 1523 04/27/21 1528   BP: (!) 182/90 170/94   BP Location: Left arm Right arm   Patient Position: Sitting Sitting   Cuff Size: Standard    Pulse: 77    Resp: 18    Temp: (!) 97 3 °F (36 3 °C)    TempSrc: Tympanic    SpO2: 97%    Weight: 92 9 kg (204 lb 11 2 oz)    Height: 5' 2" (1 575 m)        Home Medications:   Prior to Admission medications    Medication Sig Start Date End Date Taking?  Authorizing Provider   acetaminophen (TYLENOL) 325 mg tablet Take 2 tablets (650 mg total) by mouth every 6 (six) hours as needed for mild pain or moderate pain 4/5/21   Chito Medina PA-C   aspirin 325 mg tablet Take 1 tablet (325 mg total) by mouth daily 4/5/21   Keith Milligan PA-C Medication:   Requested Prescriptions     Pending Prescriptions Disp Refills    oxyCODONE HCl (OXY-IR) 10 MG immediate release tablet 120 tablet 0     Sig: Take 1 tablet by mouth every 6 hours as needed for Pain for up to 30 days. for pain for up to 30 days Max Daily Amount: 40 mg        Last Filled:  09/04/2024, 120, 0    Patient Phone Number: [phone] (home)     Last appt: 9/3/2024   Next appt: Visit date not found    Last OARRS:       9/3/2024     4:49 PM   RX Monitoring   Periodic Controlled Substance Monitoring No signs of potential drug abuse or diversion identified.            atorvastatin (LIPITOR) 80 mg tablet Take 80 mg by mouth daily    Historical Provider, MD   docusate sodium (COLACE) 100 mg capsule Take 1 capsule (100 mg total) by mouth 2 (two) times a day  Patient not taking: Reported on 4/8/2021 4/5/21   Judd Medina PA-C   lisinopril (ZESTRIL) 20 mg tablet Take 0 5 tablets (10 mg total) by mouth daily 4/8/21   SHMUEL Jacob   metFORMIN (GLUCOPHAGE) 850 mg tablet Take 850 mg by mouth Three times a day    Historical Provider, MD   metoprolol tartrate (LOPRESSOR) 100 mg tablet Take 1 tablet (100 mg total) by mouth every 12 (twelve) hours 4/5/21   Judd Medina PA-C   Omeprazole 20 MG TBEC Take 20 mg by mouth daily    Historical Provider, MD   polyethylene glycol (MIRALAX) 17 g packet Take 17 g by mouth daily  Patient not taking: Reported on 4/8/2021 4/5/21   Judd Medina PA-C   potassium chloride (K-DUR,KLOR-CON) 20 mEq tablet Take one tablet ( 20 meq) daily until 4/19 then stop 4/8/21   SHMUEL Jacob   torsemide [facility]) 20 mg tablet One tab daily until 4/19 then stop 4/8/21   SHMUEL Jacob       Physical Exam:    HEENT/NECK:  Normocephalic  Atraumatic  No jugular venous distention  Cardiac: Regular rate and rhythm and No murmurs/rubs/gallops  Pulmonary:  Breath sounds clear bilaterally and No rales/rhonchi/wheezes  Abdomen:  Non-tender, Non-distended and Normal bowel sounds  Incisions: Sternum is stable  Incision is clean, dry, and intact  Minimal serous drainage at distal chest incision  Extremities: Extremities warm/dry and 2+ edema B/L  Neuro: Alert and oriented X 3  Sensation is grossly intact  No focal deficits  Skin: Warm/Dry, without rashes or lesions  Lab Results:               Invalid input(s): LABGLOM      Lab Results   Component Value Date    HGBA1C 6 4 (H) 03/23/2021     Lab Results   Component Value Date    TROPONINI >40 00 (H) 03/23/2021       Assessment: Coronary artery disease   S/P coronary artery bypass grafting;    Plan:     Leopoldo Poole continues to recover well following coronary artery bypass grafting  To date they have made progressive improvements physical rehabilitation  At this point I have cleared them to begin outpatient cardiac rehabilitation and have encouraged them to to do so  Leopoldo Poole has also been cleared to resume driving at this point  I asked that them do so in progressive increments  I did remind them of their ongoing lifting restrictions of 25 pounds for an additional 2 months  Leopoldo Poole has already been evaluated by their primary care physician cardiologist for ongoing medical care  At this point I have not scheduled them for routine followup care with our office  I have advised them to call with any new concerns that may arise  Leopoldo Poole was comfortable with our recommendations and their questions were answered to their satisfaction  Bárbara Rodriguez PA-C  04/27/21    * This note was completed in part utilizing Qwite direct voice recognition software  Grammatical errors, random word insertion, spelling mistakes, and incomplete sentences may be an occasional consequence of the system secondary to software limitations, ambient noise and hardware issues  At the time of dictation, efforts were made to edit, clarify and /or correct errors  Please read the chart carefully and recognize, using context, where substitutions have occurred  If you have any questions or concerns about the context, text or information contained within the body of this dictation, please contact myself, the provider, for further clarification  Attestation signed by Zena Durham MD at 4/27/2021  4:02 PM:  Pt seen and examined with PA  I agree with the above assessment and plan  Cristiana Barr is recovering well following recent CABG  Her incision is healing nicely  It still has some eschar and wound care instructions were given  She is still getting Home PT    She'll start cardiac rehab after home PT is complete, presumably in a couple weeks  I told her she can start to drive at that time as well  She and her daughter both had COVID earlier this year, and are refusing vaccination  I did try to educate them on the importance of vaccination despite their history of infection        SIGNATURESaintclair Headland, MD  DATE: April 27, 2021  TIME: 4:00 PM

## 2024-10-28 DIAGNOSIS — M54.2 NECK PAIN: ICD-10-CM

## 2024-10-28 DIAGNOSIS — G89.29 CHRONIC LEFT-SIDED LOW BACK PAIN WITH LEFT-SIDED SCIATICA: ICD-10-CM

## 2024-10-28 DIAGNOSIS — M54.42 CHRONIC LEFT-SIDED LOW BACK PAIN WITH LEFT-SIDED SCIATICA: ICD-10-CM

## 2024-10-29 RX ORDER — OXYCODONE HYDROCHLORIDE 10 MG/1
10 TABLET ORAL EVERY 6 HOURS PRN
Qty: 120 TABLET | Refills: 0 | Status: SHIPPED | OUTPATIENT
Start: 2024-10-29 | End: 2024-11-28

## 2024-10-29 NOTE — TELEPHONE ENCOUNTER
Medication:   Requested Prescriptions     Pending Prescriptions Disp Refills    oxyCODONE HCl (OXY-IR) 10 MG immediate release tablet 120 tablet 0     Sig: Take 1 tablet by mouth every 6 hours as needed for Pain for up to 30 days. for pain for up to 30 days Max Daily Amount: 40 mg        Last Filled:  10/03/2024, 120, 0    Patient Phone Number: [phone] (home)     Last appt: 9/3/2024   Next appt: 12/23/2024    Last OARRS:       9/3/2024     4:49 PM   RX Monitoring   Periodic Controlled Substance Monitoring No signs of potential drug abuse or diversion identified.

## 2024-11-08 RX ORDER — PREDNISONE 10 MG/1
TABLET ORAL
Qty: 18 TABLET | Refills: 0 | OUTPATIENT
Start: 2024-11-08

## 2024-11-20 ENCOUNTER — TELEPHONE (OUTPATIENT)
Dept: FAMILY MEDICINE CLINIC | Age: 64
End: 2024-11-20

## 2024-11-20 NOTE — TELEPHONE ENCOUNTER
[facility] transfer to Nurse triage    Patient is calling with complaint of Cough and Sinus  emphsema hx chets congestion denies SOB    This has been going on  5 days     Patient is scheduled tomorrow      Has patient tried any over the counter medications? no

## 2024-11-25 ENCOUNTER — OFFICE VISIT (OUTPATIENT)
Dept: PULMONOLOGY | Age: 64
End: 2024-11-25
Payer: MEDICARE

## 2024-11-25 VITALS
OXYGEN SATURATION: 93 % | WEIGHT: 205 LBS | HEART RATE: 97 BPM | SYSTOLIC BLOOD PRESSURE: 138 MMHG | HEIGHT: 70 IN | BODY MASS INDEX: 29.35 KG/M2 | DIASTOLIC BLOOD PRESSURE: 72 MMHG

## 2024-11-25 DIAGNOSIS — R06.02 SHORTNESS OF BREATH: Primary | ICD-10-CM

## 2024-11-25 DIAGNOSIS — J44.9 COPD, MODERATE (HCC): ICD-10-CM

## 2024-11-25 DIAGNOSIS — F17.210 CIGARETTE NICOTINE DEPENDENCE WITHOUT COMPLICATION: ICD-10-CM

## 2024-11-25 PROCEDURE — 99406 BEHAV CHNG SMOKING 3-10 MIN: CPT | Performed by: INTERNAL MEDICINE

## 2024-11-25 PROCEDURE — 3078F DIAST BP <80 MM HG: CPT | Performed by: INTERNAL MEDICINE

## 2024-11-25 PROCEDURE — 4004F PT TOBACCO SCREEN RCVD TLK: CPT | Performed by: INTERNAL MEDICINE

## 2024-11-25 PROCEDURE — 99214 OFFICE O/P EST MOD 30 MIN: CPT | Performed by: INTERNAL MEDICINE

## 2024-11-25 PROCEDURE — G8484 FLU IMMUNIZE NO ADMIN: HCPCS | Performed by: INTERNAL MEDICINE

## 2024-11-25 PROCEDURE — 3017F COLORECTAL CA SCREEN DOC REV: CPT | Performed by: INTERNAL MEDICINE

## 2024-11-25 PROCEDURE — G8428 CUR MEDS NOT DOCUMENT: HCPCS | Performed by: INTERNAL MEDICINE

## 2024-11-25 PROCEDURE — 3075F SYST BP GE 130 - 139MM HG: CPT | Performed by: INTERNAL MEDICINE

## 2024-11-25 PROCEDURE — 3023F SPIROM DOC REV: CPT | Performed by: INTERNAL MEDICINE

## 2024-11-25 PROCEDURE — G8417 CALC BMI ABV UP PARAM F/U: HCPCS | Performed by: INTERNAL MEDICINE

## 2024-11-25 RX ORDER — FLUTICASONE FUROATE, UMECLIDINIUM BROMIDE AND VILANTEROL TRIFENATATE 100; 62.5; 25 UG/1; UG/1; UG/1
1 POWDER RESPIRATORY (INHALATION) DAILY
Qty: 180 EACH | Refills: 2 | Status: SHIPPED | OUTPATIENT
Start: 2024-11-25

## 2024-11-25 RX ORDER — PREDNISONE 10 MG/1
TABLET ORAL
Qty: 18 TABLET | Refills: 0 | Status: SHIPPED | OUTPATIENT
Start: 2024-11-25

## 2024-11-25 ASSESSMENT — ENCOUNTER SYMPTOMS
ABDOMINAL PAIN: 0
RHINORRHEA: 0
WHEEZING: 0
COLOR CHANGE: 0
CHOKING: 0
VOMITING: 0
DIARRHEA: 0
APNEA: 0
CONSTIPATION: 0
BLOOD IN STOOL: 0
SORE THROAT: 0
SHORTNESS OF BREATH: 1
CHEST TIGHTNESS: 1
BACK PAIN: 0
VOICE CHANGE: 0
STRIDOR: 0
COUGH: 1
SINUS PRESSURE: 0
ABDOMINAL DISTENTION: 0

## 2024-11-25 NOTE — PROGRESS NOTES
adenopathy.   Skin:     Coloration: Skin is not pale.      Findings: No erythema, lesion or rash.   Neurological:      Mental Status: He is alert and oriented to person, place, and time. Mental status is at baseline.      Motor: No abnormal muscle tone.         Health Maintenance   Topic Date Due    Pneumococcal 0-64 years Vaccine (1 of 2 - PCV) Never done    DTaP/Tdap/Td vaccine (1 - Tdap) Never done    Shingles vaccine (1 of 2) Never done    Flu vaccine (1) Never done    COVID-19 Vaccine (4 - 2023-24 season) 09/01/2024    Depression Monitoring  05/06/2025    Annual Wellness Visit (Medicare)  05/10/2025    Lipids  07/08/2025    Lung Cancer Screening &/or Counseling  09/21/2025    Colorectal Cancer Screen  11/23/2031    Respiratory Syncytial Virus (RSV) Pregnant or age 60 yrs+  Completed    HIV screen  Completed    Hepatitis A vaccine  Aged Out    Hepatitis B vaccine  Aged Out    Hib vaccine  Aged Out    Polio vaccine  Aged Out    Meningococcal (ACWY) vaccine  Aged Out    A1C test (Diabetic or Prediabetic)  Discontinued    Diabetes screen  Discontinued    Hepatitis C screen  Discontinued    Prostate Specific Antigen (PSA) Screening or Monitoring  Discontinued        Assessment/Plan:     Diagnosis Orders   1. Shortness of breath  Exercise stress test    Echo (TTE) complete (PRN contrast/bubble/strain/3D)      2. COPD, moderate (HCC)  Full PFT Study With Bronchodilator      3.  Nicotine dependence    COPD, moderate-appears to be in acute exacerbation.  He was previously treated with a course of prednisone in September-will require another prednisone course which has been ordered today.  PFT from July 2023 showed moderate obstruction with FEV1 of 2.22 L [61% predicted] with significant bronchodilator reversibility.  Currently using Trelegy 100 and albuterol inhaler as needed.  He states that the cost of Trelegy is expensive but agrees to stay on this inhaler.    LDCT from 9/21 showed no lung nodules with evidence of

## 2024-11-26 DIAGNOSIS — G89.29 CHRONIC LEFT-SIDED LOW BACK PAIN WITH LEFT-SIDED SCIATICA: ICD-10-CM

## 2024-11-26 DIAGNOSIS — M54.42 CHRONIC LEFT-SIDED LOW BACK PAIN WITH LEFT-SIDED SCIATICA: ICD-10-CM

## 2024-11-26 DIAGNOSIS — M54.2 NECK PAIN: ICD-10-CM

## 2024-11-27 RX ORDER — OXYCODONE HYDROCHLORIDE 10 MG/1
10 TABLET ORAL EVERY 6 HOURS PRN
Qty: 120 TABLET | Refills: 0 | Status: SHIPPED | OUTPATIENT
Start: 2024-11-27 | End: 2024-12-27

## 2024-11-27 NOTE — TELEPHONE ENCOUNTER
Medication:   Requested Prescriptions     Pending Prescriptions Disp Refills    oxyCODONE HCl (OXY-IR) 10 MG immediate release tablet 120 tablet 0     Sig: Take 1 tablet by mouth every 6 hours as needed for Pain for up to 30 days. for pain for up to 30 days Max Daily Amount: 40 mg        Last Filled:  10/29/2024    Patient Phone Number: [phone] (home)     Last appt: 9/3/2024   Next appt: 12/23/2024    Last OARRS:       9/3/2024     4:49 PM   RX Monitoring   Periodic Controlled Substance Monitoring No signs of potential drug abuse or diversion identified.

## 2024-12-23 ENCOUNTER — OFFICE VISIT (OUTPATIENT)
Dept: FAMILY MEDICINE CLINIC | Age: 64
End: 2024-12-23

## 2024-12-23 VITALS
DIASTOLIC BLOOD PRESSURE: 70 MMHG | OXYGEN SATURATION: 96 % | SYSTOLIC BLOOD PRESSURE: 136 MMHG | WEIGHT: 204 LBS | BODY MASS INDEX: 29.27 KG/M2 | HEART RATE: 93 BPM

## 2024-12-23 DIAGNOSIS — G89.29 CHRONIC LEFT-SIDED LOW BACK PAIN WITH LEFT-SIDED SCIATICA: ICD-10-CM

## 2024-12-23 DIAGNOSIS — M54.2 NECK PAIN: ICD-10-CM

## 2024-12-23 DIAGNOSIS — I10 ESSENTIAL HYPERTENSION: Primary | ICD-10-CM

## 2024-12-23 DIAGNOSIS — I10 ESSENTIAL HYPERTENSION: ICD-10-CM

## 2024-12-23 DIAGNOSIS — E78.5 HYPERLIPIDEMIA, UNSPECIFIED HYPERLIPIDEMIA TYPE: ICD-10-CM

## 2024-12-23 DIAGNOSIS — J43.9 PULMONARY EMPHYSEMA, UNSPECIFIED EMPHYSEMA TYPE (HCC): ICD-10-CM

## 2024-12-23 DIAGNOSIS — M54.42 CHRONIC LEFT-SIDED LOW BACK PAIN WITH LEFT-SIDED SCIATICA: ICD-10-CM

## 2024-12-23 RX ORDER — ATORVASTATIN CALCIUM 10 MG/1
TABLET, FILM COATED ORAL
Qty: 90 TABLET | Refills: 1 | Status: SHIPPED | OUTPATIENT
Start: 2024-12-23

## 2024-12-23 RX ORDER — HYDROCHLOROTHIAZIDE 12.5 MG/1
12.5 TABLET ORAL DAILY
Qty: 90 TABLET | Refills: 1 | Status: SHIPPED | OUTPATIENT
Start: 2024-12-23

## 2024-12-23 RX ORDER — AMLODIPINE BESYLATE 5 MG/1
TABLET ORAL
Qty: 90 TABLET | Refills: 1 | Status: SHIPPED | OUTPATIENT
Start: 2024-12-23

## 2024-12-23 RX ORDER — OXYCODONE HYDROCHLORIDE 10 MG/1
10 TABLET ORAL EVERY 6 HOURS PRN
Qty: 120 TABLET | Refills: 0 | Status: SHIPPED | OUTPATIENT
Start: 2024-12-23 | End: 2025-01-22

## 2024-12-23 ASSESSMENT — ENCOUNTER SYMPTOMS
SHORTNESS OF BREATH: 1
GASTROINTESTINAL NEGATIVE: 1
BACK PAIN: 1

## 2024-12-23 NOTE — PROGRESS NOTES
Thyroid: No thyromegaly.      Vascular: No carotid bruit.   Cardiovascular:      Rate and Rhythm: Normal rate and regular rhythm.   Pulmonary:      Effort: Pulmonary effort is normal.      Breath sounds: Normal breath sounds. No wheezing or rales.   Musculoskeletal:      Cervical back: Neck supple.   Lymphadenopathy:      Cervical: No cervical adenopathy.   Skin:     General: Skin is warm and dry.   Neurological:      Mental Status: He is alert and oriented to person, place, and time.   Psychiatric:         Mood and Affect: Mood normal.         Behavior: Behavior normal.         Thought Content: Thought content normal.         Judgment: Judgment normal.        /82        An electronic signature was used to authenticate this note.    --Isaac Rudd MD

## 2024-12-23 NOTE — TELEPHONE ENCOUNTER
Last OV: 9/3/2024  Next OV: 12/23/2024    Next appointment due:    Last fill:07/29/2024  Refills:90/1  Medication:   Requested Prescriptions     Pending Prescriptions Disp Refills    hydroCHLOROthiazide 12.5 MG tablet 90 tablet 1     Sig: Take 1 tablet by mouth daily    atorvastatin (LIPITOR) 10 MG tablet 90 tablet 1     Sig: TAKE 1 TABLET BY MOUTH EVERY DAY    amLODIPine (NORVASC) 5 MG tablet 90 tablet 1     Sig: TAKE 1 TABLET BY MOUTH EVERY DAY       Last Filled:      Patient Phone Number: [phone] (home)     Last appt: 9/3/2024   Next appt: 12/23/2024    Last Lipid:   Lab Results   Component Value Date/Time    CHOL 136 07/08/2024 08:51 AM    TRIG 147 07/08/2024 08:51 AM    HDL 42 07/08/2024 08:51 AM             Medication:   Requested Prescriptions     Pending Prescriptions Disp Refills    hydroCHLOROthiazide 12.5 MG tablet 90 tablet 1     Sig: Take 1 tablet by mouth daily    atorvastatin (LIPITOR) 10 MG tablet 90 tablet 1     Sig: TAKE 1 TABLET BY MOUTH EVERY DAY    amLODIPine (NORVASC) 5 MG tablet 90 tablet 1     Sig: TAKE 1 TABLET BY MOUTH EVERY DAY       Last Filled:      Patient Phone Number: [phone] (home)     Last appt: 9/3/2024   Next appt: 12/23/2024    Lab Results   Component Value Date     07/08/2024    K 4.1 07/08/2024     07/08/2024    CO2 26 07/08/2024    BUN 14 07/08/2024    CREATININE 0.6 (L) 07/08/2024    GLUCOSE 84 07/08/2024    CALCIUM 9.0 07/08/2024    BILITOT 0.3 07/08/2024    ALKPHOS 75 07/08/2024    AST 13 (L) 07/08/2024    ALT 13 07/08/2024    LABGLOM >90 07/08/2024    GFRAA >60 08/18/2022    AGRATIO 1.8 07/08/2024    GLOB 2.8 09/20/2021

## 2024-12-23 NOTE — TELEPHONE ENCOUNTER
Medication:   Requested Prescriptions     Pending Prescriptions Disp Refills    oxyCODONE HCl (OXY-IR) 10 MG immediate release tablet 120 tablet 0     Sig: Take 1 tablet by mouth every 6 hours as needed for Pain for up to 30 days. for pain for up to 30 days Max Daily Amount: 40 mg        Last Filled:  11/27/2024    Patient Phone Number: [phone] (home)     Last appt: 9/3/2024   Next appt: 12/23/2024    Last OARRS:       9/3/2024     4:49 PM   RX Monitoring   Periodic Controlled Substance Monitoring No signs of potential drug abuse or diversion identified.

## 2024-12-24 ENCOUNTER — PATIENT MESSAGE (OUTPATIENT)
Dept: FAMILY MEDICINE CLINIC | Age: 64
End: 2024-12-24

## 2025-01-02 ENCOUNTER — TELEPHONE (OUTPATIENT)
Dept: FAMILY MEDICINE CLINIC | Age: 65
End: 2025-01-02

## 2025-01-02 NOTE — TELEPHONE ENCOUNTER
Unable to leave message - mail box full    Appointment Request From: Jeremiah Nunez     With Provider: Dr. Rustam Osman DO [[facility]]     Preferred Date Range: Any     Preferred Times: Any Time     Reason for visit: Office Visit     Comments:  follow up

## 2025-01-22 DIAGNOSIS — G89.29 CHRONIC LEFT-SIDED LOW BACK PAIN WITH LEFT-SIDED SCIATICA: ICD-10-CM

## 2025-01-22 DIAGNOSIS — M54.42 CHRONIC LEFT-SIDED LOW BACK PAIN WITH LEFT-SIDED SCIATICA: ICD-10-CM

## 2025-01-22 DIAGNOSIS — M54.2 NECK PAIN: ICD-10-CM

## 2025-01-22 RX ORDER — OXYCODONE HYDROCHLORIDE 10 MG/1
10 TABLET ORAL EVERY 6 HOURS PRN
Qty: 120 TABLET | Refills: 0 | Status: SHIPPED | OUTPATIENT
Start: 2025-01-22 | End: 2025-02-21

## 2025-01-22 NOTE — TELEPHONE ENCOUNTER
Medication:   Requested Prescriptions     Pending Prescriptions Disp Refills    oxyCODONE HCl (OXY-IR) 10 MG immediate release tablet 120 tablet 0     Sig: Take 1 tablet by mouth every 6 hours as needed for Pain for up to 30 days. for pain for up to 30 days Max Daily Amount: 40 mg        Last Filled:  12/23/2024, 120, 0    Patient Phone Number: [phone] (home)     Last appt: 12/23/2024   Next appt: 2/20/2025    Last OARRS:       12/23/2024     1:10 PM   RX Monitoring   Periodic Controlled Substance Monitoring No signs of potential drug abuse or diversion identified.

## 2025-01-24 NOTE — TELEPHONE ENCOUNTER
Last appointment:  7/18/2023    Next appointment:  10/19/2023 Patient is aware xray has been ordered

## 2025-03-05 ENCOUNTER — TELEPHONE (OUTPATIENT)
Dept: FAMILY MEDICINE CLINIC | Age: 65
End: 2025-03-05

## 2025-03-05 NOTE — TELEPHONE ENCOUNTER
Patient scheduled.    Recent Visits  Date Type Provider Dept   12/23/24 Office Visit Isaac Rudd MD [facility] [place]    09/03/24 Office Visit Isaac Rudd MD [facility] [place]    08/08/24 Office Visit Isaac Rudd MD [facility] [place]    05/09/24 Office Visit Isaac Rudd MD [facility] [place]    11/16/23 Office Visit Isaac Rudd MD [facility]   Showing recent visits within past 540 days with a meds authorizing provider and meeting all other requirements  Future Appointments  Date Type Provider Dept   04/02/25 Appointment Rubina Pascual MD [facility]   Showing future appointments within next 150 days with a meds authorizing provider and meeting all other requirements

## 2025-03-18 ENCOUNTER — HOSPITAL ENCOUNTER (OUTPATIENT)
Age: 65
Discharge: HOME | End: 2025-03-20
Attending: INTERNAL MEDICINE
Payer: MEDICARE

## 2025-03-18 ENCOUNTER — HOSPITAL ENCOUNTER (OUTPATIENT)
Dept: PULMONOLOGY | Age: 65
Discharge: HOME | End: 2025-03-18
Attending: INTERNAL MEDICINE
Payer: MEDICARE

## 2025-03-18 ENCOUNTER — RESULTS FOLLOW-UP (OUTPATIENT)
Age: 65
End: 2025-03-18

## 2025-03-18 ENCOUNTER — TELEPHONE (OUTPATIENT)
Dept: PULMONOLOGY | Age: 65
End: 2025-03-18

## 2025-03-18 VITALS
DIASTOLIC BLOOD PRESSURE: 63 MMHG | BODY MASS INDEX: 29.2 KG/M2 | HEART RATE: 62 BPM | WEIGHT: 204 LBS | HEIGHT: 70 IN | SYSTOLIC BLOOD PRESSURE: 146 MMHG

## 2025-03-18 DIAGNOSIS — J44.9 COPD, MODERATE (HCC): ICD-10-CM

## 2025-03-18 DIAGNOSIS — R06.02 SHORTNESS OF BREATH: ICD-10-CM

## 2025-03-18 LAB
ECHO BSA: 2.14 M2
STRESS ANGINA INDEX: 0
STRESS BASELINE DIAS BP: 63 MMHG
STRESS BASELINE HR: 71 BPM
STRESS BASELINE ST DEPRESSION: 0 MM
STRESS BASELINE SYS BP: 146 MMHG
STRESS ESTIMATED WORKLOAD: 11.8 METS
STRESS EXERCISE DUR MIN: 9 MIN
STRESS EXERCISE DUR SEC: 31 SEC
STRESS O2 SAT PEAK: 88 %
STRESS O2 SAT REST: 91 %
STRESS PEAK DIAS BP: 66 MMHG
STRESS PEAK SYS BP: 197 MMHG
STRESS PERCENT HR ACHIEVED: 85 %
STRESS POST PEAK HR: 134 BPM
STRESS RATE PRESSURE PRODUCT: NORMAL BPM*MMHG
STRESS SR DUKE TREADMILL SCORE: 10
STRESS ST DEPRESSION: 0 MM
STRESS TARGET HR: 158 BPM

## 2025-03-18 PROCEDURE — 93017 CV STRESS TEST TRACING ONLY: CPT

## 2025-03-18 PROCEDURE — 93018 CV STRESS TEST I&R ONLY: CPT | Performed by: INTERNAL MEDICINE

## 2025-03-18 PROCEDURE — 93016 CV STRESS TEST SUPVJ ONLY: CPT | Performed by: INTERNAL MEDICINE

## 2025-03-24 ENCOUNTER — TELEPHONE (OUTPATIENT)
Dept: PULMONOLOGY | Age: 65
End: 2025-03-24

## 2025-03-24 ENCOUNTER — TELEPHONE (OUTPATIENT)
Dept: FAMILY MEDICINE CLINIC | Age: 65
End: 2025-03-24

## 2025-03-24 ENCOUNTER — OFFICE VISIT (OUTPATIENT)
Age: 65
End: 2025-03-24

## 2025-03-24 VITALS
HEIGHT: 70 IN | WEIGHT: 200 LBS | SYSTOLIC BLOOD PRESSURE: 114 MMHG | BODY MASS INDEX: 28.63 KG/M2 | OXYGEN SATURATION: 93 % | HEART RATE: 83 BPM | TEMPERATURE: 98.2 F | DIASTOLIC BLOOD PRESSURE: 69 MMHG

## 2025-03-24 DIAGNOSIS — J40 BRONCHITIS: Primary | ICD-10-CM

## 2025-03-24 DIAGNOSIS — J30.2 SEASONAL ALLERGIES: ICD-10-CM

## 2025-03-24 RX ORDER — ALBUTEROL SULFATE 90 UG/1
2 INHALANT RESPIRATORY (INHALATION) 4 TIMES DAILY PRN
Qty: 18 G | Refills: 0
Start: 2025-03-24

## 2025-03-24 RX ORDER — FLUTICASONE PROPIONATE 50 MCG
1 SPRAY, SUSPENSION (ML) NASAL DAILY
Qty: 16 G | Refills: 0 | Status: SHIPPED | OUTPATIENT
Start: 2025-03-24

## 2025-03-24 RX ORDER — AZITHROMYCIN 250 MG/1
TABLET, FILM COATED ORAL
Qty: 6 TABLET | Refills: 0 | Status: SHIPPED | OUTPATIENT
Start: 2025-03-24 | End: 2025-04-03

## 2025-03-24 RX ORDER — METHYLPREDNISOLONE 4 MG/1
TABLET ORAL
Qty: 1 KIT | Refills: 0 | Status: SHIPPED | OUTPATIENT
Start: 2025-03-24 | End: 2025-03-30

## 2025-03-24 RX ORDER — CETIRIZINE HYDROCHLORIDE 10 MG/1
10 TABLET ORAL DAILY
COMMUNITY
Start: 2025-03-24

## 2025-03-24 ASSESSMENT — ENCOUNTER SYMPTOMS
WHEEZING: 1
COUGH: 1

## 2025-03-24 NOTE — TELEPHONE ENCOUNTER
Called and spoke to patient informing that  is on vacation this week and recommended going to er or an urgent care for an evaluation. Patient voiced understanding

## 2025-03-24 NOTE — PROGRESS NOTES
Jeremiah Nunez (:  1960) is a 64 y.o. male,New patient, here for evaluation of the following chief complaint(s):  Congestion (Chest congestion . Pt states that he is having a COPD flare up x 1 week )      ASSESSMENT/PLAN:    ICD-10-CM    1. Bronchitis  J40 methylPREDNISolone (MEDROL DOSEPACK) 4 MG tablet     azithromycin (ZITHROMAX) 250 MG tablet     albuterol sulfate HFA (VENTOLIN HFA) 108 (90 Base) MCG/ACT inhaler      2. Seasonal allergies  J30.2 cetirizine (ZYRTEC ALLERGY) 10 MG tablet     fluticasone (FLONASE) 50 MCG/ACT nasal spray          Tobacco cessation recommended  Warm moist air in the shower will improve your breathing  Follow up with PCP in 3-5 days if symptoms persist or if symptoms worsen.    SUBJECTIVE/OBJECTIVE:  HPI  HPI:   64 y.o. male presents with symptoms of productive cough, wheezing and nasal congestion ongoing since 1 week. Denies fever or chills. Has taken albuterol inh for symptoms with no relief    Vitals:    25 1126   BP: 114/69   BP Site: Left Upper Arm   Patient Position: Sitting   BP Cuff Size: Large Adult   Pulse: 83   Temp: 98.2 °F (36.8 °C)   TempSrc: Oral   SpO2: 93%   Weight: 90.7 kg (200 lb)   Height: 1.778 m (5' 10\")       Review of Systems   HENT:  Positive for congestion.    Respiratory:  Positive for cough and wheezing.    Allergic/Immunologic: Positive for environmental allergies.   All other systems reviewed and are negative.      Physical Exam  Vitals and nursing note reviewed.   Constitutional:       Appearance: Normal appearance.   HENT:      Head: Normocephalic.      Right Ear: Tympanic membrane normal.      Left Ear: Tympanic membrane normal.      Nose: Congestion present.      Mouth/Throat:      Pharynx: No posterior oropharyngeal erythema.   Eyes:      General:         Right eye: No discharge.         Left eye: No discharge.      Conjunctiva/sclera: Conjunctivae normal.      Pupils: Pupils are equal, round, and reactive to light.

## 2025-03-24 NOTE — TELEPHONE ENCOUNTER
Patient called requesting a same day appointment with provider.    Symptoms:COPD Flare Up, tightness in chest, slight difficulty in breathing      No appointments available at time of search that met patient needs.     Please advise.

## 2025-03-24 NOTE — TELEPHONE ENCOUNTER
Pt was informed he would have to take care of this with pulmonology since he has not been established with Dr. Pascual yet.

## 2025-03-24 NOTE — TELEPHONE ENCOUNTER
Pt called and said he is having a flare up, he is having hard time breathing, chest is tight, coughing.    [facility]

## 2025-03-30 RX ORDER — METHYLPREDNISOLONE 4 MG/1
TABLET ORAL
Qty: 1 KIT | Refills: 0 | Status: SHIPPED | OUTPATIENT
Start: 2025-03-30 | End: 2025-04-05

## 2025-03-30 RX ORDER — AZITHROMYCIN 250 MG/1
TABLET, FILM COATED ORAL
Qty: 6 TABLET | Refills: 0 | Status: SHIPPED | OUTPATIENT
Start: 2025-03-30 | End: 2025-04-09

## 2025-03-31 ASSESSMENT — PATIENT HEALTH QUESTIONNAIRE - PHQ9
2. FEELING DOWN, DEPRESSED OR HOPELESS: SEVERAL DAYS
5. POOR APPETITE OR OVEREATING: NOT AT ALL
7. TROUBLE CONCENTRATING ON THINGS, SUCH AS READING THE NEWSPAPER OR WATCHING TELEVISION: SEVERAL DAYS
10. IF YOU CHECKED OFF ANY PROBLEMS, HOW DIFFICULT HAVE THESE PROBLEMS MADE IT FOR YOU TO DO YOUR WORK, TAKE CARE OF THINGS AT HOME, OR GET ALONG WITH OTHER PEOPLE: SOMEWHAT DIFFICULT
2. FEELING DOWN, DEPRESSED OR HOPELESS: SEVERAL DAYS
6. FEELING BAD ABOUT YOURSELF - OR THAT YOU ARE A FAILURE OR HAVE LET YOURSELF OR YOUR FAMILY DOWN: SEVERAL DAYS
1. LITTLE INTEREST OR PLEASURE IN DOING THINGS: SEVERAL DAYS
SUM OF ALL RESPONSES TO PHQ QUESTIONS 1-9: 7
6. FEELING BAD ABOUT YOURSELF - OR THAT YOU ARE A FAILURE OR HAVE LET YOURSELF OR YOUR FAMILY DOWN: SEVERAL DAYS
9. THOUGHTS THAT YOU WOULD BE BETTER OFF DEAD, OR OF HURTING YOURSELF: NOT AT ALL
3. TROUBLE FALLING OR STAYING ASLEEP: SEVERAL DAYS
4. FEELING TIRED OR HAVING LITTLE ENERGY: SEVERAL DAYS
1. LITTLE INTEREST OR PLEASURE IN DOING THINGS: SEVERAL DAYS
SUM OF ALL RESPONSES TO PHQ QUESTIONS 1-9: 7
3. TROUBLE FALLING OR STAYING ASLEEP: SEVERAL DAYS
8. MOVING OR SPEAKING SO SLOWLY THAT OTHER PEOPLE COULD HAVE NOTICED. OR THE OPPOSITE, BEING SO FIGETY OR RESTLESS THAT YOU HAVE BEEN MOVING AROUND A LOT MORE THAN USUAL: SEVERAL DAYS
5. POOR APPETITE OR OVEREATING: NOT AT ALL
7. TROUBLE CONCENTRATING ON THINGS, SUCH AS READING THE NEWSPAPER OR WATCHING TELEVISION: SEVERAL DAYS
10. IF YOU CHECKED OFF ANY PROBLEMS, HOW DIFFICULT HAVE THESE PROBLEMS MADE IT FOR YOU TO DO YOUR WORK, TAKE CARE OF THINGS AT HOME, OR GET ALONG WITH OTHER PEOPLE: SOMEWHAT DIFFICULT
4. FEELING TIRED OR HAVING LITTLE ENERGY: SEVERAL DAYS
8. MOVING OR SPEAKING SO SLOWLY THAT OTHER PEOPLE COULD HAVE NOTICED. OR THE OPPOSITE - BEING SO FIDGETY OR RESTLESS THAT YOU HAVE BEEN MOVING AROUND A LOT MORE THAN USUAL: SEVERAL DAYS
9. THOUGHTS THAT YOU WOULD BE BETTER OFF DEAD, OR OF HURTING YOURSELF: NOT AT ALL

## 2025-04-02 ENCOUNTER — OFFICE VISIT (OUTPATIENT)
Dept: FAMILY MEDICINE CLINIC | Age: 65
End: 2025-04-02

## 2025-04-02 ENCOUNTER — TELEPHONE (OUTPATIENT)
Dept: FAMILY MEDICINE CLINIC | Age: 65
End: 2025-04-02

## 2025-04-02 VITALS
SYSTOLIC BLOOD PRESSURE: 132 MMHG | HEART RATE: 84 BPM | DIASTOLIC BLOOD PRESSURE: 78 MMHG | WEIGHT: 215 LBS | OXYGEN SATURATION: 93 % | HEIGHT: 70 IN | BODY MASS INDEX: 30.78 KG/M2

## 2025-04-02 DIAGNOSIS — D35.02 ADENOMA OF LEFT ADRENAL GLAND: ICD-10-CM

## 2025-04-02 DIAGNOSIS — M51.26 DISPLACEMENT OF LUMBAR INTERVERTEBRAL DISC WITHOUT MYELOPATHY: ICD-10-CM

## 2025-04-02 DIAGNOSIS — E78.5 HYPERLIPIDEMIA, UNSPECIFIED HYPERLIPIDEMIA TYPE: ICD-10-CM

## 2025-04-02 DIAGNOSIS — K76.0 FATTY LIVER: ICD-10-CM

## 2025-04-02 DIAGNOSIS — M54.2 NECK PAIN: ICD-10-CM

## 2025-04-02 DIAGNOSIS — G89.29 CHRONIC LEFT-SIDED LOW BACK PAIN WITH LEFT-SIDED SCIATICA: ICD-10-CM

## 2025-04-02 DIAGNOSIS — K21.9 GASTROESOPHAGEAL REFLUX DISEASE, UNSPECIFIED WHETHER ESOPHAGITIS PRESENT: ICD-10-CM

## 2025-04-02 DIAGNOSIS — J44.1 COPD WITH ACUTE EXACERBATION (HCC): ICD-10-CM

## 2025-04-02 DIAGNOSIS — F10.11 HISTORY OF ALCOHOL ABUSE: ICD-10-CM

## 2025-04-02 DIAGNOSIS — Z72.0 TOBACCO ABUSE: ICD-10-CM

## 2025-04-02 DIAGNOSIS — I10 ESSENTIAL HYPERTENSION: Primary | ICD-10-CM

## 2025-04-02 DIAGNOSIS — M54.42 CHRONIC LEFT-SIDED LOW BACK PAIN WITH LEFT-SIDED SCIATICA: ICD-10-CM

## 2025-04-02 PROBLEM — R07.9 CHEST PAIN: Status: RESOLVED | Noted: 2022-08-17 | Resolved: 2025-04-02

## 2025-04-02 PROBLEM — J43.8 OTHER EMPHYSEMA (HCC): Status: ACTIVE | Noted: 2019-04-04

## 2025-04-02 RX ORDER — BUPRENORPHINE AND NALOXONE 8; 2 MG/1; MG/1
1 FILM, SOLUBLE BUCCAL; SUBLINGUAL 3 TIMES DAILY
COMMUNITY

## 2025-04-02 SDOH — ECONOMIC STABILITY: FOOD INSECURITY: WITHIN THE PAST 12 MONTHS, YOU WORRIED THAT YOUR FOOD WOULD RUN OUT BEFORE YOU GOT MONEY TO BUY MORE.: NEVER TRUE

## 2025-04-02 SDOH — ECONOMIC STABILITY: FOOD INSECURITY: WITHIN THE PAST 12 MONTHS, THE FOOD YOU BOUGHT JUST DIDN'T LAST AND YOU DIDN'T HAVE MONEY TO GET MORE.: NEVER TRUE

## 2025-04-02 NOTE — PROGRESS NOTES
Jeremiah Nunez is a 64 y.o. male here to establish care. The patient has had a primary care physician in the recent past, Dr. Rudd.     HPI:  Hx hypertension- on hydrochlorothiazide and amlodipine. BP has been stable.     Hx hyperlipidemia- last lipid panel stable.     Hx COPD- hx tobacco abuse. Using trelegy daily with albuterol PRN. Smoking 1ppd, down from 2ppd. Sees pulm. Had CT 6m ago for lung cancer screening which did not show any pulmonary nodules.   Has been sick with bronchitis recently. Went to urgent care, was put on zithromax and medrol 3 days ago, helping some. Plans to follow up with pulmonology if symptoms do not improve. Also taking zyrtec and flonase daily for these symptoms recently, not sure if helping.     Hx GERD- on protonix daily.     Hx chronic back pain. S/p lumbar fusion and left hemilaminectomy at L5-S1 with Queenie in 2006 and 2008. States has been in pain ever since. Never returned to work. Is on disability. Has also had chronic neck pain. Has not had imaging done.   Had been on oxycodone 10mg QID for many years from last PCP. When he retired he was worried about withdrawal after stopping his pain medications. Went to [facility] rehab facility for addiction and was put on suboxone. Did help prevent withdrawal but did not help with his back pain.   Wants to return to his usual dose of oxycodone.   Has not seen surgeon in 2-3 years. Did see pain management in the past but was not happy with them, states they tried to increase his medications all the time and he did not want to.     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     Hx Fatty liver disease.  Hx former alcohol use, recovering alcoholic since 2007.    ROS:  Gen:  Denies fever, chills, unintentional weight loss.  HEENT:  Denies cold symptoms, sore throat.  CV:  Denies chest pain or tightness, palpitations, or edema.  Pulm:  Denies shortness of breath, cough.  Abd:  Denies abdominal pain, change in bowel habits, rectal bleeding,

## 2025-04-02 NOTE — TELEPHONE ENCOUNTER
Lmom for [facility] to get records released. Awaiting return call.

## 2025-04-14 NOTE — TELEPHONE ENCOUNTER
Patient needs to come in and fill out records release papers for both [facility] and [place], as discussed on Thursday when we discussed his appointment. Patient aware per Spencer, should be coming to fill out papers at his convenience.

## 2025-04-15 ENCOUNTER — OFFICE VISIT (OUTPATIENT)
Dept: PULMONOLOGY | Age: 65
End: 2025-04-15
Payer: MEDICARE

## 2025-04-15 VITALS
HEART RATE: 95 BPM | SYSTOLIC BLOOD PRESSURE: 120 MMHG | BODY MASS INDEX: 30.21 KG/M2 | OXYGEN SATURATION: 93 % | HEIGHT: 70 IN | WEIGHT: 211 LBS | DIASTOLIC BLOOD PRESSURE: 62 MMHG

## 2025-04-15 DIAGNOSIS — R06.09 DOE (DYSPNEA ON EXERTION): Primary | ICD-10-CM

## 2025-04-15 DIAGNOSIS — F17.210 CIGARETTE NICOTINE DEPENDENCE WITHOUT COMPLICATION: ICD-10-CM

## 2025-04-15 DIAGNOSIS — J44.1 COPD WITH ACUTE EXACERBATION (HCC): ICD-10-CM

## 2025-04-15 PROCEDURE — 4004F PT TOBACCO SCREEN RCVD TLK: CPT | Performed by: INTERNAL MEDICINE

## 2025-04-15 PROCEDURE — 99406 BEHAV CHNG SMOKING 3-10 MIN: CPT | Performed by: INTERNAL MEDICINE

## 2025-04-15 PROCEDURE — 3017F COLORECTAL CA SCREEN DOC REV: CPT | Performed by: INTERNAL MEDICINE

## 2025-04-15 PROCEDURE — 3074F SYST BP LT 130 MM HG: CPT | Performed by: INTERNAL MEDICINE

## 2025-04-15 PROCEDURE — G2211 COMPLEX E/M VISIT ADD ON: HCPCS | Performed by: INTERNAL MEDICINE

## 2025-04-15 PROCEDURE — G8427 DOCREV CUR MEDS BY ELIG CLIN: HCPCS | Performed by: INTERNAL MEDICINE

## 2025-04-15 PROCEDURE — 3078F DIAST BP <80 MM HG: CPT | Performed by: INTERNAL MEDICINE

## 2025-04-15 PROCEDURE — G8417 CALC BMI ABV UP PARAM F/U: HCPCS | Performed by: INTERNAL MEDICINE

## 2025-04-15 PROCEDURE — 99214 OFFICE O/P EST MOD 30 MIN: CPT | Performed by: INTERNAL MEDICINE

## 2025-04-15 PROCEDURE — 3023F SPIROM DOC REV: CPT | Performed by: INTERNAL MEDICINE

## 2025-04-15 RX ORDER — ALBUTEROL SULFATE 90 UG/1
2 INHALANT RESPIRATORY (INHALATION) 4 TIMES DAILY PRN
Qty: 18 G | Refills: 0 | Status: SHIPPED | OUTPATIENT
Start: 2025-04-15

## 2025-04-15 RX ORDER — ALBUTEROL SULFATE 0.83 MG/ML
2.5 SOLUTION RESPIRATORY (INHALATION) EVERY 6 HOURS PRN
Qty: 120 EACH | Refills: 3 | Status: SHIPPED | OUTPATIENT
Start: 2025-04-15

## 2025-04-15 RX ORDER — PREDNISONE 10 MG/1
TABLET ORAL
Qty: 10 TABLET | Refills: 0 | Status: SHIPPED | OUTPATIENT
Start: 2025-04-15

## 2025-04-15 RX ORDER — FLUTICASONE FUROATE, UMECLIDINIUM BROMIDE AND VILANTEROL TRIFENATATE 100; 62.5; 25 UG/1; UG/1; UG/1
1 POWDER RESPIRATORY (INHALATION) DAILY
Qty: 180 EACH | Refills: 2 | Status: SHIPPED | OUTPATIENT
Start: 2025-04-15

## 2025-04-15 ASSESSMENT — ENCOUNTER SYMPTOMS
ABDOMINAL DISTENTION: 0
APNEA: 0
VOICE CHANGE: 0
DIARRHEA: 0
RHINORRHEA: 0
VOMITING: 0
COLOR CHANGE: 0
SINUS PRESSURE: 0
COUGH: 1
SORE THROAT: 0
BACK PAIN: 0
WHEEZING: 1
ABDOMINAL PAIN: 0
STRIDOR: 0
BLOOD IN STOOL: 0
CONSTIPATION: 0
CHOKING: 0
SHORTNESS OF BREATH: 1
CHEST TIGHTNESS: 1

## 2025-04-15 NOTE — PROGRESS NOTES
Jeremiah Nunez    YOB: 1960     Date of Service:  4/15/2025     Chief Complaint   Patient presents with    Follow-up     Flair up       HPI patient states that he has not been feeling well for the last couple of weeks-recurrent episodes of bronchitis.  Seen in urgent care on 3/24 and treated with Zithromax and Medrol Dosepak with improvement in symptoms.  States that his shortness of breath, wheezing and cough has returned.  Nonproductive.  EASTMAN with activity.  Continues to smoke, but has cut back.    Allergies   Allergen Reactions    Nucynta [Tapentadol] Anaphylaxis     Hallucinations. Went to hospital.    Vicodin [Hydrocodone-Acetaminophen]      Itching      Codeine Rash     itching    Tylenol [Acetaminophen] Rash     Itching, pt states he is able to take     Outpatient Medications Marked as Taking for the 4/15/25 encounter (Office Visit) with Juan Antonio Prasad MD   Medication Sig Dispense Refill    fluticasone-umeclidin-vilant (TRELEGY ELLIPTA) 100-62.5-25 MCG/ACT AEPB inhaler Inhale 1 puff into the lungs daily 180 each 2    albuterol sulfate HFA (VENTOLIN HFA) 108 (90 Base) MCG/ACT inhaler Inhale 2 puffs into the lungs 4 times daily as needed for Wheezing 18 g 0    albuterol (PROVENTIL) (2.5 MG/3ML) 0.083% nebulizer solution Take 3 mLs by nebulization every 6 hours as needed for Wheezing 120 each 3    predniSONE (DELTASONE) 10 MG tablet Take 3 tablets daily x 4 days and then 2 tablets daily x 3 days and then stop. 10 tablet 0    buprenorphine-naloxone (SUBOXONE) 8-2 MG FILM SL film Place 1 Film under the tongue in the morning, at noon, and at bedtime.      cetirizine (ZYRTEC ALLERGY) 10 MG tablet Take 1 tablet by mouth daily      fluticasone (FLONASE) 50 MCG/ACT nasal spray 1 spray by Each Nostril route daily 16 g 0    hydroCHLOROthiazide 12.5 MG tablet Take 1 tablet by mouth daily 90 tablet 1    atorvastatin (LIPITOR) 10 MG tablet TAKE 1 TABLET BY MOUTH EVERY DAY 90 tablet 1

## 2025-04-17 DIAGNOSIS — R06.02 SHORTNESS OF BREATH: Primary | ICD-10-CM

## 2025-04-21 NOTE — TELEPHONE ENCOUNTER
Patient called and needs more prednisone said he only got 10 tablets and has 4 left. He took 3 yesterday and 3 today.Medication Pended

## 2025-04-22 RX ORDER — PREDNISONE 10 MG/1
TABLET ORAL
Qty: 5 TABLET | Refills: 0 | Status: SHIPPED | OUTPATIENT
Start: 2025-04-22

## 2025-04-29 ENCOUNTER — OFFICE VISIT (OUTPATIENT)
Age: 65
End: 2025-04-29

## 2025-04-29 VITALS
HEART RATE: 76 BPM | BODY MASS INDEX: 30.28 KG/M2 | SYSTOLIC BLOOD PRESSURE: 142 MMHG | TEMPERATURE: 98.3 F | DIASTOLIC BLOOD PRESSURE: 68 MMHG | WEIGHT: 211 LBS | OXYGEN SATURATION: 91 %

## 2025-04-29 DIAGNOSIS — J01.10 ACUTE NON-RECURRENT FRONTAL SINUSITIS: Primary | ICD-10-CM

## 2025-04-29 DIAGNOSIS — J30.2 SEASONAL ALLERGIES: ICD-10-CM

## 2025-04-29 DIAGNOSIS — R03.0 ELEVATED BLOOD PRESSURE READING: ICD-10-CM

## 2025-04-29 RX ORDER — FLUTICASONE PROPIONATE 50 MCG
1 SPRAY, SUSPENSION (ML) NASAL DAILY
Qty: 9.9 ML | Refills: 0 | Status: SHIPPED | OUTPATIENT
Start: 2025-04-29 | End: 2025-05-06

## 2025-04-29 ASSESSMENT — ENCOUNTER SYMPTOMS
VOICE CHANGE: 1
FACIAL SWELLING: 0
EYES NEGATIVE: 1
GASTROINTESTINAL NEGATIVE: 1
COUGH: 1
SHORTNESS OF BREATH: 0
SINUS PRESSURE: 1

## 2025-04-29 NOTE — PROGRESS NOTES
Jeremiah Nunez (:  1960) is a 64 y.o. male,Established patient, here for evaluation of the following chief complaint(s):  Congestion (Pt state chest congestion, stuffy nose, and drainage. Green and yellow mucus. One weeks)    MDM: Patient presents with symptoms consistent with acute bacterial sinusitis.  Physical exam and hx support this diagnosis..  Viral etiologies less likely given duration and severity of symptoms. Augmentin initiated to cover common pathogens.  Supportive care advised including hydration, nasal saline wash, and symptomatic relief discussed. He was prescribed Flonase for seasonal allergies and was educated on administration.      Jeremiah Nunez (:  1960) is a 64 y.o. male,Established patient, here for evaluation of the following chief complaint(s):  Congestion (Pt state chest congestion, stuffy nose, and drainage. Green and yellow mucus. One weeks)      ASSESSMENT/PLAN:  1. Acute non-recurrent frontal sinusitis    - amoxicillin-clavulanate (AUGMENTIN) 875-125 MG per tablet; Take 1 tablet by mouth 2 times daily for 10 days  Dispense: 20 tablet; Refill: 0    2. Elevated blood pressure reading    - Non [facility] - External Referral To Primary Care    3. Seasonal allergies    - fluticasone (FLONASE) 50 MCG/ACT nasal spray; 1 spray by Each Nostril route daily for 7 days  Dispense: 9.9 mL; Refill: 0       Return if symptoms worsen or fail to improve.    SUBJECTIVE/OBJECTIVE:  HPI:   64 y.o. male presents for complaint of sinus symptoms x 1 months now worsening over the last week. Reports he did go to the doctor on 3/23/25 but was treated for bronchitis and allergies.     Admits symptoms include  nasal congestion, chills, ear pressure, feeling feverish,  ears plugged, sore left side of neck and headache  Denies sore throat, eye drainage, nausea, vomiting, diarrhea, and abdominal pain     He has tried Zithromax and prednisone which made the symptoms better.  Denies anything makes symptoms

## 2025-04-29 NOTE — PATIENT INSTRUCTIONS
Follow up with your primary care provider as discussed.    Take medication as prescribed.    Sinus rinse    Increase fluid intake    Take a medicine like acetaminophen (sample brand name: Tylenol) or ibuprofen (sample brand names: Advil, Motrin) to help bring down your fever or for discomfort.    Continue using your Albuterol Inhaler 2 puffs inhaled into the lungs 4 times a daily as needed for wheezing

## 2025-05-11 DIAGNOSIS — E78.5 HYPERLIPIDEMIA, UNSPECIFIED HYPERLIPIDEMIA TYPE: ICD-10-CM

## 2025-05-11 DIAGNOSIS — I10 ESSENTIAL HYPERTENSION: ICD-10-CM

## 2025-05-12 RX ORDER — HYDROCHLOROTHIAZIDE 12.5 MG/1
12.5 TABLET ORAL DAILY
Qty: 90 TABLET | Refills: 1 | Status: SHIPPED | OUTPATIENT
Start: 2025-05-12

## 2025-05-12 RX ORDER — AMLODIPINE BESYLATE 5 MG/1
TABLET ORAL
Qty: 90 TABLET | Refills: 1 | Status: SHIPPED | OUTPATIENT
Start: 2025-05-12

## 2025-05-12 RX ORDER — ATORVASTATIN CALCIUM 10 MG/1
TABLET, FILM COATED ORAL
Qty: 90 TABLET | Refills: 1 | Status: SHIPPED | OUTPATIENT
Start: 2025-05-12

## 2025-05-12 NOTE — TELEPHONE ENCOUNTER
Medication:   Requested Prescriptions     Pending Prescriptions Disp Refills    hydroCHLOROthiazide 12.5 MG tablet 90 tablet 1     Sig: Take 1 tablet by mouth daily    atorvastatin (LIPITOR) 10 MG tablet 90 tablet 1     Sig: TAKE 1 TABLET BY MOUTH EVERY DAY    amLODIPine (NORVASC) 5 MG tablet 90 tablet 1     Sig: TAKE 1 TABLET BY MOUTH EVERY DAY       Last Filled:  12/23/24    Patient Phone Number: [phone] (home)     Last appt: 12/23/2024   Next appt: Visit date not found    Lab Results   Component Value Date     07/08/2024    K 4.1 07/08/2024     07/08/2024    CO2 26 07/08/2024    BUN 14 07/08/2024    CREATININE 0.6 (L) 07/08/2024    GLUCOSE 84 07/08/2024    CALCIUM 9.0 07/08/2024    BILITOT 0.3 07/08/2024    ALKPHOS 75 07/08/2024    AST 13 (L) 07/08/2024    ALT 13 07/08/2024    LABGLOM >90 07/08/2024    GFRAA >60 08/18/2022    AGRATIO 1.8 07/08/2024    GLOB 2.8 09/20/2021

## 2025-05-18 SDOH — HEALTH STABILITY: PHYSICAL HEALTH: ON AVERAGE, HOW MANY DAYS PER WEEK DO YOU ENGAGE IN MODERATE TO STRENUOUS EXERCISE (LIKE A BRISK WALK)?: 0 DAYS

## 2025-05-18 SDOH — HEALTH STABILITY: PHYSICAL HEALTH: ON AVERAGE, HOW MANY MINUTES DO YOU ENGAGE IN EXERCISE AT THIS LEVEL?: 10 MIN

## 2025-05-18 ASSESSMENT — PATIENT HEALTH QUESTIONNAIRE - PHQ9
4. FEELING TIRED OR HAVING LITTLE ENERGY: NEARLY EVERY DAY
1. LITTLE INTEREST OR PLEASURE IN DOING THINGS: MORE THAN HALF THE DAYS
2. FEELING DOWN, DEPRESSED OR HOPELESS: MORE THAN HALF THE DAYS
SUM OF ALL RESPONSES TO PHQ QUESTIONS 1-9: 14
SUM OF ALL RESPONSES TO PHQ QUESTIONS 1-9: 14
6. FEELING BAD ABOUT YOURSELF - OR THAT YOU ARE A FAILURE OR HAVE LET YOURSELF OR YOUR FAMILY DOWN: SEVERAL DAYS
8. MOVING OR SPEAKING SO SLOWLY THAT OTHER PEOPLE COULD HAVE NOTICED. OR THE OPPOSITE, BEING SO FIGETY OR RESTLESS THAT YOU HAVE BEEN MOVING AROUND A LOT MORE THAN USUAL: NOT AT ALL
10. IF YOU CHECKED OFF ANY PROBLEMS, HOW DIFFICULT HAVE THESE PROBLEMS MADE IT FOR YOU TO DO YOUR WORK, TAKE CARE OF THINGS AT HOME, OR GET ALONG WITH OTHER PEOPLE: EXTREMELY DIFFICULT
5. POOR APPETITE OR OVEREATING: SEVERAL DAYS
SUM OF ALL RESPONSES TO PHQ QUESTIONS 1-9: 14
9. THOUGHTS THAT YOU WOULD BE BETTER OFF DEAD, OR OF HURTING YOURSELF: NOT AT ALL
7. TROUBLE CONCENTRATING ON THINGS, SUCH AS READING THE NEWSPAPER OR WATCHING TELEVISION: MORE THAN HALF THE DAYS
SUM OF ALL RESPONSES TO PHQ QUESTIONS 1-9: 14
3. TROUBLE FALLING OR STAYING ASLEEP: NEARLY EVERY DAY

## 2025-05-18 ASSESSMENT — LIFESTYLE VARIABLES
HOW MANY STANDARD DRINKS CONTAINING ALCOHOL DO YOU HAVE ON A TYPICAL DAY: PATIENT DOES NOT DRINK
HOW OFTEN DO YOU HAVE SIX OR MORE DRINKS ON ONE OCCASION: 1
HOW OFTEN DO YOU HAVE A DRINK CONTAINING ALCOHOL: NEVER
HOW OFTEN DO YOU HAVE A DRINK CONTAINING ALCOHOL: 1
HOW MANY STANDARD DRINKS CONTAINING ALCOHOL DO YOU HAVE ON A TYPICAL DAY: 0

## 2025-05-20 DIAGNOSIS — J30.2 SEASONAL ALLERGIES: ICD-10-CM

## 2025-05-20 RX ORDER — FLUTICASONE PROPIONATE 50 MCG
SPRAY, SUSPENSION (ML) NASAL
Qty: 16 G | OUTPATIENT
Start: 2025-05-20

## 2025-05-21 ENCOUNTER — HOSPITAL ENCOUNTER (OUTPATIENT)
Age: 65
Discharge: HOME | End: 2025-05-23
Attending: INTERNAL MEDICINE
Payer: MEDICARE

## 2025-05-21 ENCOUNTER — HOSPITAL ENCOUNTER (OUTPATIENT)
Dept: PULMONOLOGY | Age: 65
Discharge: HOME | End: 2025-05-21
Attending: INTERNAL MEDICINE
Payer: MEDICARE

## 2025-05-21 ENCOUNTER — OFFICE VISIT (OUTPATIENT)
Dept: FAMILY MEDICINE CLINIC | Age: 65
End: 2025-05-21

## 2025-05-21 ENCOUNTER — RESULTS FOLLOW-UP (OUTPATIENT)
Dept: PULMONOLOGY | Age: 65
End: 2025-05-21

## 2025-05-21 VITALS
DIASTOLIC BLOOD PRESSURE: 64 MMHG | BODY MASS INDEX: 29.06 KG/M2 | SYSTOLIC BLOOD PRESSURE: 132 MMHG | WEIGHT: 203 LBS | HEART RATE: 68 BPM | OXYGEN SATURATION: 95 % | HEIGHT: 70 IN

## 2025-05-21 VITALS
DIASTOLIC BLOOD PRESSURE: 74 MMHG | SYSTOLIC BLOOD PRESSURE: 148 MMHG | BODY MASS INDEX: 29.06 KG/M2 | HEIGHT: 70 IN | WEIGHT: 203 LBS

## 2025-05-21 DIAGNOSIS — R06.02 SHORTNESS OF BREATH: ICD-10-CM

## 2025-05-21 DIAGNOSIS — M54.42 CHRONIC LEFT-SIDED LOW BACK PAIN WITH LEFT-SIDED SCIATICA: ICD-10-CM

## 2025-05-21 DIAGNOSIS — L84 FOOT CALLUS: ICD-10-CM

## 2025-05-21 DIAGNOSIS — M51.26 DISPLACEMENT OF LUMBAR INTERVERTEBRAL DISC WITHOUT MYELOPATHY: Primary | ICD-10-CM

## 2025-05-21 DIAGNOSIS — S16.1XXA ACUTE STRAIN OF NECK MUSCLE, INITIAL ENCOUNTER: ICD-10-CM

## 2025-05-21 DIAGNOSIS — G89.29 CHRONIC LEFT-SIDED LOW BACK PAIN WITH LEFT-SIDED SCIATICA: ICD-10-CM

## 2025-05-21 LAB
DLCO %PRED: 70 %
DLCO PRED: NORMAL
DLCO/VA %PRED: NORMAL
DLCO/VA PRED: NORMAL
DLCO/VA: NORMAL
DLCO: NORMAL
ECHO AO ASC DIAM: 3.6 CM
ECHO AO ASCENDING AORTA INDEX: 1.71 CM/M2
ECHO AO ROOT DIAM: 3.6 CM
ECHO AO ROOT INDEX: 1.71 CM/M2
ECHO AV AREA PEAK VELOCITY: 2.8 CM2
ECHO AV AREA VTI: 3 CM2
ECHO AV AREA/BSA PEAK VELOCITY: 1.3 CM2/M2
ECHO AV AREA/BSA VTI: 1.4 CM2/M2
ECHO AV MEAN GRADIENT: 8 MMHG
ECHO AV MEAN VELOCITY: 1.3 M/S
ECHO AV PEAK GRADIENT: 16 MMHG
ECHO AV PEAK VELOCITY: 2 M/S
ECHO AV VELOCITY RATIO: 0.75
ECHO AV VTI: 40.9 CM
ECHO BSA: 2.13 M2
ECHO EST RA PRESSURE: 3 MMHG
ECHO IVC EXP: 0.8 CM
ECHO IVC INSP: 1.9 CM
ECHO LA AREA 2C: 20.7 CM2
ECHO LA AREA 4C: 22.4 CM2
ECHO LA MAJOR AXIS: 6.1 CM
ECHO LA MINOR AXIS: 5.7 CM
ECHO LA VOL BP: 66 ML (ref 18–58)
ECHO LA VOL MOD A2C: 62 ML (ref 18–58)
ECHO LA VOL MOD A4C: 67 ML (ref 18–58)
ECHO LA VOL/BSA BIPLANE: 31 ML/M2 (ref 16–34)
ECHO LA VOLUME INDEX MOD A2C: 30 ML/M2 (ref 16–34)
ECHO LA VOLUME INDEX MOD A4C: 32 ML/M2 (ref 16–34)
ECHO LV E' LATERAL VELOCITY: 12.2 CM/S
ECHO LV E' SEPTAL VELOCITY: 9 CM/S
ECHO LV EDV A2C: 143 ML
ECHO LV EDV A4C: 110 ML
ECHO LV EDV INDEX A4C: 52 ML/M2
ECHO LV EDV NDEX A2C: 68 ML/M2
ECHO LV EF PHYSICIAN: 60 %
ECHO LV EJECTION FRACTION A2C: 59 %
ECHO LV EJECTION FRACTION A4C: 55 %
ECHO LV EJECTION FRACTION BIPLANE: 58 % (ref 55–100)
ECHO LV ESV A2C: 58 ML
ECHO LV ESV A4C: 50 ML
ECHO LV ESV INDEX A2C: 28 ML/M2
ECHO LV ESV INDEX A4C: 24 ML/M2
ECHO LV FRACTIONAL SHORTENING: 35 % (ref 28–44)
ECHO LV INTERNAL DIMENSION DIASTOLE INDEX: 2.71 CM/M2
ECHO LV INTERNAL DIMENSION DIASTOLIC: 5.7 CM (ref 4.2–5.9)
ECHO LV INTERNAL DIMENSION SYSTOLIC INDEX: 1.76 CM/M2
ECHO LV INTERNAL DIMENSION SYSTOLIC: 3.7 CM
ECHO LV IVSD: 0.8 CM (ref 0.6–1)
ECHO LV MASS 2D: 183.7 G (ref 88–224)
ECHO LV MASS INDEX 2D: 87.5 G/M2 (ref 49–115)
ECHO LV POSTERIOR WALL DIASTOLIC: 0.9 CM (ref 0.6–1)
ECHO LV RELATIVE WALL THICKNESS RATIO: 0.32
ECHO LVOT AREA: 3.8 CM2
ECHO LVOT AV VTI INDEX: 0.78
ECHO LVOT DIAM: 2.2 CM
ECHO LVOT MEAN GRADIENT: 4 MMHG
ECHO LVOT PEAK GRADIENT: 9 MMHG
ECHO LVOT PEAK VELOCITY: 1.5 M/S
ECHO LVOT STROKE VOLUME INDEX: 57.7 ML/M2
ECHO LVOT SV: 121.2 ML
ECHO LVOT VTI: 31.9 CM
ECHO MV A VELOCITY: 0.56 M/S
ECHO MV E VELOCITY: 0.99 M/S
ECHO MV E/A RATIO: 1.77
ECHO MV E/E' LATERAL: 8.11
ECHO MV E/E' RATIO (AVERAGED): 9.56
ECHO MV E/E' SEPTAL: 11
ECHO PV MAX VELOCITY: 1.3 M/S
ECHO PV PEAK GRADIENT: 7 MMHG
ECHO RA AREA 4C: 23.4 CM2
ECHO RA END SYSTOLIC VOLUME APICAL 4 CHAMBER INDEX BSA: 34 ML/M2
ECHO RA VOLUME: 72 ML
ECHO RV BASAL DIMENSION: 4.4 CM
ECHO RV LONGITUDINAL DIMENSION: 8.4 CM
ECHO RV MID DIMENSION: 3.2 CM
EXPIRATORY TIME-POST: NORMAL
EXPIRATORY TIME: NORMAL
FEF 25-75 %CHNG: NORMAL
FEF 25-75 POST %PRED: NORMAL
FEF 25-75% %PRED-PRE: NORMAL
FEF 25-75% PRED: NORMAL
FEF 25-75-POST: NORMAL
FEF 25-75-PRE: NORMAL
FEV1 %PRED-POST: 71 %
FEV1 %PRED-PRE: 61 %
FEV1 PRED: NORMAL
FEV1-POST: NORMAL
FEV1-PRE: NORMAL
FEV1/FVC %PRED-POST: NORMAL
FEV1/FVC %PRED-PRE: NORMAL
FEV1/FVC PRED: NORMAL
FEV1/FVC-POST: 85 %
FEV1/FVC-PRE: 81 %
FVC %PRED-POST: NORMAL
FVC %PRED-PRE: NORMAL
FVC PRED: NORMAL
FVC-POST: NORMAL
FVC-PRE: NORMAL
GAW %PRED: NORMAL
GAW PRED: NORMAL
GAW: NORMAL
IC PRE %PRED: NORMAL
IC PRED: NORMAL
IC: NORMAL
MEP: NORMAL
MIP: NORMAL
MVV %PRED-PRE: NORMAL
MVV PRED: NORMAL
MVV-PRE: NORMAL
PEF %PRED-POST: NORMAL
PEF %PRED-PRE: NORMAL
PEF PRED: NORMAL
PEF%CHNG: NORMAL
PEF-POST: NORMAL
PEF-PRE: NORMAL
RAW %PRED: NORMAL
RAW PRED: NORMAL
RAW: NORMAL
RV PRE %PRED: NORMAL
RV PRED: NORMAL
RV: NORMAL
SVC %PRED: NORMAL
SVC PRED: NORMAL
SVC: NORMAL
TLC PRE %PRED: 86 %
TLC PRED: NORMAL
TLC: NORMAL
VA %PRED: NORMAL
VA PRED: NORMAL
VA: NORMAL
VTG %PRED: NORMAL
VTG PRED: NORMAL
VTG: NORMAL

## 2025-05-21 PROCEDURE — 94729 DIFFUSING CAPACITY: CPT

## 2025-05-21 PROCEDURE — 94060 EVALUATION OF WHEEZING: CPT

## 2025-05-21 PROCEDURE — 93306 TTE W/DOPPLER COMPLETE: CPT

## 2025-05-21 PROCEDURE — 6370000000 HC RX 637 (ALT 250 FOR IP): Performed by: INTERNAL MEDICINE

## 2025-05-21 PROCEDURE — 93306 TTE W/DOPPLER COMPLETE: CPT | Performed by: INTERNAL MEDICINE

## 2025-05-21 PROCEDURE — 94760 N-INVAS EAR/PLS OXIMETRY 1: CPT

## 2025-05-21 PROCEDURE — 94726 PLETHYSMOGRAPHY LUNG VOLUMES: CPT

## 2025-05-21 RX ORDER — ALBUTEROL SULFATE 90 UG/1
4 INHALANT RESPIRATORY (INHALATION) EVERY 6 HOURS PRN
Status: DISCONTINUED | OUTPATIENT
Start: 2025-05-21 | End: 2025-05-22 | Stop reason: HOSPADM

## 2025-05-21 RX ADMIN — Medication 4 PUFF: at 14:25

## 2025-05-21 ASSESSMENT — PULMONARY FUNCTION TESTS
FEV1/FVC_POST: 85
FEV1_PERCENT_PREDICTED_PRE: 61
FEV1_PERCENT_PREDICTED_POST: 71
FEV1/FVC_PRE: 81

## 2025-05-21 NOTE — PROGRESS NOTES
patient's medical/surgical/family/social in detail and updated the computerized patient record as appropriate.    Current Outpatient Medications   Medication Sig Dispense Refill    hydroCHLOROthiazide 12.5 MG tablet Take 1 tablet by mouth daily 90 tablet 1    atorvastatin (LIPITOR) 10 MG tablet TAKE 1 TABLET BY MOUTH EVERY DAY 90 tablet 1    amLODIPine (NORVASC) 5 MG tablet TAKE 1 TABLET BY MOUTH EVERY DAY 90 tablet 1    fluticasone-umeclidin-vilant (TRELEGY ELLIPTA) 100-62.5-25 MCG/ACT AEPB inhaler Inhale 1 puff into the lungs daily 180 each 2    albuterol (PROVENTIL) (2.5 MG/3ML) 0.083% nebulizer solution Take 3 mLs by nebulization every 6 hours as needed for Wheezing 120 each 3    cetirizine (ZYRTEC ALLERGY) 10 MG tablet Take 1 tablet by mouth daily      fluticasone (FLONASE) 50 MCG/ACT nasal spray 1 spray by Each Nostril route daily for 7 days 9.9 mL 0    predniSONE (DELTASONE) 10 MG tablet Take 2 tablets for 1 day and 1 tablet for 3 days and then stop. 5 tablet 0    albuterol sulfate HFA (VENTOLIN HFA) 108 (90 Base) MCG/ACT inhaler Inhale 2 puffs into the lungs 4 times daily as needed for Wheezing (Patient not taking: Reported on 5/21/2025) 18 g 0    buprenorphine-naloxone (SUBOXONE) 8-2 MG FILM SL film Place 1 Film under the tongue in the morning, at noon, and at bedtime.      pantoprazole (PROTONIX) 40 MG tablet TAKE 1 TABLET BY MOUTH DAILY WITH BREAKFAST (Patient not taking: Reported on 5/21/2025) 30 tablet 3     No current facility-administered medications for this visit.       Past Medical History:   Diagnosis Date    Alcoholism ([facility]) 07/14/1999    Anxiety     COPD (chronic obstructive pulmonary disease) ([facility])     Hyperlipidemia     Low HDL (under 40)     Major depressive disorder, recurrent episode, severe ([facility]) 01/20/2015    Prolonged emergence from general anesthesia     \"hard time breathing when waking up'    Spinal arthritis 2005     Past Surgical History:   Procedure Laterality Date    COLONOSCOPY

## 2025-05-22 ENCOUNTER — TELEPHONE (OUTPATIENT)
Dept: PULMONOLOGY | Age: 65
End: 2025-05-22

## 2025-05-22 NOTE — PROCEDURES
Pulmonary Function Testing      Patient name:  Jeremiah Nunez      Unit #:   [id number]   Date of test: 5/21/2025  Date of interpretation:   5/22/2025    Mr. Jeremiah Nunez is a 64 y.o. year-old.  The spirometry data were acceptable and reproducible.     Spirometry:  Flow volume loops were obstructed. The FEV-1/FVC ratio was decreased . The  post-bronchodilator FEV-1 was 2.36 liters (71% of predicted) (Z score: -1.79), which was mildly decreased. The FVC was 3.56 liters (83% of predicted) (Z score: -1.10), which was normal. Response to inhaled bronchodilators (albuterol) was not significant.    Lung volumes:  Lung volumes were tested by plethysmography. The total lung capacity was 6.03 liters (86% of predicted), which was normal. The residual volume was 2.41 liters (104% of predicted), which was normal. The ratio of residual volume to total lung capacity (RV/TLC) was 119, which was normal.     Diffusion capacity was found to be (70% of predicted) which was decreased.      Interpretation:  Mild obstructive airway disease with reduced diffusion capacity.      Janie Ba MD  [facility]   [address], [address], [place] [address]    Z-scores are recommended over fixed percent predicted thresholds to classify the severity of airflow obstruction.  Using Z-scores can reduce age and height bias, and the recommended threshold correlate with mortality risk.  The recommended Z-score threshold for different severities of airflow obstruction are:    Mild: -1.645 to -2.5  Moderate: -2.5 to -4.0  Severe:> -4.0

## 2025-05-29 ENCOUNTER — OFFICE VISIT (OUTPATIENT)
Age: 65
End: 2025-05-29

## 2025-05-29 VITALS
OXYGEN SATURATION: 94 % | HEIGHT: 70 IN | SYSTOLIC BLOOD PRESSURE: 130 MMHG | DIASTOLIC BLOOD PRESSURE: 80 MMHG | HEART RATE: 82 BPM | TEMPERATURE: 97.9 F | BODY MASS INDEX: 28.06 KG/M2 | WEIGHT: 196 LBS

## 2025-05-29 DIAGNOSIS — S16.1XXA STRAIN OF NECK MUSCLE, INITIAL ENCOUNTER: ICD-10-CM

## 2025-05-29 DIAGNOSIS — N45.1 EPIDIDYMITIS, LEFT: ICD-10-CM

## 2025-05-29 DIAGNOSIS — N50.812 TESTICULAR PAIN, LEFT: Primary | ICD-10-CM

## 2025-05-29 LAB
BILIRUBIN, POC: NEGATIVE
BLOOD URINE, POC: NEGATIVE
CLARITY, POC: CLEAR
COLOR, POC: ABNORMAL
GLUCOSE URINE, POC: NEGATIVE MG/DL
KETONES, POC: NEGATIVE MG/DL
LEUKOCYTE EST, POC: NEGATIVE
NITRITE, POC: NEGATIVE
PH, POC: 6
PROTEIN, POC: ABNORMAL MG/DL
SPECIFIC GRAVITY, POC: 1.03
UROBILINOGEN, POC: 1 MG/DL

## 2025-05-29 RX ORDER — METHOCARBAMOL 750 MG/1
750 TABLET, FILM COATED ORAL 4 TIMES DAILY
Qty: 40 TABLET | Refills: 0 | Status: SHIPPED | OUTPATIENT
Start: 2025-05-29 | End: 2025-06-08

## 2025-05-29 RX ORDER — CLINDAMYCIN HYDROCHLORIDE 300 MG/1
300 CAPSULE ORAL 3 TIMES DAILY
Qty: 21 CAPSULE | Refills: 0 | Status: SHIPPED | OUTPATIENT
Start: 2025-05-29 | End: 2025-06-05

## 2025-05-29 NOTE — PROGRESS NOTES
Jeremiah Nunez (:  1960) is a 64 y.o. male,Established patient, here for evaluation of the following chief complaint(s):  Testicle Pain (Left testicle pain for three days. )      Assessment & Plan :  Visit Diagnoses and Associated Orders         Testicular pain, left    -  Primary    POCT Urinalysis no Micro [97340 Custom]      Culture, Urine [[id number] Custom]             Epididymitis, left        clindamycin (CLEOCIN) 300 MG capsule [9621]             Strain of neck muscle, initial encounter        methocarbamol (ROBAXIN) 750 MG tablet [4972]           ORDERS WITHOUT AN ASSOCIATED DIAGNOSIS    AMOXICILLIN PO [82685]            Patient was seen and evaluated for above symptoms.  Urinalysis revealed protein.  Assessment consistent with epididymitis and strain of neck muscle.  Prescribed Robaxin 4 times daily as needed for neck pain.  Differential diagnosis for left testicle pain, Urinary tract infection, orchitis.  Prescribed Cipro 3 times daily for 7 days.  Instructed to take this medication until completed.  Discussed we will call you with your lab results if a change is needed and treatment or additional treatment is needed.  Continue to push oral fluids.  May take Tylenol ibuprofen for discomfort.  Recommended icing and elevation of testicle.  Instructed to follow-up with primary care provider in 3 to 5 days if symptoms worsen or fail to improve.  Patient verbalized and agreed to plan of care.         Subjective :    Testicle Pain       64 y.o. male presents with symptoms of left testicle pain.  Reports onset of symptoms about 3 days ago.  Mentions it kind of feels as if somebody is using his left testicle.  Mentions that he feels that he feels like tubule is hard.  Endorses and feeling like Bag warms.  Denies any concerns of sexually transmitted diseases.  Denies any urinary urgency, frequency or dysuria.  Also reports he has been working on a room and been using a jackhammer as a bit of left neck pain.

## 2025-05-29 NOTE — PATIENT INSTRUCTIONS
Prescribed Clindamycin three times daily. Take this medication until completed.    We will call you with your lab results if a change is needed and treatment or additional treatment is needed.  Continue to push oral fluids.  May take Tylenol and ibuprofen for discomfort. Rice and elevate testicle. Follow-up with primary care provider in 3 to 5 days if symptoms worsen or fail to improve.

## 2025-05-31 ENCOUNTER — RESULTS FOLLOW-UP (OUTPATIENT)
Age: 65
End: 2025-05-31

## 2025-05-31 LAB — BACTERIA UR CULT: NORMAL

## 2025-06-05 ENCOUNTER — OFFICE VISIT (OUTPATIENT)
Dept: PULMONOLOGY | Age: 65
End: 2025-06-05
Payer: MEDICARE

## 2025-06-05 VITALS
HEART RATE: 71 BPM | SYSTOLIC BLOOD PRESSURE: 136 MMHG | BODY MASS INDEX: 28.92 KG/M2 | HEIGHT: 70 IN | OXYGEN SATURATION: 95 % | DIASTOLIC BLOOD PRESSURE: 78 MMHG | WEIGHT: 202 LBS

## 2025-06-05 DIAGNOSIS — J44.1 COPD WITH ACUTE EXACERBATION (HCC): Primary | ICD-10-CM

## 2025-06-05 DIAGNOSIS — T17.500A MUCUS PLUGGING OF BRONCHI: ICD-10-CM

## 2025-06-05 PROCEDURE — G2211 COMPLEX E/M VISIT ADD ON: HCPCS | Performed by: INTERNAL MEDICINE

## 2025-06-05 PROCEDURE — 4004F PT TOBACCO SCREEN RCVD TLK: CPT | Performed by: INTERNAL MEDICINE

## 2025-06-05 PROCEDURE — G8417 CALC BMI ABV UP PARAM F/U: HCPCS | Performed by: INTERNAL MEDICINE

## 2025-06-05 PROCEDURE — 3078F DIAST BP <80 MM HG: CPT | Performed by: INTERNAL MEDICINE

## 2025-06-05 PROCEDURE — 3023F SPIROM DOC REV: CPT | Performed by: INTERNAL MEDICINE

## 2025-06-05 PROCEDURE — G8427 DOCREV CUR MEDS BY ELIG CLIN: HCPCS | Performed by: INTERNAL MEDICINE

## 2025-06-05 PROCEDURE — 3017F COLORECTAL CA SCREEN DOC REV: CPT | Performed by: INTERNAL MEDICINE

## 2025-06-05 PROCEDURE — 3075F SYST BP GE 130 - 139MM HG: CPT | Performed by: INTERNAL MEDICINE

## 2025-06-05 PROCEDURE — 99214 OFFICE O/P EST MOD 30 MIN: CPT | Performed by: INTERNAL MEDICINE

## 2025-06-05 RX ORDER — GUAIFENESIN 600 MG/1
600 TABLET, EXTENDED RELEASE ORAL 2 TIMES DAILY
Qty: 60 TABLET | Refills: 0 | Status: SHIPPED | OUTPATIENT
Start: 2025-06-05

## 2025-06-05 ASSESSMENT — ENCOUNTER SYMPTOMS
DIARRHEA: 0
SHORTNESS OF BREATH: 1
RHINORRHEA: 0
BLOOD IN STOOL: 0
STRIDOR: 0
ABDOMINAL PAIN: 0
BACK PAIN: 0
WHEEZING: 1
COUGH: 1
SINUS PRESSURE: 0
APNEA: 0
COLOR CHANGE: 0
ABDOMINAL DISTENTION: 0
CHOKING: 0
SORE THROAT: 0
VOMITING: 0
CHEST TIGHTNESS: 1
VOICE CHANGE: 0
CONSTIPATION: 0

## 2025-06-05 NOTE — PROGRESS NOTES
Jeremiah Nunez    YOB: 1960     Date of Service:  6/5/2025     Chief Complaint   Patient presents with    1 Month Follow-Up       HPI patient has had several exacerbations of COPD.  Still has chest congestion and left-sided wheezing/rhonchi.  States that he is unable to expectorate much phlegm.  Has received several courses of antibiotics and steroids.  Wants to know the results of recently performed PFT on 5/21.    Allergies   Allergen Reactions    Nucynta [Tapentadol] Anaphylaxis     Hallucinations. Went to hospital.    Vicodin [Hydrocodone-Acetaminophen]      Itching      Codeine Rash     itching    Tylenol [Acetaminophen] Rash     Itching, pt states he is able to take     Outpatient Medications Marked as Taking for the 6/5/25 encounter (Office Visit) with Juan Antonio Prasad MD   Medication Sig Dispense Refill    guaiFENesin (MUCINEX) 600 MG extended release tablet Take 1 tablet by mouth 2 times daily 60 tablet 0    clindamycin (CLEOCIN) 300 MG capsule Take 1 capsule by mouth 3 times daily for 7 days 21 capsule 0    methocarbamol (ROBAXIN) 750 MG tablet Take 1 tablet by mouth 4 times daily for 10 days 40 tablet 0    hydroCHLOROthiazide 12.5 MG tablet Take 1 tablet by mouth daily 90 tablet 1    atorvastatin (LIPITOR) 10 MG tablet TAKE 1 TABLET BY MOUTH EVERY DAY 90 tablet 1    amLODIPine (NORVASC) 5 MG tablet TAKE 1 TABLET BY MOUTH EVERY DAY 90 tablet 1    fluticasone-umeclidin-vilant (TRELEGY ELLIPTA) 100-62.5-25 MCG/ACT AEPB inhaler Inhale 1 puff into the lungs daily 180 each 2    albuterol (PROVENTIL) (2.5 MG/3ML) 0.083% nebulizer solution Take 3 mLs by nebulization every 6 hours as needed for Wheezing 120 each 3       Immunization History   Administered Date(s) Administered    COVID-19, PFIZER PURPLE top, DILUTE for use, (age 12 y+), 30mcg/0.3mL 03/16/2021, 04/06/2021, 10/07/2021       Past Medical History:   Diagnosis Date    Alcoholism ([facility]) 07/14/1999    Anxiety     COPD (chronic

## 2025-06-13 ENCOUNTER — OFFICE VISIT (OUTPATIENT)
Dept: FAMILY MEDICINE CLINIC | Age: 65
End: 2025-06-13

## 2025-06-13 VITALS
HEART RATE: 83 BPM | DIASTOLIC BLOOD PRESSURE: 79 MMHG | HEIGHT: 70 IN | SYSTOLIC BLOOD PRESSURE: 131 MMHG | OXYGEN SATURATION: 96 % | WEIGHT: 203.6 LBS | BODY MASS INDEX: 29.15 KG/M2

## 2025-06-13 DIAGNOSIS — M50.30 DEGENERATION OF INTERVERTEBRAL DISC OF CERVICAL REGION: ICD-10-CM

## 2025-06-13 DIAGNOSIS — Z12.5 PROSTATE CANCER SCREENING: ICD-10-CM

## 2025-06-13 DIAGNOSIS — R79.9 ABNORMAL FINDING OF BLOOD CHEMISTRY, UNSPECIFIED: ICD-10-CM

## 2025-06-13 DIAGNOSIS — Z11.3 SCREEN FOR STD (SEXUALLY TRANSMITTED DISEASE): ICD-10-CM

## 2025-06-13 DIAGNOSIS — M54.2 CHRONIC NECK PAIN: Primary | ICD-10-CM

## 2025-06-13 DIAGNOSIS — I10 ESSENTIAL HYPERTENSION: ICD-10-CM

## 2025-06-13 DIAGNOSIS — E78.5 HYPERLIPIDEMIA, UNSPECIFIED HYPERLIPIDEMIA TYPE: ICD-10-CM

## 2025-06-13 DIAGNOSIS — G89.29 CHRONIC NECK PAIN: Primary | ICD-10-CM

## 2025-06-13 LAB
ALBUMIN SERPL-MCNC: 4.3 G/DL (ref 3.4–5)
ALBUMIN/GLOB SERPL: 1.9 {RATIO} (ref 1.1–2.2)
ALP SERPL-CCNC: 84 U/L (ref 40–129)
ALT SERPL-CCNC: 25 U/L (ref 10–40)
ANION GAP SERPL CALCULATED.3IONS-SCNC: 9 MMOL/L (ref 3–16)
AST SERPL-CCNC: 23 U/L (ref 15–37)
BASOPHILS # BLD: 0 K/UL (ref 0–0.2)
BASOPHILS NFR BLD: 0.5 %
BILIRUB SERPL-MCNC: 0.9 MG/DL (ref 0–1)
BUN SERPL-MCNC: 12 MG/DL (ref 7–20)
CALCIUM SERPL-MCNC: 9.5 MG/DL (ref 8.3–10.6)
CHLORIDE SERPL-SCNC: 100 MMOL/L (ref 99–110)
CHOLEST SERPL-MCNC: 123 MG/DL (ref 0–199)
CO2 SERPL-SCNC: 29 MMOL/L (ref 21–32)
CREAT SERPL-MCNC: 0.6 MG/DL (ref 0.8–1.3)
DEPRECATED RDW RBC AUTO: 14.4 % (ref 12.4–15.4)
EOSINOPHIL # BLD: 0.1 K/UL (ref 0–0.6)
EOSINOPHIL NFR BLD: 1.6 %
EST. AVERAGE GLUCOSE BLD GHB EST-MCNC: 114 MG/DL
GFR SERPLBLD CREATININE-BSD FMLA CKD-EPI: >90 ML/MIN/{1.73_M2}
GLUCOSE SERPL-MCNC: 132 MG/DL (ref 70–99)
HBA1C MFR BLD: 5.6 %
HCT VFR BLD AUTO: 44.2 % (ref 40.5–52.5)
HCV AB SERPL QL IA: NORMAL
HDLC SERPL-MCNC: 43 MG/DL (ref 40–60)
HGB BLD-MCNC: 15.4 G/DL (ref 13.5–17.5)
LDLC SERPL CALC-MCNC: 64 MG/DL
LYMPHOCYTES # BLD: 1.2 K/UL (ref 1–5.1)
LYMPHOCYTES NFR BLD: 14.1 %
MCH RBC QN AUTO: 31 PG (ref 26–34)
MCHC RBC AUTO-ENTMCNC: 34.8 G/DL (ref 31–36)
MCV RBC AUTO: 89 FL (ref 80–100)
MONOCYTES # BLD: 0.5 K/UL (ref 0–1.3)
MONOCYTES NFR BLD: 5.8 %
NEUTROPHILS # BLD: 6.8 K/UL (ref 1.7–7.7)
NEUTROPHILS NFR BLD: 78 %
PLATELET # BLD AUTO: 288 K/UL (ref 135–450)
PMV BLD AUTO: 9 FL (ref 5–10.5)
POTASSIUM SERPL-SCNC: 4.2 MMOL/L (ref 3.5–5.1)
PROT SERPL-MCNC: 6.6 G/DL (ref 6.4–8.2)
PSA SERPL DL<=0.01 NG/ML-MCNC: 1.4 NG/ML (ref 0–4)
RBC # BLD AUTO: 4.97 M/UL (ref 4.2–5.9)
SODIUM SERPL-SCNC: 138 MMOL/L (ref 136–145)
SPECIMEN TYPE: NORMAL
TRICHOMONAS VAGINALIS SCREEN: NEGATIVE
TRIGL SERPL-MCNC: 78 MG/DL (ref 0–150)
TSH SERPL DL<=0.005 MIU/L-ACNC: 1.76 UIU/ML (ref 0.27–4.2)
VLDLC SERPL CALC-MCNC: 16 MG/DL
WBC # BLD AUTO: 8.7 K/UL (ref 4–11)

## 2025-06-13 RX ORDER — OXYCODONE HYDROCHLORIDE 10 MG/1
10 TABLET ORAL EVERY 6 HOURS PRN
Qty: 120 TABLET | Refills: 0 | Status: SHIPPED | OUTPATIENT
Start: 2025-06-13 | End: 2025-07-13

## 2025-06-13 SDOH — ECONOMIC STABILITY: FOOD INSECURITY: WITHIN THE PAST 12 MONTHS, YOU WORRIED THAT YOUR FOOD WOULD RUN OUT BEFORE YOU GOT MONEY TO BUY MORE.: NEVER TRUE

## 2025-06-13 SDOH — ECONOMIC STABILITY: FOOD INSECURITY: WITHIN THE PAST 12 MONTHS, THE FOOD YOU BOUGHT JUST DIDN'T LAST AND YOU DIDN'T HAVE MONEY TO GET MORE.: NEVER TRUE

## 2025-06-13 ASSESSMENT — PATIENT HEALTH QUESTIONNAIRE - PHQ9
SUM OF ALL RESPONSES TO PHQ QUESTIONS 1-9: 0
1. LITTLE INTEREST OR PLEASURE IN DOING THINGS: NOT AT ALL
SUM OF ALL RESPONSES TO PHQ QUESTIONS 1-9: 0
SUM OF ALL RESPONSES TO PHQ QUESTIONS 1-9: 0
2. FEELING DOWN, DEPRESSED OR HOPELESS: NOT AT ALL
SUM OF ALL RESPONSES TO PHQ QUESTIONS 1-9: 0

## 2025-06-13 NOTE — PROGRESS NOTES
Jeremiah Nunez is a 64 y.o. year old male here for:    Chief Complaint:    Chief Complaint   Patient presents with    New Patient       Subjective:         HPI:     Patient presenting to reestablish care as previous physician retired.  Suffering from worsening neck pain that has been chronic for many years.  Trying to find a physician that is [employer] qualified.  Recent bout of epididymitis.  Wants to be STD tested.  Needs refill of pain medication as he tries to find a [employer] pain management physician.    Past Medical History:   Diagnosis Date    Alcoholism ([facility]) 1999    Anxiety     COPD (chronic obstructive pulmonary disease) ([facility])     Hyperlipidemia     Low HDL (under 40)     Major depressive disorder, recurrent episode, severe ([facility]) 2015    Prolonged emergence from general anesthesia     \"hard time breathing when waking up'    Spinal arthritis      Social History     Tobacco Use    Smoking status: Every Day     Current packs/day: 1.50     Average packs/day: 1.5 packs/day for 48.4 years (72.7 ttl pk-yrs)     Types: Cigarettes     Start date: 1977     Passive exposure: Current    Smokeless tobacco: Never   Vaping Use    Vaping status: Never Used   Substance Use Topics    Alcohol use: Not Currently     Alcohol/week: 2.0 standard drinks of alcohol     Types: 2 Cans of beer per week     Comment: former heavy use; recovering alcoholic since , approx    Drug use: No     Family History   Problem Relation Age of Onset    Heart Disease Mother     High Blood Pressure Mother     Cancer Mother         female    Lung Cancer Mother 79        stage 4, age 79 after 4-5 months, 3/2021     Past Surgical History:   Procedure Laterality Date    COLONOSCOPY  10/18/2012    COLONOSCOPY N/A 2021    Dr Laura:: 2 polyps in cecum 10 mm polyp mid sigmoid, mild diverticulosis    SPINAL FUSION  10/2006    SPINE SURGERY      [id number] hardware removal    TYMPANOSTOMY

## 2025-06-14 LAB
HIV 1+2 AB+HIV1 P24 AG SERPL QL IA: NORMAL
HIV 2 AB SERPL QL IA: NORMAL
HIV1 AB SERPL QL IA: NORMAL
HIV1 P24 AG SERPL QL IA: NORMAL
REAGIN+T PALLIDUM IGG+IGM SERPL-IMP: NORMAL

## 2025-06-15 LAB
C TRACH DNA UR QL NAA+PROBE: NEGATIVE
N GONORRHOEA DNA UR QL NAA+PROBE: NEGATIVE

## 2025-06-15 SDOH — HEALTH STABILITY: PHYSICAL HEALTH: ON AVERAGE, HOW MANY MINUTES DO YOU ENGAGE IN EXERCISE AT THIS LEVEL?: 0 MIN

## 2025-06-15 SDOH — HEALTH STABILITY: PHYSICAL HEALTH: ON AVERAGE, HOW MANY DAYS PER WEEK DO YOU ENGAGE IN MODERATE TO STRENUOUS EXERCISE (LIKE A BRISK WALK)?: 0 DAYS

## 2025-06-15 ASSESSMENT — LIFESTYLE VARIABLES
HOW MANY STANDARD DRINKS CONTAINING ALCOHOL DO YOU HAVE ON A TYPICAL DAY: 0
HOW OFTEN DO YOU HAVE A DRINK CONTAINING ALCOHOL: NEVER
HOW MANY STANDARD DRINKS CONTAINING ALCOHOL DO YOU HAVE ON A TYPICAL DAY: PATIENT DOES NOT DRINK
HOW OFTEN DO YOU HAVE A DRINK CONTAINING ALCOHOL: 1
HOW OFTEN DO YOU HAVE SIX OR MORE DRINKS ON ONE OCCASION: 1

## 2025-06-15 ASSESSMENT — PATIENT HEALTH QUESTIONNAIRE - PHQ9
SUM OF ALL RESPONSES TO PHQ QUESTIONS 1-9: 10
SUM OF ALL RESPONSES TO PHQ QUESTIONS 1-9: 10
3. TROUBLE FALLING OR STAYING ASLEEP: MORE THAN HALF THE DAYS
10. IF YOU CHECKED OFF ANY PROBLEMS, HOW DIFFICULT HAVE THESE PROBLEMS MADE IT FOR YOU TO DO YOUR WORK, TAKE CARE OF THINGS AT HOME, OR GET ALONG WITH OTHER PEOPLE: SOMEWHAT DIFFICULT
8. MOVING OR SPEAKING SO SLOWLY THAT OTHER PEOPLE COULD HAVE NOTICED. OR THE OPPOSITE, BEING SO FIGETY OR RESTLESS THAT YOU HAVE BEEN MOVING AROUND A LOT MORE THAN USUAL: MORE THAN HALF THE DAYS
9. THOUGHTS THAT YOU WOULD BE BETTER OFF DEAD, OR OF HURTING YOURSELF: NOT AT ALL
6. FEELING BAD ABOUT YOURSELF - OR THAT YOU ARE A FAILURE OR HAVE LET YOURSELF OR YOUR FAMILY DOWN: NOT AT ALL
2. FEELING DOWN, DEPRESSED OR HOPELESS: SEVERAL DAYS
1. LITTLE INTEREST OR PLEASURE IN DOING THINGS: MORE THAN HALF THE DAYS
5. POOR APPETITE OR OVEREATING: NOT AT ALL
7. TROUBLE CONCENTRATING ON THINGS, SUCH AS READING THE NEWSPAPER OR WATCHING TELEVISION: SEVERAL DAYS
4. FEELING TIRED OR HAVING LITTLE ENERGY: MORE THAN HALF THE DAYS
SUM OF ALL RESPONSES TO PHQ QUESTIONS 1-9: 10
SUM OF ALL RESPONSES TO PHQ QUESTIONS 1-9: 10

## 2025-06-18 ENCOUNTER — APPOINTMENT (OUTPATIENT)
Dept: CT IMAGING | Age: 65
End: 2025-06-18
Attending: INTERNAL MEDICINE
Payer: MEDICARE

## 2025-06-18 ENCOUNTER — OFFICE VISIT (OUTPATIENT)
Dept: FAMILY MEDICINE CLINIC | Age: 65
End: 2025-06-18

## 2025-06-18 VITALS
OXYGEN SATURATION: 94 % | DIASTOLIC BLOOD PRESSURE: 78 MMHG | HEART RATE: 86 BPM | HEIGHT: 70 IN | TEMPERATURE: 98.6 F | BODY MASS INDEX: 30.32 KG/M2 | SYSTOLIC BLOOD PRESSURE: 132 MMHG | WEIGHT: 211.8 LBS

## 2025-06-18 DIAGNOSIS — M50.30 DEGENERATION OF INTERVERTEBRAL DISC OF CERVICAL REGION: ICD-10-CM

## 2025-06-18 DIAGNOSIS — I10 ESSENTIAL HYPERTENSION: ICD-10-CM

## 2025-06-18 DIAGNOSIS — G89.29 CHRONIC NECK PAIN: ICD-10-CM

## 2025-06-18 DIAGNOSIS — E78.5 HYPERLIPIDEMIA, UNSPECIFIED HYPERLIPIDEMIA TYPE: ICD-10-CM

## 2025-06-18 DIAGNOSIS — M54.2 CHRONIC NECK PAIN: ICD-10-CM

## 2025-06-18 DIAGNOSIS — N50.812 PAIN IN LEFT TESTICLE: ICD-10-CM

## 2025-06-18 DIAGNOSIS — Z00.00 MEDICARE ANNUAL WELLNESS VISIT, SUBSEQUENT: Primary | ICD-10-CM

## 2025-06-18 PROBLEM — F11.20 OPIOID DEPENDENCE WITH CURRENT USE (HCC): Status: RESOLVED | Noted: 2023-08-25 | Resolved: 2025-06-18

## 2025-06-18 NOTE — PATIENT INSTRUCTIONS
about a medical condition or this instruction, always ask your healthcare professional. [employer], disclaims any warranty or liability for your use of this information.         Starting a Weight-Loss Plan: Care Instructions  Overview    It can be a challenge to lose weight. But your doctor can help you make a weight-loss plan that meets your needs.  You don't have to make a lot of big changes at once. A better idea might be to focus on small changes and stick with them. When those changes become habit, you can add a few more changes.  Some people find it helpful to take an exercise or nutrition class. If you have questions, ask your doctor about seeing a registered dietitian or an exercise specialist. You might also think about joining a weight-loss support group.  If you're not ready to make changes right now, try to pick a date in the future. Then make an appointment with your doctor to talk about when and how you'll get started with a plan.  Follow-up care is a key part of your treatment and safety. Be sure to make and go to all appointments, and call your doctor if you are having problems. It's also a good idea to know your test results and keep a list of the medicines you take.  How can you care for yourself as you start a weight-loss plan?   Set realistic goals. Many people expect to lose much more weight than is likely. A weight loss of 5% to 10% of your body weight may be enough to improve your health.  Get family and friends involved to provide support. Talk to them about why you are trying to lose weight, and ask them to help. They can help by participating in exercise and having meals with you, even if they may be eating something different.  Find what works best for you. If you do not have time or do not like to cook, a program that offers meal replacement bars or shakes may be better for you. Or if you like to prepare meals, finding a plan that includes daily menus and recipes may be

## 2025-06-18 NOTE — PROGRESS NOTES
Medicare Annual Wellness Visit    Jeremiah Nunez is here for Medicare AWV    Assessment & Plan   Medicare annual wellness visit, subsequent  Pain in left testicle  -     US SCROTUM AND TESTICLES; Future  Essential hypertension  Hyperlipidemia, unspecified hyperlipidemia type  Degeneration of intervertebral disc of cervical region  Chronic neck pain     Return in about 3 months (around 9/18/2025).    Ultrasound of scrotum as above.  Rule out any structural or blood flow abnormality.  Recent lab work is overall been reassuring.  Continue pain management at current dosage and frequency.  Recommend that patient try nicotine lozenges in order to quit smoking.  Recommend that he call his GI office to set up repeat colonoscopy.  Encouraged him to follow-up with his other specialists as previously planned.     Subjective   The following acute and/or chronic problems were also addressed today:  Tobacco abuse, left testicular pain, hypertension, hyperlipidemia    Patient's complete Health Risk Assessment and screening values have been reviewed and are found in Flowsheets. The following problems were reviewed today and where indicated follow up appointments were made and/or referrals ordered.    Positive Risk Factor Screenings with Interventions:    Fall Risk:  Do you feel unsteady or are you worried about falling? : (!) (Patient-Rptd) yes  2 or more falls in past year?: (!) (Patient-Rptd) yes  Fall with injury in past year?: (Patient-Rptd) no     Interventions:    Reviewed medications, home hazards, visual acuity, and co-morbidities that can increase risk for falls     Depression:  PHQ-2 Score: (Patient-Rptd) 3  PHQ-9 Total Score: (Patient-Rptd) 10  Total Score Interpretation: 10-14 = moderate depression  Interventions:  See AVS for additional education material  See A/P for any pertinent orders      Controlled Medication Review:      Today's Pain Level: No data recorded     Opioid Risk: (High risk score >=55) Opioid risk

## 2025-06-28 ENCOUNTER — HOSPITAL ENCOUNTER (OUTPATIENT)
Dept: CT IMAGING | Age: 65
Discharge: HOME | End: 2025-06-28
Attending: INTERNAL MEDICINE
Payer: MEDICARE

## 2025-06-28 ENCOUNTER — HOSPITAL ENCOUNTER (OUTPATIENT)
Dept: ULTRASOUND IMAGING | Age: 65
Discharge: HOME | End: 2025-06-28
Attending: STUDENT IN AN ORGANIZED HEALTH CARE EDUCATION/TRAINING PROGRAM
Payer: MEDICARE

## 2025-06-28 ENCOUNTER — HOSPITAL ENCOUNTER (OUTPATIENT)
Dept: MRI IMAGING | Age: 65
Discharge: HOME | End: 2025-06-28
Attending: STUDENT IN AN ORGANIZED HEALTH CARE EDUCATION/TRAINING PROGRAM
Payer: MEDICARE

## 2025-06-28 DIAGNOSIS — M50.30 DEGENERATION OF INTERVERTEBRAL DISC OF CERVICAL REGION: ICD-10-CM

## 2025-06-28 DIAGNOSIS — G89.29 CHRONIC NECK PAIN: ICD-10-CM

## 2025-06-28 DIAGNOSIS — N50.812 PAIN IN LEFT TESTICLE: ICD-10-CM

## 2025-06-28 DIAGNOSIS — T17.500A MUCUS PLUGGING OF BRONCHI: ICD-10-CM

## 2025-06-28 DIAGNOSIS — M54.2 CHRONIC NECK PAIN: ICD-10-CM

## 2025-06-28 PROCEDURE — 76870 US EXAM SCROTUM: CPT

## 2025-06-28 PROCEDURE — 71250 CT THORAX DX C-: CPT

## 2025-06-28 PROCEDURE — 72141 MRI NECK SPINE W/O DYE: CPT

## 2025-06-30 ENCOUNTER — RESULTS FOLLOW-UP (OUTPATIENT)
Dept: FAMILY MEDICINE CLINIC | Age: 65
End: 2025-06-30

## 2025-07-02 ENCOUNTER — RESULTS FOLLOW-UP (OUTPATIENT)
Dept: PULMONOLOGY | Age: 65
End: 2025-07-02

## 2025-07-02 RX ORDER — GUAIFENESIN 600 MG/1
600 TABLET, EXTENDED RELEASE ORAL 2 TIMES DAILY
Qty: 60 TABLET | Refills: 1 | Status: SHIPPED | OUTPATIENT
Start: 2025-07-02

## 2025-07-07 DIAGNOSIS — M50.30 DEGENERATION OF INTERVERTEBRAL DISC OF CERVICAL REGION: ICD-10-CM

## 2025-07-07 DIAGNOSIS — G89.29 CHRONIC NECK PAIN: ICD-10-CM

## 2025-07-07 DIAGNOSIS — M54.2 CHRONIC NECK PAIN: ICD-10-CM

## 2025-07-08 RX ORDER — OXYCODONE HYDROCHLORIDE 10 MG/1
10 TABLET ORAL EVERY 6 HOURS PRN
Qty: 120 TABLET | Refills: 0 | Status: SHIPPED | OUTPATIENT
Start: 2025-07-08 | End: 2025-08-07

## 2025-07-08 NOTE — TELEPHONE ENCOUNTER
Medication:   Requested Prescriptions     Pending Prescriptions Disp Refills    oxyCODONE HCl (OXY-IR) 10 MG immediate release tablet 120 tablet 0     Sig: Take 1 tablet by mouth every 6 hours as needed for Pain for up to 30 days. Intended supply: 30 days Max Daily Amount: 40 mg        Last Filled:      Patient Phone Number: [phone] (home)     Last appt: 6/18/2025   Next appt: 9/17/2025    Last [place]:       12/23/2024     1:10 PM   RX Monitoring   Periodic Controlled Substance Monitoring No signs of potential drug abuse or diversion identified.

## 2025-08-06 DIAGNOSIS — G89.29 CHRONIC NECK PAIN: ICD-10-CM

## 2025-08-06 DIAGNOSIS — M54.2 CHRONIC NECK PAIN: ICD-10-CM

## 2025-08-06 DIAGNOSIS — I10 ESSENTIAL HYPERTENSION: ICD-10-CM

## 2025-08-06 DIAGNOSIS — E78.5 HYPERLIPIDEMIA, UNSPECIFIED HYPERLIPIDEMIA TYPE: ICD-10-CM

## 2025-08-06 DIAGNOSIS — M50.30 DEGENERATION OF INTERVERTEBRAL DISC OF CERVICAL REGION: ICD-10-CM

## 2025-08-06 RX ORDER — ATORVASTATIN CALCIUM 10 MG/1
TABLET, FILM COATED ORAL
Qty: 90 TABLET | Refills: 1 | Status: SHIPPED | OUTPATIENT
Start: 2025-08-06

## 2025-08-06 RX ORDER — HYDROCHLOROTHIAZIDE 12.5 MG/1
12.5 TABLET ORAL DAILY
Qty: 90 TABLET | Refills: 1 | Status: SHIPPED | OUTPATIENT
Start: 2025-08-06

## 2025-08-06 RX ORDER — AMLODIPINE BESYLATE 5 MG/1
TABLET ORAL
Qty: 90 TABLET | Refills: 1 | Status: SHIPPED | OUTPATIENT
Start: 2025-08-06

## 2025-08-06 RX ORDER — OXYCODONE HYDROCHLORIDE 10 MG/1
10 TABLET ORAL EVERY 6 HOURS PRN
Qty: 120 TABLET | Refills: 0 | Status: SHIPPED | OUTPATIENT
Start: 2025-08-06 | End: 2025-09-05

## 2025-08-08 ENCOUNTER — OFFICE VISIT (OUTPATIENT)
Dept: FAMILY MEDICINE CLINIC | Age: 65
End: 2025-08-08

## 2025-08-08 VITALS
HEART RATE: 70 BPM | RESPIRATION RATE: 16 BRPM | WEIGHT: 215.4 LBS | HEIGHT: 70 IN | BODY MASS INDEX: 30.84 KG/M2 | DIASTOLIC BLOOD PRESSURE: 67 MMHG | TEMPERATURE: 97.8 F | OXYGEN SATURATION: 95 % | SYSTOLIC BLOOD PRESSURE: 136 MMHG

## 2025-08-08 DIAGNOSIS — I10 ESSENTIAL HYPERTENSION: Primary | ICD-10-CM

## 2025-08-08 DIAGNOSIS — G89.29 CHRONIC NECK PAIN: ICD-10-CM

## 2025-08-08 DIAGNOSIS — E78.5 HYPERLIPIDEMIA, UNSPECIFIED HYPERLIPIDEMIA TYPE: ICD-10-CM

## 2025-08-08 DIAGNOSIS — M54.2 CHRONIC NECK PAIN: ICD-10-CM

## 2025-08-08 DIAGNOSIS — M50.30 DEGENERATION OF INTERVERTEBRAL DISC OF CERVICAL REGION: ICD-10-CM

## 2025-08-08 SDOH — ECONOMIC STABILITY: FOOD INSECURITY: WITHIN THE PAST 12 MONTHS, THE FOOD YOU BOUGHT JUST DIDN'T LAST AND YOU DIDN'T HAVE MONEY TO GET MORE.: NEVER TRUE

## 2025-08-08 SDOH — ECONOMIC STABILITY: FOOD INSECURITY: WITHIN THE PAST 12 MONTHS, YOU WORRIED THAT YOUR FOOD WOULD RUN OUT BEFORE YOU GOT MONEY TO BUY MORE.: NEVER TRUE

## 2025-08-08 ASSESSMENT — PATIENT HEALTH QUESTIONNAIRE - PHQ9
SUM OF ALL RESPONSES TO PHQ QUESTIONS 1-9: 0
2. FEELING DOWN, DEPRESSED OR HOPELESS: NOT AT ALL
SUM OF ALL RESPONSES TO PHQ QUESTIONS 1-9: 0
1. LITTLE INTEREST OR PLEASURE IN DOING THINGS: NOT AT ALL
SUM OF ALL RESPONSES TO PHQ QUESTIONS 1-9: 0
SUM OF ALL RESPONSES TO PHQ QUESTIONS 1-9: 0

## 2025-09-02 DIAGNOSIS — M54.2 CHRONIC NECK PAIN: ICD-10-CM

## 2025-09-02 DIAGNOSIS — M50.30 DEGENERATION OF INTERVERTEBRAL DISC OF CERVICAL REGION: ICD-10-CM

## 2025-09-02 DIAGNOSIS — G89.29 CHRONIC NECK PAIN: ICD-10-CM

## 2025-09-02 RX ORDER — OXYCODONE HYDROCHLORIDE 10 MG/1
10 TABLET ORAL EVERY 6 HOURS PRN
Qty: 120 TABLET | Refills: 0 | Status: SHIPPED | OUTPATIENT
Start: 2025-09-02 | End: 2025-10-02